# Patient Record
Sex: FEMALE | Race: WHITE | NOT HISPANIC OR LATINO | Employment: OTHER | URBAN - NONMETROPOLITAN AREA
[De-identification: names, ages, dates, MRNs, and addresses within clinical notes are randomized per-mention and may not be internally consistent; named-entity substitution may affect disease eponyms.]

---

## 2017-01-04 DIAGNOSIS — I10 ESSENTIAL HYPERTENSION: ICD-10-CM

## 2017-01-04 DIAGNOSIS — J43.8 OTHER EMPHYSEMA (HCC): ICD-10-CM

## 2017-01-04 DIAGNOSIS — E03.8 OTHER SPECIFIED HYPOTHYROIDISM: ICD-10-CM

## 2017-01-26 ENCOUNTER — OFFICE VISIT (OUTPATIENT)
Dept: CARDIOLOGY | Facility: CLINIC | Age: 62
End: 2017-01-26

## 2017-01-26 VITALS
HEIGHT: 62 IN | SYSTOLIC BLOOD PRESSURE: 140 MMHG | OXYGEN SATURATION: 96 % | WEIGHT: 166.6 LBS | BODY MASS INDEX: 30.66 KG/M2 | HEART RATE: 70 BPM | DIASTOLIC BLOOD PRESSURE: 84 MMHG

## 2017-01-26 DIAGNOSIS — G89.4 CHRONIC PAIN SYNDROME: ICD-10-CM

## 2017-01-26 DIAGNOSIS — J44.1 CHRONIC OBSTRUCTIVE PULMONARY DISEASE WITH ACUTE EXACERBATION (HCC): ICD-10-CM

## 2017-01-26 DIAGNOSIS — E53.8 B12 DEFICIENCY: ICD-10-CM

## 2017-01-26 DIAGNOSIS — I10 ESSENTIAL HYPERTENSION: ICD-10-CM

## 2017-01-26 DIAGNOSIS — E03.8 OTHER SPECIFIED HYPOTHYROIDISM: ICD-10-CM

## 2017-01-26 DIAGNOSIS — E55.9 VITAMIN D DEFICIENCY: ICD-10-CM

## 2017-01-26 DIAGNOSIS — J43.8 OTHER EMPHYSEMA (HCC): ICD-10-CM

## 2017-01-26 DIAGNOSIS — E11.9 TYPE 2 DIABETES MELLITUS WITHOUT COMPLICATION, WITHOUT LONG-TERM CURRENT USE OF INSULIN (HCC): ICD-10-CM

## 2017-01-26 DIAGNOSIS — E78.2 MIXED HYPERLIPIDEMIA: Primary | ICD-10-CM

## 2017-01-26 DIAGNOSIS — J06.9 UPPER RESPIRATORY TRACT INFECTION, UNSPECIFIED TYPE: ICD-10-CM

## 2017-01-26 DIAGNOSIS — J20.9 ACUTE BRONCHITIS, UNSPECIFIED ORGANISM: ICD-10-CM

## 2017-01-26 PROCEDURE — 96372 THER/PROPH/DIAG INJ SC/IM: CPT | Performed by: INTERNAL MEDICINE

## 2017-01-26 PROCEDURE — 99214 OFFICE O/P EST MOD 30 MIN: CPT | Performed by: INTERNAL MEDICINE

## 2017-01-26 RX ORDER — HYDROCODONE BITARTRATE AND ACETAMINOPHEN 5; 325 MG/1; MG/1
1 TABLET ORAL EVERY 8 HOURS PRN
Qty: 90 TABLET | Refills: 0 | Status: SHIPPED | OUTPATIENT
Start: 2017-01-26 | End: 2017-04-13 | Stop reason: SDUPTHER

## 2017-01-26 RX ORDER — AMOXICILLIN 500 MG/1
500 CAPSULE ORAL 3 TIMES DAILY
Qty: 30 CAPSULE | Refills: 0 | Status: SHIPPED | OUTPATIENT
Start: 2017-01-26 | End: 2017-04-13

## 2017-01-26 RX ORDER — TRIAMCINOLONE ACETONIDE 40 MG/ML
40 INJECTION, SUSPENSION INTRA-ARTICULAR; INTRAMUSCULAR ONCE
Status: COMPLETED | OUTPATIENT
Start: 2017-01-26 | End: 2017-01-26

## 2017-01-26 RX ORDER — CEFTRIAXONE 500 MG/1
500 INJECTION, POWDER, FOR SOLUTION INTRAMUSCULAR; INTRAVENOUS ONCE
Status: COMPLETED | OUTPATIENT
Start: 2017-01-26 | End: 2017-01-26

## 2017-01-26 RX ADMIN — CEFTRIAXONE 500 MG: 500 INJECTION, POWDER, FOR SOLUTION INTRAMUSCULAR; INTRAVENOUS at 10:12

## 2017-01-26 RX ADMIN — TRIAMCINOLONE ACETONIDE 40 MG: 40 INJECTION, SUSPENSION INTRA-ARTICULAR; INTRAMUSCULAR at 10:14

## 2017-01-26 NOTE — PROGRESS NOTES
subjective     Chief Complaint   Patient presents with   • Cough   • URI     History of Present Illness  Upper respiratory tract infection.  Patient states that for last 4 days she has been having significant sneezing runny nose and sore throat and coughing with clear expectoration.  He is getting worse.  Patient denies any fever or chills.  She is taking Singulair for COPD and Combivent respimat.    HYPERTENSION  Leonarda Diaz has long-standing essential hypertension for years. she is taking medications regularly. There are no medication side effects. Blood pressure is very well controlled. There has been no headache nausea chest pain. There has been no syncopal or presyncopal episode. she denies episodes of hypo-tension or accelerated hypertension.    Diabetes Mellitus   Leonarda Diaz has type 2 diabetes mellitus. Taking oral hypoglycemic agents. Tolerating medications very well. she sugar is fluctuating. she is trying to diet and lose weight and exercise. There are no diabetic complications.    Hyperlipidemia  Leonarda Diaz has long-standing history of hyperlipidemia. Has been trying to lose weight following diet and exercise. Patient is not taking Lipitor.  Patient states that she cannot tolerate it she tried even the lowest possible dose and to quit even down to 1 or 2 pills a week but she gets severe aching all over and cannot tolerate the medication she has not taken any in at least a month.    Hypothyroidism  Leonarda Diaz has long-standing history of hypothyroidism.she is taking Synthroid. Doing very well. No drug side effects. No change in Weight. No cold intolerance. denies any constipation. No hair loss. No dry skin.     Patient is hurting quite a bit. She is taking Plaquenil 200 mg daily and the patient required to take hydrocodone 5/325 up to 3 a day.       Patient Active Problem List   Diagnosis   • Bronchitis, acute   • B12 deficiency*   • Chronic pain syndrome*   • Hypertension*   • Hyperlipidemia*   •  Diabetes mellitus*   • Hypothyroidism*   • Vitamin D deficiency*       Social History   Substance Use Topics   • Smoking status: Current Every Day Smoker     Years: 10.00     Types: Electronic Cigarette   • Smokeless tobacco: Never Used   • Alcohol use No       Allergies   Allergen Reactions   • Avelox [Moxifloxacin]          Current Outpatient Prescriptions:   •  atorvastatin (LIPITOR) 10 MG tablet, Take 1 tablet by mouth daily., Disp: 90 tablet, Rfl: 3  •  furosemide (LASIX) 20 MG tablet, Take 1 tablet by mouth 2 (two) times a day., Disp: 90 tablet, Rfl: 1  •  glucose blood test strip, 1 each by Other route Daily. Use as instructed, Disp: , Rfl:   •  HYDROcodone-acetaminophen (NORCO) 5-325 MG per tablet, Take 1 tablet by mouth Every 8 (Eight) Hours As Needed for moderate pain (4-6)., Disp: 90 tablet, Rfl: 0  •  hydroxychloroquine (PLAQUENIL) 200 MG tablet, Take 200 mg by mouth daily., Disp: , Rfl:   •  ipratropium-albuterol (COMBIVENT RESPIMAT)  MCG/ACT inhaler, Inhale 1 puff 4 (four) times a day as needed for wheezing., Disp: 1 g, Rfl: 1  •  levothyroxine (SYNTHROID, LEVOTHROID) 75 MCG tablet, Take 1 tablet by mouth daily., Disp: 90 tablet, Rfl: 1  •  linagliptin (TRADJENTA) 5 MG tablet tablet, Take 1 tablet by mouth Daily., Disp: 30 tablet, Rfl: 2  •  metFORMIN (GLUCOPHAGE) 1000 MG tablet, Take 1 tablet by mouth 2 (Two) Times a Day With Meals., Disp: 180 tablet, Rfl: 0  •  metoprolol tartrate (LOPRESSOR) 25 MG tablet, Take 1 tablet by mouth 2 (two) times a day., Disp: 180 tablet, Rfl: 1  •  montelukast (SINGULAIR) 10 MG tablet, Take 1 tablet by mouth Every Night., Disp: 30 tablet, Rfl: 2  •  ONE TOUCH LANCETS misc, USES TWICE A DAY TO CHECK BLOOD SUGAR, Disp: 180 each, Rfl: 1  •  vitamin B-12 (CYANOCOBALAMIN) 500 MCG tablet, Take 1,000 mcg by mouth daily., Disp: , Rfl:   •  amoxicillin (AMOXIL) 500 MG capsule, Take 1 capsule by mouth 3 (Three) Times a Day., Disp: 30 capsule, Rfl: 0    Current  "Facility-Administered Medications:   •  cefTRIAXone (ROCEPHIN) injection 500 mg, 500 mg, Intramuscular, Once, Evita Rosario MD  •  triamcinolone acetonide (KENALOG) injection 40 mg, 40 mg, Intramuscular, Once, Evita Rosario MD      The following portions of the patient's history were reviewed and updated as appropriate: allergies, current medications, past family history, past medical history, past social history, past surgical history and problem list.    Review of Systems   Constitution: Positive for weakness and malaise/fatigue.   HENT: Positive for congestion, hoarse voice and sore throat.    Eyes: Negative.    Cardiovascular: Negative.    Respiratory: Positive for cough, sputum production and wheezing. Negative for hemoptysis and shortness of breath.    Hematologic/Lymphatic: Negative.    Musculoskeletal: Positive for arthritis, back pain and joint pain.   Gastrointestinal: Negative.    Psychiatric/Behavioral: The patient is nervous/anxious.           Objective:     Visit Vitals   • /84 (BP Location: Left arm, Patient Position: Sitting)   • Pulse 70   • Ht 62\" (157.5 cm)   • Wt 166 lb 9.6 oz (75.6 kg)   • SpO2 96%   • BMI 30.47 kg/m2     Physical Exam   Constitutional: She appears well-developed and well-nourished.   HENT:   Head: Normocephalic and atraumatic.   Mouth/Throat: Oropharyngeal exudate present.   Postnasal drip noted   Eyes: Conjunctivae and EOM are normal. Pupils are equal, round, and reactive to light. No scleral icterus.   Neck: Normal range of motion. Neck supple. No JVD present. No tracheal deviation present. No thyromegaly present.   Cardiovascular: Normal rate, regular rhythm, normal heart sounds and intact distal pulses.  Exam reveals no friction rub.    No murmur heard.  Pulmonary/Chest: Effort normal. No respiratory distress. She has wheezes. She has no rales. She exhibits no tenderness.   Abdominal: Soft. Bowel sounds are normal. She exhibits no distension and no " mass. There is no tenderness. There is no rebound and no guarding.   Musculoskeletal: Normal range of motion. She exhibits no edema, tenderness or deformity.   Lymphadenopathy:     She has no cervical adenopathy.   Neurological: She is alert. She has normal reflexes. No cranial nerve deficit. She exhibits normal muscle tone. Coordination normal.   Skin: Skin is warm and dry.   Psychiatric: She has a normal mood and affect. Her behavior is normal. Judgment and thought content normal.         Lab Review  Lab Results   Component Value Date     10/18/2016    K 4.9 10/18/2016     10/18/2016    BUN 13 10/18/2016    CREATININE 0.82 10/18/2016    GLUCOSE 107 10/18/2016    CALCIUM 10.0 10/18/2016    ALT 12 10/18/2016    ALKPHOS 67 10/18/2016    LABIL2 1.3 (L) 10/18/2016     Lab Results   Component Value Date    CKTOTAL 36 10/18/2016     Lab Results   Component Value Date    WBC 8.47 10/18/2016    HGB 14.2 10/18/2016    HCT 43.6 10/18/2016     10/18/2016     No results found for: INR  No results found for: MG  Lab Results   Component Value Date    TSH 1.991 10/18/2016     No results found for: BNP  Lab Results   Component Value Date    CHLPL 184 04/28/2016     Lab Results   Component Value Date    CHOL 221 (H) 10/18/2016    TRIG 169 (H) 10/18/2016    HDL 54 (L) 10/18/2016    LDLCALC 133 (H) 10/18/2016    VLDL 33.8 10/18/2016    LDLHDL 2.47 10/18/2016         Procedures     I personally viewed and interpreted the patient's LAB data         Assessment:     1. Mixed hyperlipidemia    2. Essential hypertension    3. Other emphysema    4. Other specified hypothyroidism    5. Vitamin D deficiency*    6. B12 deficiency*    7. Type 2 diabetes mellitus without complication, without long-term current use of insulin    8. Chronic pain syndrome*    9. Upper respiratory tract infection, unspecified type    10. Chronic obstructive pulmonary disease with acute exacerbation    11. Acute bronchitis, unspecified organism           Plan:      Acute bronchitis  Patient was given Rocephin amoxicillin she will continue to take Singulair  COPD with acute exacerbation she was given Kenalog shot she will take Singulair and respimat  Hyperlipidemia has been traditionally very poorly controlled patient is having difficulty taking Crestor but she stated that she is taking regularly now and hopefully her lab work will be better next time.  Pain medication refills were also given.  Her pressure is controlled  Will check thyroid functions also  .        No Follow-up on file.

## 2017-01-26 NOTE — MR AVS SNAPSHOT
Leonarda Diaz   1/26/2017 10:00 AM   Office Visit    Dept Phone:  [phone]   Encounter #:  [id number]    Provider:  Evita Rosario MD   Department:  [facility] CARDIOLOGY                Your Full Care Plan              Today's Medication Changes          These changes are accurate as of: 1/26/17 10:14 AM.  If you have any questions, ask your nurse or doctor.               New Medication(s)Ordered:     amoxicillin 500 MG capsule   Commonly known as:  AMOXIL   Take 1 capsule by mouth 3 (Three) Times a Day.   Started by:  Evita Rosario MD         Stop taking medication(s)listed here:     cholestyramine 4 G packet   Commonly known as:  QUESTRAN   Stopped by:  Evita Rosario MD                Where to Get Your Medications      These medications were sent to [address] [phone] [facility] [phone] [address] [address]     Phone:  [phone]     amoxicillin 500 MG capsule         You can get these medications from any pharmacy     Bring a paper prescription for each of these medications     HYDROcodone-acetaminophen 5-325 MG per tablet                  Your Updated Medication List          This list is accurate as of: 1/26/17 10:14 AM.  Always use your most recent med list.                amoxicillin 500 MG capsule   Commonly known as:  AMOXIL   Take 1 capsule by mouth 3 (Three) Times a Day.       atorvastatin 10 MG tablet   Commonly known as:  LIPITOR   Take 1 tablet by mouth daily.       furosemide 20 MG tablet   Commonly known as:  LASIX   Take 1 tablet by mouth 2 (two) times a day.       glucose blood test strip       HYDROcodone-acetaminophen 5-325 MG per tablet   Commonly known as:  NORCO   Take 1 tablet by mouth Every 8 (Eight) Hours As Needed for moderate pain (4-6).       ipratropium-albuterol  MCG/ACT inhaler   Commonly known as:  COMBIVENT RESPIMAT   Inhale 1 puff 4  (four) times a day as needed for wheezing.       levothyroxine 75 MCG tablet   Commonly known as:  SYNTHROID, LEVOTHROID   Take 1 tablet by mouth daily.       linagliptin 5 MG tablet tablet   Commonly known as:  TRADJENTA   Take 1 tablet by mouth Daily.       metFORMIN 1000 MG tablet   Commonly known as:  GLUCOPHAGE   Take 1 tablet by mouth 2 (Two) Times a Day With Meals.       metoprolol tartrate 25 MG tablet   Commonly known as:  LOPRESSOR   Take 1 tablet by mouth 2 (two) times a day.       montelukast 10 MG tablet   Commonly known as:  SINGULAIR   Take 1 tablet by mouth Every Night.       ONE TOUCH LANCETS misc   USES TWICE A DAY TO CHECK BLOOD SUGAR       PLAQUENIL 200 MG tablet   Generic drug:  hydroxychloroquine       vitamin B-12 500 MCG tablet   Commonly known as:  CYANOCOBALAMIN               You Were Diagnosed With        Codes Comments    Mixed hyperlipidemia    -  Primary ICD-10-CM: E78.2  ICD-9-CM: 272.2     Essential hypertension     ICD-10-CM: I10  ICD-9-CM: 401.9     Other emphysema     ICD-10-CM: J43.8  ICD-9-CM: 492.8     Other specified hypothyroidism     ICD-10-CM: E03.8  ICD-9-CM: 244.8     Vitamin D deficiency     ICD-10-CM: E55.9  ICD-9-CM: 268.9     B12 deficiency     ICD-10-CM: E53.8  ICD-9-CM: 266.2     Type 2 diabetes mellitus without complication, without long-term current use of insulin     ICD-10-CM: E11.9  ICD-9-CM: 250.00     Chronic pain syndrome     ICD-10-CM: G89.4  ICD-9-CM: 338.4     Upper respiratory tract infection, unspecified type     ICD-10-CM: J06.9  ICD-9-CM: 465.9     Chronic obstructive pulmonary disease with acute exacerbation     ICD-10-CM: J44.1  ICD-9-CM: 491.21     Acute bronchitis, unspecified organism     ICD-10-CM: J20.9  ICD-9-CM: 466.0       Medications to be Given to You by a Medical Professional     Due       Frequency    1/26/2017 triamcinolone acetonide (KENALOG-40) injection 40 mg  Once    (none) cefTRIAXone (ROCEPHIN) injection 500 mg  Once     "  Instructions     None    Patient Instructions History      Upcoming Appointments     Visit Type Date Time Department    FOLLOW UP 1/26/2017 10:00 AM [facility] [place] [facility]OLOG[facility] [place]    LAB 4/12/2017  8:50 AM [facility] [place] [facility]IOLOGY [place]    FOLLOW UP 4/13/2017 10:30 AM [facility] [place] [facility]IOLOGY [place]      MyChart Signup     Our records indicate that you have declined [facility] sign[facility]. If you would like to sign up for [facility], please email [email or web address] or call [phone] to obtain an activation code.             Other Info from Your Visit           Your Appointments     Apr 12, 2017  8:50 AM EDT   Lab with [facility]WORK, CARD COR   [facility] CARDIOLOGY (--)    [address] [place] [address]   [phone]            Apr 13, 2017 10:30 AM EDT   Follow Up with Evita Rosario MD   [facility] CARDIOLOGY (--)    [address] [place] [address]   [phone]           Arrive 15 minutes prior to appointment.              Allergies     Avelox [Moxifloxacin]        Reason for Visit     Cough     URI           Vital Signs     Blood Pressure Pulse Height Weight Oxygen Saturation Body Mass Index    140/84 (BP Location: Left arm, Patient Position: Sitting) 70 62\" (157.5 cm) 166 lb 9.6 oz (75.6 kg) 96% 30.47 kg/m2    Smoking Status                   Current Every Day Smoker           Problems and Diagnoses Noted     Vitamin B12 deficiency    Acute bronchitis    Chronic pain syndrome    Diabetes    High cholesterol or triglycerides    High blood pressure    Underactive thyroid    Vitamin D deficiency    Other emphysema        Upper respiratory infection        Chronic obstructive pulmonary disease with acute exacerbation          Medications Administered     cefTRIAXone (ROCEPHIN) injection 500 mg                      "

## 2017-02-01 DIAGNOSIS — J43.8 OTHER EMPHYSEMA (HCC): ICD-10-CM

## 2017-02-01 DIAGNOSIS — I10 ESSENTIAL HYPERTENSION: ICD-10-CM

## 2017-02-01 DIAGNOSIS — E03.8 OTHER SPECIFIED HYPOTHYROIDISM: ICD-10-CM

## 2017-02-01 RX ORDER — LEVOTHYROXINE SODIUM 0.07 MG/1
75 TABLET ORAL DAILY
Qty: 90 TABLET | Refills: 1 | Status: SHIPPED | OUTPATIENT
Start: 2017-02-01 | End: 2017-07-06 | Stop reason: SDUPTHER

## 2017-04-06 DIAGNOSIS — J43.8 OTHER EMPHYSEMA (HCC): ICD-10-CM

## 2017-04-06 DIAGNOSIS — I10 ESSENTIAL HYPERTENSION: ICD-10-CM

## 2017-04-06 DIAGNOSIS — E03.8 OTHER SPECIFIED HYPOTHYROIDISM: ICD-10-CM

## 2017-04-12 ENCOUNTER — LAB (OUTPATIENT)
Dept: CARDIOLOGY | Facility: CLINIC | Age: 62
End: 2017-04-12

## 2017-04-12 DIAGNOSIS — E03.8 OTHER SPECIFIED HYPOTHYROIDISM: ICD-10-CM

## 2017-04-12 DIAGNOSIS — E53.8 B12 DEFICIENCY: ICD-10-CM

## 2017-04-12 DIAGNOSIS — E78.2 MIXED HYPERLIPIDEMIA: ICD-10-CM

## 2017-04-12 DIAGNOSIS — E55.9 VITAMIN D DEFICIENCY: ICD-10-CM

## 2017-04-12 LAB
25(OH)D3 SERPL-MCNC: 34 NG/ML
ALBUMIN SERPL-MCNC: 4 G/DL (ref 3.4–4.8)
ALBUMIN/GLOB SERPL: 1.4 G/DL (ref 1.5–2.5)
ALP SERPL-CCNC: 71 U/L (ref 35–104)
ALT SERPL W P-5'-P-CCNC: 11 U/L (ref 10–36)
ANION GAP SERPL CALCULATED.3IONS-SCNC: 9.3 MMOL/L (ref 3.6–11.2)
AST SERPL-CCNC: 13 U/L (ref 10–30)
BASOPHILS # BLD AUTO: 0.05 10*3/MM3 (ref 0–0.3)
BASOPHILS NFR BLD AUTO: 0.5 % (ref 0–2)
BILIRUB SERPL-MCNC: 0.5 MG/DL (ref 0.2–1.8)
BUN BLD-MCNC: 9 MG/DL (ref 7–21)
BUN/CREAT SERPL: 13 (ref 7–25)
CALCIUM SPEC-SCNC: 9.1 MG/DL (ref 7.7–10)
CHLORIDE SERPL-SCNC: 108 MMOL/L (ref 99–112)
CHOLEST SERPL-MCNC: 146 MG/DL (ref 0–200)
CK SERPL-CCNC: 40 U/L (ref 24–173)
CO2 SERPL-SCNC: 24.7 MMOL/L (ref 24.3–31.9)
CREAT BLD-MCNC: 0.69 MG/DL (ref 0.43–1.29)
DEPRECATED RDW RBC AUTO: 45.7 FL (ref 37–54)
EOSINOPHIL # BLD AUTO: 0.33 10*3/MM3 (ref 0–0.7)
EOSINOPHIL NFR BLD AUTO: 3 % (ref 0–5)
ERYTHROCYTE [DISTWIDTH] IN BLOOD BY AUTOMATED COUNT: 13.7 % (ref 11.5–14.5)
GFR SERPL CREATININE-BSD FRML MDRD: 86 ML/MIN/1.73
GLOBULIN UR ELPH-MCNC: 2.9 GM/DL
GLUCOSE BLD-MCNC: 115 MG/DL (ref 70–110)
HCT VFR BLD AUTO: 41.5 % (ref 37–47)
HDLC SERPL-MCNC: 47 MG/DL (ref 60–100)
HGB BLD-MCNC: 13.3 G/DL (ref 12–16)
IMM GRANULOCYTES # BLD: 0.09 10*3/MM3 (ref 0–0.03)
IMM GRANULOCYTES NFR BLD: 0.8 % (ref 0–0.5)
LDLC SERPL CALC-MCNC: 76 MG/DL (ref 0–100)
LDLC/HDLC SERPL: 1.62 {RATIO}
LYMPHOCYTES # BLD AUTO: 1.76 10*3/MM3 (ref 1–3)
LYMPHOCYTES NFR BLD AUTO: 16.1 % (ref 21–51)
MCH RBC QN AUTO: 30.6 PG (ref 27–33)
MCHC RBC AUTO-ENTMCNC: 32 G/DL (ref 33–37)
MCV RBC AUTO: 95.4 FL (ref 80–94)
MONOCYTES # BLD AUTO: 0.78 10*3/MM3 (ref 0.1–0.9)
MONOCYTES NFR BLD AUTO: 7.1 % (ref 0–10)
NEUTROPHILS # BLD AUTO: 7.93 10*3/MM3 (ref 1.4–6.5)
NEUTROPHILS NFR BLD AUTO: 72.5 % (ref 30–70)
OSMOLALITY SERPL CALC.SUM OF ELEC: 282.7 MOSM/KG (ref 273–305)
PLATELET # BLD AUTO: 286 10*3/MM3 (ref 130–400)
PMV BLD AUTO: 11.3 FL (ref 6–10)
POTASSIUM BLD-SCNC: 4.3 MMOL/L (ref 3.5–5.3)
PROT SERPL-MCNC: 6.9 G/DL (ref 6–8)
RBC # BLD AUTO: 4.35 10*6/MM3 (ref 4.2–5.4)
SODIUM BLD-SCNC: 142 MMOL/L (ref 135–153)
T4 FREE SERPL-MCNC: 1.46 NG/DL (ref 0.89–1.76)
TRIGL SERPL-MCNC: 114 MG/DL (ref 0–150)
TSH SERPL DL<=0.05 MIU/L-ACNC: 2.88 MIU/ML (ref 0.55–4.78)
VIT B12 BLD-MCNC: 191 PG/ML (ref 211–911)
VLDLC SERPL-MCNC: 22.8 MG/DL
WBC NRBC COR # BLD: 10.94 10*3/MM3 (ref 4.5–12.5)

## 2017-04-12 PROCEDURE — 80061 LIPID PANEL: CPT | Performed by: INTERNAL MEDICINE

## 2017-04-12 PROCEDURE — 84443 ASSAY THYROID STIM HORMONE: CPT | Performed by: INTERNAL MEDICINE

## 2017-04-12 PROCEDURE — 84439 ASSAY OF FREE THYROXINE: CPT | Performed by: INTERNAL MEDICINE

## 2017-04-12 PROCEDURE — 85025 COMPLETE CBC W/AUTO DIFF WBC: CPT | Performed by: INTERNAL MEDICINE

## 2017-04-12 PROCEDURE — 82550 ASSAY OF CK (CPK): CPT | Performed by: INTERNAL MEDICINE

## 2017-04-12 PROCEDURE — 82306 VITAMIN D 25 HYDROXY: CPT | Performed by: INTERNAL MEDICINE

## 2017-04-12 PROCEDURE — 82607 VITAMIN B-12: CPT | Performed by: INTERNAL MEDICINE

## 2017-04-12 PROCEDURE — 80053 COMPREHEN METABOLIC PANEL: CPT | Performed by: INTERNAL MEDICINE

## 2017-04-13 ENCOUNTER — OFFICE VISIT (OUTPATIENT)
Dept: CARDIOLOGY | Facility: CLINIC | Age: 62
End: 2017-04-13

## 2017-04-13 VITALS
SYSTOLIC BLOOD PRESSURE: 122 MMHG | BODY MASS INDEX: 30.22 KG/M2 | WEIGHT: 164.2 LBS | DIASTOLIC BLOOD PRESSURE: 62 MMHG | OXYGEN SATURATION: 97 % | HEIGHT: 62 IN | HEART RATE: 68 BPM

## 2017-04-13 DIAGNOSIS — E03.8 OTHER SPECIFIED HYPOTHYROIDISM: ICD-10-CM

## 2017-04-13 DIAGNOSIS — E55.9 VITAMIN D DEFICIENCY: ICD-10-CM

## 2017-04-13 DIAGNOSIS — E11.10 UNCONTROLLED TYPE 2 DIABETES MELLITUS WITH KETOACIDOSIS WITHOUT COMA, WITH LONG-TERM CURRENT USE OF INSULIN (HCC): ICD-10-CM

## 2017-04-13 DIAGNOSIS — J43.8 OTHER EMPHYSEMA (HCC): ICD-10-CM

## 2017-04-13 DIAGNOSIS — E11.10 UNCONTROLLED TYPE 2 DIABETES MELLITUS WITH KETOACIDOSIS WITHOUT COMA, WITHOUT LONG-TERM CURRENT USE OF INSULIN (HCC): ICD-10-CM

## 2017-04-13 DIAGNOSIS — G89.4 CHRONIC PAIN SYNDROME: ICD-10-CM

## 2017-04-13 DIAGNOSIS — E11.9 TYPE 2 DIABETES MELLITUS WITHOUT COMPLICATION, WITHOUT LONG-TERM CURRENT USE OF INSULIN (HCC): ICD-10-CM

## 2017-04-13 DIAGNOSIS — E53.8 B12 DEFICIENCY: ICD-10-CM

## 2017-04-13 DIAGNOSIS — I10 ESSENTIAL HYPERTENSION: ICD-10-CM

## 2017-04-13 DIAGNOSIS — E78.2 MIXED HYPERLIPIDEMIA: Primary | ICD-10-CM

## 2017-04-13 DIAGNOSIS — Z79.4 UNCONTROLLED TYPE 2 DIABETES MELLITUS WITH KETOACIDOSIS WITHOUT COMA, WITH LONG-TERM CURRENT USE OF INSULIN (HCC): ICD-10-CM

## 2017-04-13 PROCEDURE — 99214 OFFICE O/P EST MOD 30 MIN: CPT | Performed by: INTERNAL MEDICINE

## 2017-04-13 RX ORDER — MONTELUKAST SODIUM 10 MG/1
10 TABLET ORAL NIGHTLY
Qty: 30 TABLET | Refills: 2 | Status: SHIPPED | OUTPATIENT
Start: 2017-04-13 | End: 2017-07-06 | Stop reason: SDUPTHER

## 2017-04-13 RX ORDER — HYDROCODONE BITARTRATE AND ACETAMINOPHEN 5; 325 MG/1; MG/1
1 TABLET ORAL EVERY 8 HOURS PRN
Qty: 90 TABLET | Refills: 0 | Status: SHIPPED | OUTPATIENT
Start: 2017-04-13 | End: 2017-07-06 | Stop reason: SDUPTHER

## 2017-04-13 NOTE — PROGRESS NOTES
subjective     Chief Complaint   Patient presents with   • Hypertension   • Hyperlipidemia   • Vitamin D Deficiency   • Diabetes     History of Present Illness  HYPERTENSION  Leonarda Diaz has long-standing essential hypertension for years. she is taking medications regularly. There are no medication side effects. Blood pressure is very well controlled. There has been no headache nausea chest pain. There has been no syncopal or presyncopal episode. she denies episodes of hypo-tension or accelerated hypertension.     Diabetes Mellitus   Leonarda Diaz has type 2 diabetes mellitus. Taking oral hypoglycemic agents. Tolerating medications very well. she sugar is fluctuating. she is trying to diet and lose weight and exercise. There are no diabetic complications.     Hyperlipidemia  Leonarda Diaz has long-standing history of hyperlipidemia. Has been trying to lose weight following diet and exercise. Patient is not taking Lipitor.  Patient states that she cannot tolerate it she tried even the lowest possible dose and to quit even down to 1 or 2 pills a week but she gets severe aching all over and cannot tolerate the medication she has not taken any in at least a month.     Hypothyroidism  Leonarda Diaz has long-standing history of hypothyroidism.she is taking Synthroid. Doing very well. No drug side effects. No change in Weight. No cold intolerance. denies any constipation. No hair loss. No dry skin.      Patient is hurting quite a bit. She is taking Plaquenil 200 mg daily and the patient required to take hydrocodone 5/325 up to 3 a day.           Patient Active Problem List   Diagnosis   • B12 deficiency*   • Chronic pain syndrome*   • Hypertension*   • Hyperlipidemia*   • Diabetes mellitus*   • Hypothyroidism*   • Vitamin D deficiency*       Social History   Substance Use Topics   • Smoking status: Current Every Day Smoker     Years: 10.00     Types: Electronic Cigarette   • Smokeless tobacco: Never Used   • Alcohol use No        Allergies   Allergen Reactions   • Avelox [Moxifloxacin]          Current Outpatient Prescriptions:   •  atorvastatin (LIPITOR) 10 MG tablet, Take 1 tablet by mouth daily., Disp: 90 tablet, Rfl: 3  •  furosemide (LASIX) 20 MG tablet, Take 1 tablet by mouth 2 (two) times a day., Disp: 90 tablet, Rfl: 1  •  glucose blood test strip, 1 each by Other route Daily. Use as instructed, Disp: , Rfl:   •  HYDROcodone-acetaminophen (NORCO) 5-325 MG per tablet, Take 1 tablet by mouth Every 8 (Eight) Hours As Needed for moderate pain (4-6)., Disp: 90 tablet, Rfl: 0  •  hydroxychloroquine (PLAQUENIL) 200 MG tablet, Take 200 mg by mouth daily., Disp: , Rfl:   •  ipratropium-albuterol (COMBIVENT RESPIMAT)  MCG/ACT inhaler, Inhale 1 puff 4 (four) times a day as needed for wheezing., Disp: 1 g, Rfl: 1  •  levothyroxine (SYNTHROID, LEVOTHROID) 75 MCG tablet, Take 1 tablet by mouth Daily., Disp: 90 tablet, Rfl: 1  •  linagliptin (TRADJENTA) 5 MG tablet tablet, Take 1 tablet by mouth Daily., Disp: 30 tablet, Rfl: 2  •  metFORMIN (GLUCOPHAGE) 1000 MG tablet, Take 1 tablet by mouth 2 (Two) Times a Day With Meals., Disp: 180 tablet, Rfl: 0  •  metoprolol tartrate (LOPRESSOR) 25 MG tablet, Take 1 tablet by mouth 2 (Two) Times a Day., Disp: 180 tablet, Rfl: 1  •  montelukast (SINGULAIR) 10 MG tablet, Take 1 tablet by mouth Every Night., Disp: 30 tablet, Rfl: 2  •  ONE TOUCH LANCETS misc, USES TWICE A DAY TO CHECK BLOOD SUGAR, Disp: 180 each, Rfl: 1  •  vitamin B-12 (CYANOCOBALAMIN) 500 MCG tablet, Take 1,000 mcg by mouth daily., Disp: , Rfl:       The following portions of the patient's history were reviewed and updated as appropriate: allergies, current medications, past family history, past medical history, past social history, past surgical history and problem list.    Review of Systems   Constitution: Negative.   HENT: Negative.    Eyes: Negative.    Cardiovascular: Positive for leg swelling.   Respiratory: Positive for  "shortness of breath.    Hematologic/Lymphatic: Negative.    Musculoskeletal: Positive for arthritis, back pain, myalgias and stiffness.   Gastrointestinal: Negative.    Neurological: Negative.           Objective:     /62 (BP Location: Left arm, Patient Position: Sitting)  Pulse 68  Ht 62\" (157.5 cm)  Wt 164 lb 3.2 oz (74.5 kg)  SpO2 97%  BMI 30.03 kg/m2  Physical Exam   Constitutional: She appears well-developed and well-nourished.   HENT:   Head: Normocephalic and atraumatic.   Mouth/Throat: Oropharynx is clear and moist.   Eyes: Conjunctivae and EOM are normal. Pupils are equal, round, and reactive to light. No scleral icterus.   Neck: Normal range of motion. Neck supple. No JVD present. No tracheal deviation present. No thyromegaly present.   Cardiovascular: Normal rate, regular rhythm, normal heart sounds and intact distal pulses.  Exam reveals no friction rub.    No murmur heard.  Pulmonary/Chest: Effort normal and breath sounds normal. No respiratory distress. She has no wheezes. She has no rales. She exhibits no tenderness.   Abdominal: Soft. Bowel sounds are normal. She exhibits no distension and no mass. There is no tenderness. There is no rebound and no guarding.   Musculoskeletal: Normal range of motion. She exhibits no edema, tenderness or deformity.   Lymphadenopathy:     She has no cervical adenopathy.   Neurological: She is alert. She has normal reflexes. No cranial nerve deficit. She exhibits normal muscle tone. Coordination normal.   Skin: Skin is warm and dry.   Psychiatric: She has a normal mood and affect. Her behavior is normal. Judgment and thought content normal.         Lab Review  Lab Results   Component Value Date     04/12/2017    K 4.3 04/12/2017     04/12/2017    BUN 9 04/12/2017    CREATININE 0.69 04/12/2017    GLUCOSE 115 (H) 04/12/2017    CALCIUM 9.1 04/12/2017    ALT 11 04/12/2017    ALKPHOS 71 04/12/2017    LABIL2 1.4 (L) 04/12/2017     Lab Results "   Component Value Date    CKTOTAL 40 04/12/2017     Lab Results   Component Value Date    WBC 10.94 04/12/2017    HGB 13.3 04/12/2017    HCT 41.5 04/12/2017     04/12/2017     No results found for: INR  No results found for: MG  Lab Results   Component Value Date    TSH 2.876 04/12/2017     No results found for: BNP  Lab Results   Component Value Date    CHLPL 184 04/28/2016     Lab Results   Component Value Date    CHOL 146 04/12/2017    TRIG 114 04/12/2017    HDL 47 (L) 04/12/2017    LDLCALC 76 04/12/2017    VLDL 22.8 04/12/2017    LDLHDL 1.62 04/12/2017         Procedures     I personally viewed and interpreted the patient's LAB data         Assessment:     1. Mixed hyperlipidemia    2. Essential hypertension    3. Other emphysema    4. Other specified hypothyroidism    5. Uncontrolled type 2 diabetes mellitus with ketoacidosis without coma, with long-term current use of insulin    6. Uncontrolled type 2 diabetes mellitus with ketoacidosis without coma, without long-term current use of insulin    7. B12 deficiency*    8. Vitamin D deficiency*    9. Type 2 diabetes mellitus without complication, without long-term current use of insulin    10. Chronic pain syndrome*          Plan:      Lipid control is very well with total cholesterol of 146 and LDL of 76.  Further risk modification was urged.  Patient will continue Lipitor 10 mg daily.    Diabetes control is also good she is taking Glucophage 1000 twice a day Tradjenta 5 mg daily.  She is taking her sugar daily and is fairly well controlled.    Blood pressure is borderline elevated no change in therapy was made    Thyroid functions and normal Synthroid dose was continued.    Weight loss and aggressive risk factor modification was again stressed.  She will continue vitamin D and vitamin B.    chronic pain Norco was prescribed also.  Follow-up scheduled        No Follow-up on file.

## 2017-07-06 ENCOUNTER — OFFICE VISIT (OUTPATIENT)
Dept: CARDIOLOGY | Facility: CLINIC | Age: 62
End: 2017-07-06

## 2017-07-06 VITALS
BODY MASS INDEX: 29.63 KG/M2 | OXYGEN SATURATION: 94 % | HEIGHT: 62 IN | HEART RATE: 73 BPM | WEIGHT: 161 LBS | SYSTOLIC BLOOD PRESSURE: 130 MMHG | DIASTOLIC BLOOD PRESSURE: 80 MMHG

## 2017-07-06 DIAGNOSIS — E78.2 MIXED HYPERLIPIDEMIA: Primary | ICD-10-CM

## 2017-07-06 DIAGNOSIS — E11.9 TYPE 2 DIABETES MELLITUS WITHOUT COMPLICATION, WITHOUT LONG-TERM CURRENT USE OF INSULIN (HCC): ICD-10-CM

## 2017-07-06 DIAGNOSIS — G89.4 CHRONIC PAIN SYNDROME: ICD-10-CM

## 2017-07-06 DIAGNOSIS — E55.9 VITAMIN D DEFICIENCY: ICD-10-CM

## 2017-07-06 DIAGNOSIS — E53.8 B12 DEFICIENCY: ICD-10-CM

## 2017-07-06 DIAGNOSIS — Z13.9 SCREENING: ICD-10-CM

## 2017-07-06 DIAGNOSIS — I10 ESSENTIAL HYPERTENSION: ICD-10-CM

## 2017-07-06 DIAGNOSIS — E03.8 OTHER SPECIFIED HYPOTHYROIDISM: ICD-10-CM

## 2017-07-06 DIAGNOSIS — J43.8 OTHER EMPHYSEMA (HCC): ICD-10-CM

## 2017-07-06 PROCEDURE — 99214 OFFICE O/P EST MOD 30 MIN: CPT | Performed by: INTERNAL MEDICINE

## 2017-07-06 RX ORDER — HYDROCODONE BITARTRATE AND ACETAMINOPHEN 5; 325 MG/1; MG/1
1 TABLET ORAL EVERY 8 HOURS PRN
Qty: 90 TABLET | Refills: 0 | Status: SHIPPED | OUTPATIENT
Start: 2017-07-06 | End: 2017-10-02 | Stop reason: SDUPTHER

## 2017-07-06 RX ORDER — ATORVASTATIN CALCIUM 10 MG/1
10 TABLET, FILM COATED ORAL DAILY
Qty: 90 TABLET | Refills: 3 | Status: SHIPPED | OUTPATIENT
Start: 2017-07-06 | End: 2018-03-20 | Stop reason: SDUPTHER

## 2017-07-06 RX ORDER — LEVOTHYROXINE SODIUM 0.07 MG/1
75 TABLET ORAL DAILY
Qty: 90 TABLET | Refills: 1 | Status: SHIPPED | OUTPATIENT
Start: 2017-07-06 | End: 2017-10-02

## 2017-07-06 RX ORDER — MONTELUKAST SODIUM 10 MG/1
10 TABLET ORAL NIGHTLY
Qty: 30 TABLET | Refills: 2 | Status: SHIPPED | OUTPATIENT
Start: 2017-07-06 | End: 2017-10-02 | Stop reason: SDUPTHER

## 2017-07-06 NOTE — PROGRESS NOTES
subjective     Chief Complaint   Patient presents with   • Hypertension   • Hyperlipidemia   • Diabetes     History of Present Illness    HYPERTENSION  Leonarda Diaz has long-standing essential hypertension for years. she is taking medications regularly. There are no medication side effects. Blood pressure is very well controlled. There has been no headache nausea chest pain. There has been no syncopal or presyncopal episode. she denies episodes of hypo-tension or accelerated hypertension.      Diabetes Mellitus   Leonarda Diaz has type 2 diabetes mellitus. Taking oral hypoglycemic agents. Tolerating medications very well. she sugar is fluctuating. she is trying to diet and lose weight and exercise. There are no diabetic complications.      Hyperlipidemia  Leonarda Diaz has long-standing history of hyperlipidemia. Has been trying to lose weight following diet and exercise. Patient is not taking Lipitor.  Patient states that she cannot tolerate it she tried even the lowest possible dose and to quit even down to 1 or 2 pills a week but she gets severe aching all over and cannot tolerate the medication she has not taken any in at least a month.      Hypothyroidism  Leonarda Diaz has long-standing history of hypothyroidism.she is taking Synthroid. Doing very well. No drug side effects. No change in Weight. No cold intolerance. denies any constipation. No hair loss. No dry skin.      Patient is hurting quite a bit. She is taking Plaquenil 200 mg daily and the patient required to take hydrocodone 5/325 up to 3 a day.          Past Surgical History:   Procedure Laterality Date   • CHOLECYSTECTOMY     • COLONOSCOPY W/ BIOPSIES  2012   • HYSTERECTOMY     • VARICOSE VEIN SURGERY Right      Family History   Problem Relation Age of Onset   • Thyroid cancer Mother    • Aneurysm Mother    • Hypertension Mother    • Diabetes Mother    • Arthritis Mother    • Heart attack Father    • Heart disease Father    • Diabetes Brother    • Kidney  failure Brother    • Hypertension Brother    • Diabetes Brother    • Hypertension Brother      Past Medical History:   Diagnosis Date   • B12 deficiency    • Bronchitis, acute    • Chronic pain syndrome    • COPD (chronic obstructive pulmonary disease)    • Diabetes mellitus    • Disease of thyroid gland    • Hyperlipidemia    • Hypertension      Patient Active Problem List   Diagnosis   • B12 deficiency*   • Chronic pain syndrome*   • Hypertension*   • Hyperlipidemia*   • Diabetes mellitus*   • Hypothyroidism*   • Vitamin D deficiency*       Social History   Substance Use Topics   • Smoking status: Current Every Day Smoker     Years: 10.00     Types: Electronic Cigarette   • Smokeless tobacco: Never Used   • Alcohol use No       Allergies   Allergen Reactions   • Avelox [Moxifloxacin]        Current Outpatient Prescriptions on File Prior to Visit   Medication Sig   • furosemide (LASIX) 20 MG tablet Take 1 tablet by mouth 2 (two) times a day.   • hydroxychloroquine (PLAQUENIL) 200 MG tablet Take 200 mg by mouth daily.   • vitamin B-12 (CYANOCOBALAMIN) 500 MCG tablet Take 1,000 mcg by mouth daily.   • [DISCONTINUED] atorvastatin (LIPITOR) 10 MG tablet Take 1 tablet by mouth daily.   • [DISCONTINUED] glucose blood test strip 1 each by Other route Daily. Use as instructed   • [DISCONTINUED] HYDROcodone-acetaminophen (NORCO) 5-325 MG per tablet Take 1 tablet by mouth Every 8 (Eight) Hours As Needed for Moderate Pain (4-6).   • [DISCONTINUED] ipratropium-albuterol (COMBIVENT RESPIMAT)  MCG/ACT inhaler Inhale 1 puff 4 (Four) Times a Day As Needed for Wheezing.   • [DISCONTINUED] levothyroxine (SYNTHROID, LEVOTHROID) 75 MCG tablet Take 1 tablet by mouth Daily.   • [DISCONTINUED] linagliptin (TRADJENTA) 5 MG tablet tablet Take 1 tablet by mouth Daily.   • [DISCONTINUED] metFORMIN (GLUCOPHAGE) 1000 MG tablet Take 1 tablet by mouth 2 (Two) Times a Day With Meals.   • [DISCONTINUED] metoprolol tartrate (LOPRESSOR) 25 MG  "tablet Take 1 tablet by mouth 2 (Two) Times a Day.   • [DISCONTINUED] montelukast (SINGULAIR) 10 MG tablet Take 1 tablet by mouth Every Night.   • [DISCONTINUED] ONE TOUCH LANCETS [facility] [place] USES TWICE A DAY TO CHECK BLOOD SUGAR     No current facility-administered medications on file prior to visit.          The following portions of the patient's history were reviewed and updated as appropriate: allergies, current medications, past family history, past medical history, past social history, past surgical history and problem list.    Review of Systems   Constitution: Negative.   HENT: Negative.  Negative for congestion and headaches.    Eyes: Negative.    Cardiovascular: Negative.  Negative for chest pain, cyanosis, dyspnea on exertion, irregular heartbeat, leg swelling, near-syncope, orthopnea, palpitations, paroxysmal nocturnal dyspnea and syncope.   Respiratory: Negative.  Negative for shortness of breath.    Hematologic/Lymphatic: Negative.    Skin: Negative.    Musculoskeletal: Negative.    Gastrointestinal: Negative.    Genitourinary: Negative.    Neurological: Negative.    Psychiatric/Behavioral: Negative.           Objective:     /80 (BP Location: Left arm, Patient Position: Sitting, Cuff Size: Adult)  Pulse 73  Ht 62\" (157.5 cm)  Wt 161 lb (73 kg)  SpO2 94%  BMI 29.45 kg/m2  Physical Exam   Constitutional: She appears well-developed and well-nourished.   HENT:   Head: Normocephalic and atraumatic.   Mouth/Throat: Oropharynx is clear and moist.   Eyes: Conjunctivae and EOM are normal. Pupils are equal, round, and reactive to light. No scleral icterus.   Neck: Normal range of motion. Neck supple. No JVD present. No tracheal deviation present. No thyromegaly present.   Cardiovascular: Normal rate, regular rhythm, normal heart sounds and intact distal pulses.  Exam reveals no friction rub.    No murmur heard.  Pulmonary/Chest: Effort normal and breath sounds normal. No respiratory distress. She has no " wheezes. She has no rales. She exhibits no tenderness.   Abdominal: Soft. Bowel sounds are normal. She exhibits no distension and no mass. There is no tenderness. There is no rebound and no guarding.   Musculoskeletal: Normal range of motion. She exhibits no edema, tenderness or deformity.   Lymphadenopathy:     She has no cervical adenopathy.   Neurological: She is alert. She has normal reflexes. No cranial nerve deficit. She exhibits normal muscle tone. Coordination normal.   Skin: Skin is warm and dry.   Psychiatric: She has a normal mood and affect. Her behavior is normal. Judgment and thought content normal.         Lab Review  Lab Results   Component Value Date     04/12/2017    K 4.3 04/12/2017     04/12/2017    BUN 9 04/12/2017    CREATININE 0.69 04/12/2017    GLUCOSE 115 (H) 04/12/2017    CALCIUM 9.1 04/12/2017    ALT 11 04/12/2017    ALKPHOS 71 04/12/2017    LABIL2 1.4 (L) 04/12/2017     Lab Results   Component Value Date    CKTOTAL 40 04/12/2017     Lab Results   Component Value Date    WBC 10.94 04/12/2017    HGB 13.3 04/12/2017    HCT 41.5 04/12/2017     04/12/2017     No results found for: INR  No results found for: MG  Lab Results   Component Value Date    TSH 2.876 04/12/2017     No results found for: BNP  Lab Results   Component Value Date    CHOL 146 04/12/2017    CHLPL 184 04/28/2016    TRIG 114 04/12/2017    HDL 47 (L) 04/12/2017    LDLCALC 76 04/12/2017    VLDL 22.8 04/12/2017    LDLHDL 1.62 04/12/2017         Procedures       I personally viewed and interpreted the patient's LAB data         Assessment:     1. Mixed hyperlipidemia    2. Essential hypertension    3. Other emphysema    4. Other specified hypothyroidism    5. Type 2 diabetes mellitus without complication, without long-term current use of insulin    6. Chronic pain syndrome*    7. Screening    8. Vitamin D deficiency*    9. B12 deficiency*          Plan:       Lipids are fairly well controlled with current  medications.  She was advised to continue taking Lipitor 10 mg daily.  Healthy lifestyle was discussed.  Lab work will be checked next visit again.    Sugar is running very well.  Last lab work showed sugar of 115.  Patient was advised to continue current medications.  She will take Glucophage 1000 twice a day and Tradjenta 5 daily.  Lab work scheduled for next visit.    Thyroid functions are also normal.  Patient will take Synthroid 75 µg daily.  Refills of medications were given.    Lab work scheduled    Preventive health maintenance discussed.  Patient agrees to mammogram which was scheduled.     3 months  Follow-up          No Follow-up on file.

## 2017-08-01 ENCOUNTER — HOSPITAL ENCOUNTER (OUTPATIENT)
Dept: MAMMOGRAPHY | Facility: HOSPITAL | Age: 62
Discharge: HOME | End: 2017-08-01
Attending: INTERNAL MEDICINE | Admitting: INTERNAL MEDICINE

## 2017-08-01 DIAGNOSIS — Z13.9 SCREENING: ICD-10-CM

## 2017-08-01 PROCEDURE — 77063 BREAST TOMOSYNTHESIS BI: CPT | Performed by: RADIOLOGY

## 2017-08-01 PROCEDURE — G0202 SCR MAMMO BI INCL CAD: HCPCS

## 2017-08-01 PROCEDURE — 77063 BREAST TOMOSYNTHESIS BI: CPT

## 2017-08-01 PROCEDURE — 77067 SCR MAMMO BI INCL CAD: CPT | Performed by: RADIOLOGY

## 2017-09-08 ENCOUNTER — LAB (OUTPATIENT)
Dept: LAB | Facility: HOSPITAL | Age: 62
End: 2017-09-08

## 2017-09-08 DIAGNOSIS — E78.2 MIXED HYPERLIPIDEMIA: ICD-10-CM

## 2017-09-08 DIAGNOSIS — E11.9 TYPE 2 DIABETES MELLITUS WITHOUT COMPLICATION, WITHOUT LONG-TERM CURRENT USE OF INSULIN (HCC): ICD-10-CM

## 2017-09-08 DIAGNOSIS — E03.8 OTHER SPECIFIED HYPOTHYROIDISM: ICD-10-CM

## 2017-09-08 LAB
ALBUMIN SERPL-MCNC: 4.1 G/DL (ref 3.4–4.8)
ALBUMIN/GLOB SERPL: 1.5 G/DL (ref 1.5–2.5)
ALP SERPL-CCNC: 65 U/L (ref 35–104)
ALT SERPL W P-5'-P-CCNC: 13 U/L (ref 10–36)
ANION GAP SERPL CALCULATED.3IONS-SCNC: 3.5 MMOL/L (ref 3.6–11.2)
AST SERPL-CCNC: 18 U/L (ref 10–30)
BASOPHILS # BLD AUTO: 0.04 10*3/MM3 (ref 0–0.3)
BASOPHILS NFR BLD AUTO: 0.4 % (ref 0–2)
BILIRUB SERPL-MCNC: 0.5 MG/DL (ref 0.2–1.8)
BUN BLD-MCNC: 11 MG/DL (ref 7–21)
BUN/CREAT SERPL: 15.1 (ref 7–25)
CALCIUM SPEC-SCNC: 9.5 MG/DL (ref 7.7–10)
CHLORIDE SERPL-SCNC: 111 MMOL/L (ref 99–112)
CHOLEST SERPL-MCNC: 170 MG/DL (ref 0–200)
CK SERPL-CCNC: 176 U/L (ref 24–173)
CO2 SERPL-SCNC: 27.5 MMOL/L (ref 24.3–31.9)
CREAT BLD-MCNC: 0.73 MG/DL (ref 0.43–1.29)
DEPRECATED RDW RBC AUTO: 46.8 FL (ref 37–54)
EOSINOPHIL # BLD AUTO: 0.22 10*3/MM3 (ref 0–0.7)
EOSINOPHIL NFR BLD AUTO: 2.4 % (ref 0–5)
ERYTHROCYTE [DISTWIDTH] IN BLOOD BY AUTOMATED COUNT: 13.9 % (ref 11.5–14.5)
GFR SERPL CREATININE-BSD FRML MDRD: 81 ML/MIN/1.73
GLOBULIN UR ELPH-MCNC: 2.8 GM/DL
GLUCOSE BLD-MCNC: 123 MG/DL (ref 70–110)
HBA1C MFR BLD: 5.8 % (ref 4.5–5.7)
HCT VFR BLD AUTO: 39.8 % (ref 37–47)
HDLC SERPL-MCNC: 42 MG/DL (ref 60–100)
HGB BLD-MCNC: 13 G/DL (ref 12–16)
IMM GRANULOCYTES # BLD: 0.03 10*3/MM3 (ref 0–0.03)
IMM GRANULOCYTES NFR BLD: 0.3 % (ref 0–0.5)
LDLC SERPL CALC-MCNC: 100 MG/DL (ref 0–100)
LDLC/HDLC SERPL: 2.38 {RATIO}
LYMPHOCYTES # BLD AUTO: 2.15 10*3/MM3 (ref 1–3)
LYMPHOCYTES NFR BLD AUTO: 23.9 % (ref 21–51)
MCH RBC QN AUTO: 30.4 PG (ref 27–33)
MCHC RBC AUTO-ENTMCNC: 32.7 G/DL (ref 33–37)
MCV RBC AUTO: 93.2 FL (ref 80–94)
MONOCYTES # BLD AUTO: 0.77 10*3/MM3 (ref 0.1–0.9)
MONOCYTES NFR BLD AUTO: 8.6 % (ref 0–10)
NEUTROPHILS # BLD AUTO: 5.77 10*3/MM3 (ref 1.4–6.5)
NEUTROPHILS NFR BLD AUTO: 64.4 % (ref 30–70)
OSMOLALITY SERPL CALC.SUM OF ELEC: 283.9 MOSM/KG (ref 273–305)
PLATELET # BLD AUTO: 276 10*3/MM3 (ref 130–400)
PMV BLD AUTO: 11.1 FL (ref 6–10)
POTASSIUM BLD-SCNC: 4.2 MMOL/L (ref 3.5–5.3)
PROT SERPL-MCNC: 6.9 G/DL (ref 6–8)
RBC # BLD AUTO: 4.27 10*6/MM3 (ref 4.2–5.4)
SODIUM BLD-SCNC: 142 MMOL/L (ref 135–153)
T4 FREE SERPL-MCNC: 1.32 NG/DL (ref 0.89–1.76)
TRIGL SERPL-MCNC: 140 MG/DL (ref 0–150)
TSH SERPL DL<=0.05 MIU/L-ACNC: 4.96 MIU/ML (ref 0.55–4.78)
VLDLC SERPL-MCNC: 28 MG/DL
WBC NRBC COR # BLD: 8.98 10*3/MM3 (ref 4.5–12.5)

## 2017-09-08 PROCEDURE — 84439 ASSAY OF FREE THYROXINE: CPT | Performed by: INTERNAL MEDICINE

## 2017-09-08 PROCEDURE — 85025 COMPLETE CBC W/AUTO DIFF WBC: CPT | Performed by: INTERNAL MEDICINE

## 2017-09-08 PROCEDURE — 82550 ASSAY OF CK (CPK): CPT | Performed by: INTERNAL MEDICINE

## 2017-09-08 PROCEDURE — 84443 ASSAY THYROID STIM HORMONE: CPT | Performed by: INTERNAL MEDICINE

## 2017-09-08 PROCEDURE — 80061 LIPID PANEL: CPT | Performed by: INTERNAL MEDICINE

## 2017-09-08 PROCEDURE — 80053 COMPREHEN METABOLIC PANEL: CPT | Performed by: INTERNAL MEDICINE

## 2017-09-08 PROCEDURE — 83036 HEMOGLOBIN GLYCOSYLATED A1C: CPT | Performed by: INTERNAL MEDICINE

## 2017-10-02 ENCOUNTER — OFFICE VISIT (OUTPATIENT)
Dept: CARDIOLOGY | Facility: CLINIC | Age: 62
End: 2017-10-02

## 2017-10-02 VITALS
HEIGHT: 62 IN | BODY MASS INDEX: 29.88 KG/M2 | WEIGHT: 162.4 LBS | OXYGEN SATURATION: 98 % | DIASTOLIC BLOOD PRESSURE: 76 MMHG | HEART RATE: 71 BPM | SYSTOLIC BLOOD PRESSURE: 128 MMHG

## 2017-10-02 DIAGNOSIS — E03.8 OTHER SPECIFIED HYPOTHYROIDISM: ICD-10-CM

## 2017-10-02 DIAGNOSIS — J20.9 ACUTE BRONCHITIS, UNSPECIFIED ORGANISM: Primary | ICD-10-CM

## 2017-10-02 DIAGNOSIS — J06.9 UPPER RESPIRATORY TRACT INFECTION, UNSPECIFIED TYPE: ICD-10-CM

## 2017-10-02 DIAGNOSIS — I10 ESSENTIAL HYPERTENSION: ICD-10-CM

## 2017-10-02 DIAGNOSIS — E78.2 MIXED HYPERLIPIDEMIA: ICD-10-CM

## 2017-10-02 DIAGNOSIS — E11.9 TYPE 2 DIABETES MELLITUS WITHOUT COMPLICATION, WITHOUT LONG-TERM CURRENT USE OF INSULIN (HCC): ICD-10-CM

## 2017-10-02 DIAGNOSIS — J43.8 OTHER EMPHYSEMA (HCC): ICD-10-CM

## 2017-10-02 PROCEDURE — 99214 OFFICE O/P EST MOD 30 MIN: CPT | Performed by: INTERNAL MEDICINE

## 2017-10-02 PROCEDURE — 96372 THER/PROPH/DIAG INJ SC/IM: CPT | Performed by: INTERNAL MEDICINE

## 2017-10-02 RX ORDER — HYDROCODONE BITARTRATE AND ACETAMINOPHEN 5; 325 MG/1; MG/1
1 TABLET ORAL EVERY 8 HOURS PRN
Qty: 90 TABLET | Refills: 0 | Status: SHIPPED | OUTPATIENT
Start: 2017-10-02 | End: 2017-12-20 | Stop reason: SDUPTHER

## 2017-10-02 RX ORDER — LEVOTHYROXINE SODIUM 0.07 MG/1
75 TABLET ORAL DAILY
Qty: 90 TABLET | Refills: 1 | Status: CANCELLED | OUTPATIENT
Start: 2017-10-02

## 2017-10-02 RX ORDER — PREDNISONE 10 MG/1
10 TABLET ORAL DAILY
Qty: 20 TABLET | Refills: 0 | Status: SHIPPED | OUTPATIENT
Start: 2017-10-02 | End: 2017-12-20

## 2017-10-02 RX ORDER — MONTELUKAST SODIUM 10 MG/1
10 TABLET ORAL NIGHTLY
Qty: 30 TABLET | Refills: 2 | Status: SHIPPED | OUTPATIENT
Start: 2017-10-02 | End: 2018-03-20 | Stop reason: SDUPTHER

## 2017-10-02 RX ORDER — AMOXICILLIN AND CLAVULANATE POTASSIUM 875; 125 MG/1; MG/1
1 TABLET, FILM COATED ORAL 2 TIMES DAILY
Qty: 20 TABLET | Refills: 0 | Status: SHIPPED | OUTPATIENT
Start: 2017-10-02 | End: 2017-10-12

## 2017-10-02 RX ORDER — CEFTRIAXONE 500 MG/1
500 INJECTION, POWDER, FOR SOLUTION INTRAMUSCULAR; INTRAVENOUS EVERY 24 HOURS
Status: DISCONTINUED | OUTPATIENT
Start: 2017-10-02 | End: 2017-12-20

## 2017-10-02 RX ORDER — LEVOTHYROXINE SODIUM 0.1 MG/1
100 TABLET ORAL DAILY
Qty: 90 TABLET | Refills: 2 | Status: SHIPPED | OUTPATIENT
Start: 2017-10-02 | End: 2018-03-20 | Stop reason: SDUPTHER

## 2017-10-02 RX ORDER — HYDROCODONE BITARTRATE AND ACETAMINOPHEN 5; 325 MG/1; MG/1
1 TABLET ORAL EVERY 8 HOURS PRN
Qty: 90 TABLET | Refills: 0 | Status: SHIPPED | OUTPATIENT
Start: 2017-10-02 | End: 2017-10-02 | Stop reason: SDUPTHER

## 2017-10-02 RX ADMIN — CEFTRIAXONE 500 MG: 500 INJECTION, POWDER, FOR SOLUTION INTRAMUSCULAR; INTRAVENOUS at 13:39

## 2017-10-02 NOTE — PROGRESS NOTES
subjective     Chief Complaint   Patient presents with   • Follow-up   • Hypertension   • Hyperlipidemia   • Diabetes     History of Present Illness  Patient is 62 years old white female who states that she is coughing for last 1 week.  It is getting worse.  It is productive of yellowish sputum.  No fever or chills.  Patient is hoarse ears and sinuses are congested according to her.    HYPERTENSION  Leonarda Diaz has long-standing essential hypertension for years. she is taking medications regularly. There are no medication side effects. Blood pressure is very well controlled. There has been no headache nausea chest pain. There has been no syncopal or presyncopal episode. she denies episodes of hypo-tension or accelerated hypertension.      Diabetes Mellitus   Leonarda Diaz has type 2 diabetes mellitus. Taking oral hypoglycemic agents. Tolerating medications very well. she sugar is fluctuating. she is trying to diet and lose weight and exercise. There are no diabetic complications.      Hyperlipidemia  Leonarda Diaz has long-standing history of hyperlipidemia. Has been trying to lose weight following diet and exercise. Patient is not taking Lipitor.  Patient states that she cannot tolerate it she tried even the lowest possible dose and to quit even down to 1 or 2 pills a week but she gets severe aching all over and cannot tolerate the medication she has not taken any in at least a month.      Hypothyroidism  Leonarda Diaz has long-standing history of hypothyroidism.she is taking Synthroid. Doing very well. No drug side effects. No change in Weight. No cold intolerance. denies any constipation. No hair loss. No dry skin.      Patient is hurting quite a bit. She is taking Plaquenil 200 mg daily and the patient required to take hydrocodone 5/325 up to 3 a day.        Past Surgical History:   Procedure Laterality Date   • CHOLECYSTECTOMY     • COLONOSCOPY W/ BIOPSIES  2012   • HYSTERECTOMY     • VARICOSE VEIN SURGERY Right       Family History   Problem Relation Age of Onset   • Thyroid cancer Mother    • Aneurysm Mother    • Hypertension Mother    • Diabetes Mother    • Arthritis Mother    • Heart attack Father    • Heart disease Father    • Diabetes Brother    • Kidney failure Brother    • Hypertension Brother    • Diabetes Brother    • Hypertension Brother    • Breast cancer Maternal Aunt      Past Medical History:   Diagnosis Date   • B12 deficiency    • Bronchitis, acute    • Chronic pain syndrome    • COPD (chronic obstructive pulmonary disease)    • Diabetes mellitus    • Disease of thyroid gland    • Hyperlipidemia    • Hypertension      Patient Active Problem List   Diagnosis   • Acute bronchitis   • B12 deficiency*   • Chronic pain syndrome*   • Hypertension*   • Hyperlipidemia*   • Diabetes mellitus*   • Hypothyroidism*   • Vitamin D deficiency*       Social History   Substance Use Topics   • Smoking status: Current Every Day Smoker     Years: 10.00     Types: Electronic Cigarette   • Smokeless tobacco: Never Used   • Alcohol use No       Allergies   Allergen Reactions   • Avelox [Moxifloxacin]        Current Outpatient Prescriptions on File Prior to Visit   Medication Sig   • atorvastatin (LIPITOR) 10 MG tablet Take 1 tablet by mouth Daily.   • furosemide (LASIX) 20 MG tablet Take 1 tablet by mouth 2 (two) times a day.   • hydroxychloroquine (PLAQUENIL) 200 MG tablet Take 200 mg by mouth daily.   • ipratropium-albuterol (COMBIVENT RESPIMAT)  MCG/ACT inhaler Inhale 1 puff 4 (Four) Times a Day As Needed for Wheezing.   • metoprolol tartrate (LOPRESSOR) 25 MG tablet Take 1 tablet by mouth 2 (Two) Times a Day.   • montelukast (SINGULAIR) 10 MG tablet Take 1 tablet by mouth Every Night.   • ONE TOUCH LANCETS [facility] [place] USES TWICE A DAY TO CHECK BLOOD SUGAR   • vitamin B-12 (CYANOCOBALAMIN) 500 MCG tablet Take 1,000 mcg by mouth daily.   • [DISCONTINUED] glucose blood test strip 1 each by Other route Daily. Use as instructed   •  "[DISCONTINUED] HYDROcodone-acetaminophen (NORCO) 5-325 MG per tablet Take 1 tablet by mouth Every 8 (Eight) Hours As Needed for Moderate Pain (4-6).   • [DISCONTINUED] levothyroxine (SYNTHROID, LEVOTHROID) 75 MCG tablet Take 1 tablet by mouth Daily.   • [DISCONTINUED] linagliptin (TRADJENTA) 5 MG tablet tablet Take 1 tablet by mouth Daily.   • [DISCONTINUED] metFORMIN (GLUCOPHAGE) 1000 MG tablet Take 1 tablet by mouth 2 (Two) Times a Day With Meals.     No current facility-administered medications on file prior to visit.          The following portions of the patient's history were reviewed and updated as appropriate: allergies, current medications, past family history, past medical history, past social history, past surgical history and problem list.    Review of Systems   Constitution: Negative.   HENT: Positive for congestion, hoarse voice and sore throat.    Eyes: Negative.    Cardiovascular: Negative.  Negative for chest pain, cyanosis, dyspnea on exertion, irregular heartbeat, leg swelling, near-syncope, orthopnea, palpitations, paroxysmal nocturnal dyspnea and syncope.   Respiratory: Positive for cough, sputum production and wheezing. Negative for hemoptysis, shortness of breath, sleep disturbances due to breathing and snoring.    Hematologic/Lymphatic: Negative.    Skin: Negative.    Musculoskeletal: Negative.    Gastrointestinal: Negative.    Neurological: Negative.  Negative for headaches.          Objective:     /76 (BP Location: Left arm, Patient Position: Sitting)  Pulse 71  Ht 62\" (157.5 cm)  Wt 162 lb 6.4 oz (73.7 kg)  SpO2 98%  BMI 29.7 kg/m2  Physical Exam   Constitutional: She appears well-developed and well-nourished. No distress.   HENT:   Head: Normocephalic and atraumatic.   Mouth/Throat: Oropharyngeal exudate present.   Eyes: Conjunctivae and EOM are normal. Pupils are equal, round, and reactive to light. No scleral icterus.   Neck: Normal range of motion. Neck supple. No JVD " present. No tracheal deviation present. No thyromegaly present.   Cardiovascular: Normal rate, regular rhythm, normal heart sounds and intact distal pulses.  PMI is not displaced.  Exam reveals no gallop, no friction rub and no decreased pulses.    No murmur heard.  Pulses:       Carotid pulses are 3+ on the right side, and 3+ on the left side.       Radial pulses are 3+ on the right side, and 3+ on the left side.   Pulmonary/Chest: Effort normal. No respiratory distress. She has wheezes. She has no rales. She exhibits no tenderness.   Abdominal: Soft. Bowel sounds are normal. She exhibits no distension, no abdominal bruit and no mass. There is no splenomegaly or hepatomegaly. There is no tenderness. There is no rebound and no guarding.   Musculoskeletal: Normal range of motion. She exhibits no edema, tenderness or deformity.   Lymphadenopathy:     She has no cervical adenopathy.   Neurological: She is alert. She has normal reflexes. No cranial nerve deficit. She exhibits normal muscle tone. Coordination normal.   Skin: Skin is warm and dry. No rash noted. She is not diaphoretic. No erythema.   Psychiatric: She has a normal mood and affect. Her behavior is normal. Judgment and thought content normal.         Lab Review  Lab Results   Component Value Date     09/08/2017    K 4.2 09/08/2017     09/08/2017    BUN 11 09/08/2017    CREATININE 0.73 09/08/2017    GLUCOSE 123 (H) 09/08/2017    CALCIUM 9.5 09/08/2017    ALT 13 09/08/2017    ALKPHOS 65 09/08/2017    LABIL2 1.5 09/08/2017     Lab Results   Component Value Date    CKTOTAL 176 (H) 09/08/2017     Lab Results   Component Value Date    WBC 8.98 09/08/2017    HGB 13.0 09/08/2017    HCT 39.8 09/08/2017     09/08/2017     No results found for: INR  No results found for: MG  Lab Results   Component Value Date    TSH 4.961 (H) 09/08/2017     No results found for: BNP  Lab Results   Component Value Date    CHOL 170 09/08/2017    TRIG 140 09/08/2017     HDL 42 (L) 09/08/2017    LDLCALC 100 09/08/2017    VLDL 28 09/08/2017    LDLHDL 2.38 09/08/2017         Procedures       I personally viewed and interpreted the patient's LAB data         Assessment:     1. Acute bronchitis, unspecified organism    2. Essential hypertension    3. Other emphysema    4. Other specified hypothyroidism    5. Upper respiratory tract infection, unspecified type    6. Mixed hyperlipidemia    7. Type 2 diabetes mellitus without complication, without long-term current use of insulin          Plan:      Patient has developed acute bronchitis.  She is afebrile but she is coughing a lot and has wheezing.  No respiratory difficulty.  Patient was given Kenalog shot and Rocephin shot.  Patient was also started on tapering dose of prednisone and Augmentin.  Lab work reviewed and discussed with the patient.  TSH is elevated.  Synthroid dose was increased to 100 µg daily.  Blood pressure is very well controlled.  She will continue current medications.  Lipids are also normal.  Lipitor was continued.  Blood sugar is mildly elevated.  Healthy lifestyle was discussed.    Because of severe cough patient was given Phenergan with codeine cough syrup also.  Healthy lifestyle discussed.  Follow-up scheduled      No Follow-up on file.

## 2017-12-20 ENCOUNTER — OFFICE VISIT (OUTPATIENT)
Dept: CARDIOLOGY | Facility: CLINIC | Age: 62
End: 2017-12-20

## 2017-12-20 VITALS
WEIGHT: 160 LBS | DIASTOLIC BLOOD PRESSURE: 80 MMHG | HEART RATE: 61 BPM | OXYGEN SATURATION: 97 % | SYSTOLIC BLOOD PRESSURE: 136 MMHG | BODY MASS INDEX: 29.44 KG/M2 | HEIGHT: 62 IN

## 2017-12-20 DIAGNOSIS — J44.1 COPD EXACERBATION (HCC): Primary | ICD-10-CM

## 2017-12-20 DIAGNOSIS — E53.8 B12 DEFICIENCY: ICD-10-CM

## 2017-12-20 DIAGNOSIS — G89.4 CHRONIC PAIN SYNDROME: ICD-10-CM

## 2017-12-20 DIAGNOSIS — E03.9 HYPOTHYROIDISM, UNSPECIFIED TYPE: ICD-10-CM

## 2017-12-20 DIAGNOSIS — J20.9 ACUTE BRONCHITIS, UNSPECIFIED ORGANISM: ICD-10-CM

## 2017-12-20 DIAGNOSIS — E11.9 TYPE 2 DIABETES MELLITUS WITHOUT COMPLICATION, WITHOUT LONG-TERM CURRENT USE OF INSULIN (HCC): ICD-10-CM

## 2017-12-20 DIAGNOSIS — E78.2 MIXED HYPERLIPIDEMIA: ICD-10-CM

## 2017-12-20 DIAGNOSIS — I10 ESSENTIAL HYPERTENSION: ICD-10-CM

## 2017-12-20 DIAGNOSIS — J43.8 OTHER EMPHYSEMA (HCC): ICD-10-CM

## 2017-12-20 DIAGNOSIS — E55.9 VITAMIN D DEFICIENCY: ICD-10-CM

## 2017-12-20 DIAGNOSIS — E03.8 OTHER SPECIFIED HYPOTHYROIDISM: ICD-10-CM

## 2017-12-20 PROBLEM — H92.01 RIGHT EAR PAIN: Status: ACTIVE | Noted: 2017-12-20

## 2017-12-20 PROCEDURE — 99214 OFFICE O/P EST MOD 30 MIN: CPT | Performed by: INTERNAL MEDICINE

## 2017-12-20 RX ORDER — HYDROCODONE BITARTRATE AND ACETAMINOPHEN 5; 325 MG/1; MG/1
1 TABLET ORAL EVERY 8 HOURS PRN
Qty: 90 TABLET | Refills: 0 | Status: SHIPPED | OUTPATIENT
Start: 2017-12-20 | End: 2018-03-20 | Stop reason: SDUPTHER

## 2017-12-20 RX ORDER — PREDNISONE 1 MG/1
5 TABLET ORAL DAILY
Qty: 90 TABLET | Refills: 0 | Status: SHIPPED | OUTPATIENT
Start: 2017-12-20 | End: 2018-01-05

## 2017-12-20 RX ORDER — ALBUTEROL SULFATE 90 UG/1
2 AEROSOL, METERED RESPIRATORY (INHALATION) EVERY 4 HOURS PRN
Qty: 6.7 G | Refills: 3 | Status: SHIPPED | OUTPATIENT
Start: 2017-12-20 | End: 2018-03-20 | Stop reason: SDUPTHER

## 2017-12-20 NOTE — PROGRESS NOTES
subjective     Chief Complaint   Patient presents with   • Hypertension   • Hyperlipidemia   • Follow-up     History of Present Illness  Patient is 62 years old white female with multiple chronic medical problems.  Patient is here for follow-up.  She has a hyperlipidemia.  Patient is taking Lipitor 10 mg daily.  She is tolerating medications very well.  She is also trying to diet    Blood pressure is well controlled.  No drug side effects.  She is taking Lopressor 25 twice a day and Lasix 20 mg daily.    Patient also has diabetes mellitus.  Which has been difficult to control.  Patient is currently taking Tradjenta and Glucophage and is doing very well.    Patient has COPD and is taking Combivent respimat.  She complains of marked wheezing and shortness of breath.  She is coughing a lot without any expectoration.  No fever or chills.  She also has hypothyroidism and on Synthroid replacement therapy.  Patient also is seeing arthritis specialist is taking Plaquenil.    Past Surgical History:   Procedure Laterality Date   • CHOLECYSTECTOMY     • COLONOSCOPY W/ BIOPSIES  2012   • HYSTERECTOMY     • VARICOSE VEIN SURGERY Right      Family History   Problem Relation Age of Onset   • Thyroid cancer Mother    • Aneurysm Mother    • Hypertension Mother    • Diabetes Mother    • Arthritis Mother    • Heart attack Father    • Heart disease Father    • Diabetes Brother    • Kidney failure Brother    • Hypertension Brother    • Diabetes Brother    • Hypertension Brother    • Breast cancer Maternal Aunt      Past Medical History:   Diagnosis Date   • B12 deficiency    • Bronchitis, acute    • Chronic pain syndrome    • COPD (chronic obstructive pulmonary disease)    • Diabetes mellitus    • Disease of thyroid gland    • Hyperlipidemia    • Hypertension      Patient Active Problem List   Diagnosis   • Acute bronchitis   • B12 deficiency*   • Chronic pain syndrome*   • Hypertension*   • Hyperlipidemia*   • Diabetes mellitus*   •  Hypothyroidism*   • Vitamin D deficiency*   • Right ear pain       Social History   Substance Use Topics   • Smoking status: Current Every Day Smoker     Years: 10.00     Types: Electronic Cigarette   • Smokeless tobacco: Never Used   • Alcohol use No       Allergies   Allergen Reactions   • Avelox [Moxifloxacin]        Current Outpatient Prescriptions on File Prior to Visit   Medication Sig   • atorvastatin (LIPITOR) 10 MG tablet Take 1 tablet by mouth Daily.   • furosemide (LASIX) 20 MG tablet Take 1 tablet by mouth 2 (two) times a day.   • glucose blood test strip 1 each by Other route Daily. Use as instructed   • hydroxychloroquine (PLAQUENIL) 200 MG tablet Take 200 mg by mouth daily.   • levothyroxine (SYNTHROID, LEVOTHROID) 100 MCG tablet Take 1 tablet by mouth Daily.   • metoprolol tartrate (LOPRESSOR) 25 MG tablet Take 1 tablet by mouth 2 (Two) Times a Day.   • montelukast (SINGULAIR) 10 MG tablet Take 1 tablet by mouth Every Night.   • ONE TOUCH LANCETS [facility] [place] TO CHECK BLOOD SUGAR   • vitamin B-12 (CYANOCOBALAMIN) 500 MCG tablet Take 1,000 mcg by mouth daily.   • [DISCONTINUED] HYDROcodone-acetaminophen (NORCO) 5-325 MG per tablet Take 1 tablet by mouth Every 8 (Eight) Hours As Needed for Moderate Pain .   • [DISCONTINUED] ipratropium-albuterol (COMBIVENT RESPIMAT)  MCG/ACT inhaler Inhale 1 puff 4 (Four) Times a Day As Needed for Wheezing.   • [DISCONTINUED] linagliptin (TRADJENTA) 5 MG tablet tablet Take 1 tablet by mouth Daily.   • [DISCONTINUED] metFORMIN (GLUCOPHAGE) 1000 MG tablet Take 1 tablet by mouth 2 (Two) Times a Day With Meals.   • [DISCONTINUED] predniSONE (DELTASONE) 10 MG tablet Take 1 tablet by mouth Daily. 2 daily x 5 days,1 daily x 7 days,0.5 daily x 6 days   • [DISCONTINUED] Promethazine-Phenyleph-Codeine (PROMETHAZINE VC/CODEINE) 6.25-5-10 MG/5ML syrup Take 5 mL by mouth Every 8 (Eight) Hours As Needed (cough).     Current Facility-Administered Medications on File  "Prior to Visit   Medication   • [DISCONTINUED] cefTRIAXone (ROCEPHIN) injection 500 mg         The following portions of the patient's history were reviewed and updated as appropriate: allergies, current medications, past family history, past medical history, past social history, past surgical history and problem list.    Review of Systems   Constitution: Negative.   HENT: Negative.  Negative for congestion.    Eyes: Negative.    Cardiovascular: Positive for dyspnea on exertion. Negative for chest pain, cyanosis, irregular heartbeat, leg swelling, near-syncope, orthopnea, palpitations, paroxysmal nocturnal dyspnea and syncope.   Respiratory: Positive for cough, shortness of breath and wheezing. Negative for sputum production.    Hematologic/Lymphatic: Negative.    Musculoskeletal: Positive for arthritis, back pain, myalgias and stiffness.   Gastrointestinal: Negative.    Neurological: Negative.  Negative for headaches.          Objective:     /80  Pulse 61  Ht 157.5 cm (62\")  Wt 72.6 kg (160 lb)  SpO2 97%  BMI 29.26 kg/m2  Physical Exam   Constitutional: She appears well-developed and well-nourished. No distress.   HENT:   Head: Normocephalic and atraumatic.   Mouth/Throat: Oropharynx is clear and moist. No oropharyngeal exudate.   Eyes: Conjunctivae and EOM are normal. Pupils are equal, round, and reactive to light. No scleral icterus.   Neck: Normal range of motion. Neck supple. No JVD present. No tracheal deviation present. No thyromegaly present.   Cardiovascular: Normal rate, regular rhythm, normal heart sounds and intact distal pulses.  PMI is not displaced.  Exam reveals no gallop, no friction rub and no decreased pulses.    No murmur heard.  Pulses:       Carotid pulses are 3+ on the right side, and 3+ on the left side.       Radial pulses are 3+ on the right side, and 3+ on the left side.   Pulmonary/Chest: Effort normal. No respiratory distress. She has wheezes. She has no rales. She exhibits no " tenderness.   Abdominal: Soft. Bowel sounds are normal. She exhibits no distension, no abdominal bruit and no mass. There is no splenomegaly or hepatomegaly. There is no tenderness. There is no rebound and no guarding.   Musculoskeletal: Normal range of motion. She exhibits no edema, tenderness or deformity.   Lymphadenopathy:     She has no cervical adenopathy.   Neurological: She is alert. She has normal reflexes. No cranial nerve deficit. She exhibits normal muscle tone. Coordination normal.   Skin: Skin is warm and dry. No rash noted. She is not diaphoretic. No erythema.   Psychiatric: She has a normal mood and affect. Her behavior is normal. Judgment and thought content normal.         Lab Review  Lab Results   Component Value Date     09/08/2017    K 4.2 09/08/2017     09/08/2017    BUN 11 09/08/2017    CREATININE 0.73 09/08/2017    GLUCOSE 123 (H) 09/08/2017    CALCIUM 9.5 09/08/2017    ALT 13 09/08/2017    ALKPHOS 65 09/08/2017    LABIL2 1.5 09/08/2017     Lab Results   Component Value Date    CKTOTAL 176 (H) 09/08/2017     Lab Results   Component Value Date    WBC 8.98 09/08/2017    HGB 13.0 09/08/2017    HCT 39.8 09/08/2017     09/08/2017     No results found for: INR  No results found for: MG  Lab Results   Component Value Date    TSH 4.961 (H) 09/08/2017     No results found for: BNP  Lab Results   Component Value Date    CHLPL 184 04/28/2016    CHOL 170 09/08/2017    TRIG 140 09/08/2017    HDL 42 (L) 09/08/2017    LDLCALC 100 09/08/2017    VLDL 28 09/08/2017    LDLHDL 2.38 09/08/2017         Procedures       I personally viewed and interpreted the patient's LAB data         Assessment:     1. COPD exacerbation    2. Mixed hyperlipidemia    3. Essential hypertension    4. B12 deficiency*    5. Vitamin D deficiency*    6. Type 2 diabetes mellitus without complication, without long-term current use of insulin    7. Hypothyroidism, unspecified type    8. Chronic pain syndrome*    9. Acute  bronchitis, unspecified organism    10. Other emphysema    11. Other specified hypothyroidism          Plan:      Patient has COPD with exacerbation.  She was advised to take Brio ellipta once a day and Proventil HFA every 6 hours when necessary.  She was also started on prednisone 5 mg daily.    Patient has not had lab work recently.  Labs were scheduled to check for thyroid functions lipids, CBC and CMP along with hemoglobin A1c B12, vitamin D and urine for microalbumin.    Unfortunately patient still smokes cessation of smoking was very strongly urged.  Healthy lifestyle aggressive risk factor modification was emphasized.  Follow-up scheduled      Return in about 3 months (around 3/20/2018).

## 2018-01-04 DIAGNOSIS — I10 ESSENTIAL HYPERTENSION: ICD-10-CM

## 2018-01-04 DIAGNOSIS — J43.8 OTHER EMPHYSEMA (HCC): ICD-10-CM

## 2018-01-04 DIAGNOSIS — E03.8 OTHER SPECIFIED HYPOTHYROIDISM: ICD-10-CM

## 2018-01-05 ENCOUNTER — OFFICE VISIT (OUTPATIENT)
Dept: CARDIOLOGY | Facility: CLINIC | Age: 63
End: 2018-01-05

## 2018-01-05 ENCOUNTER — HOSPITAL ENCOUNTER (OUTPATIENT)
Dept: GENERAL RADIOLOGY | Facility: HOSPITAL | Age: 63
Discharge: HOME | End: 2018-01-05
Attending: INTERNAL MEDICINE | Admitting: INTERNAL MEDICINE

## 2018-01-05 VITALS
BODY MASS INDEX: 28.7 KG/M2 | HEART RATE: 73 BPM | SYSTOLIC BLOOD PRESSURE: 162 MMHG | OXYGEN SATURATION: 96 % | DIASTOLIC BLOOD PRESSURE: 98 MMHG | WEIGHT: 162 LBS | HEIGHT: 63 IN

## 2018-01-05 DIAGNOSIS — J20.9 ACUTE BRONCHITIS, UNSPECIFIED ORGANISM: ICD-10-CM

## 2018-01-05 DIAGNOSIS — J41.0 SIMPLE CHRONIC BRONCHITIS (HCC): ICD-10-CM

## 2018-01-05 DIAGNOSIS — J20.9 ACUTE BRONCHITIS, UNSPECIFIED ORGANISM: Primary | ICD-10-CM

## 2018-01-05 DIAGNOSIS — I10 ESSENTIAL HYPERTENSION: ICD-10-CM

## 2018-01-05 DIAGNOSIS — E78.2 MIXED HYPERLIPIDEMIA: ICD-10-CM

## 2018-01-05 LAB
FLUAV AG NPH QL: NEGATIVE
FLUBV AG NPH QL IA: NEGATIVE
S PYO AG THROAT QL: NEGATIVE

## 2018-01-05 PROCEDURE — 87880 STREP A ASSAY W/OPTIC: CPT | Performed by: INTERNAL MEDICINE

## 2018-01-05 PROCEDURE — 87804 INFLUENZA ASSAY W/OPTIC: CPT | Performed by: INTERNAL MEDICINE

## 2018-01-05 PROCEDURE — 87081 CULTURE SCREEN ONLY: CPT | Performed by: INTERNAL MEDICINE

## 2018-01-05 PROCEDURE — 71046 X-RAY EXAM CHEST 2 VIEWS: CPT

## 2018-01-05 PROCEDURE — 71046 X-RAY EXAM CHEST 2 VIEWS: CPT | Performed by: RADIOLOGY

## 2018-01-05 PROCEDURE — 99214 OFFICE O/P EST MOD 30 MIN: CPT | Performed by: INTERNAL MEDICINE

## 2018-01-05 RX ORDER — PREDNISONE 1 MG/1
10 TABLET ORAL DAILY
Qty: 90 TABLET | Refills: 0 | OUTPATIENT
Start: 2018-01-05 | End: 2018-03-20

## 2018-01-05 RX ORDER — AZITHROMYCIN 250 MG/1
TABLET, FILM COATED ORAL
Qty: 6 TABLET | Refills: 0 | Status: SHIPPED | OUTPATIENT
Start: 2018-01-05 | End: 2018-03-20

## 2018-01-05 RX ORDER — PROMETHAZINE HCL/CODEINE 6.25-10/5
5 SYRUP ORAL EVERY 4 HOURS PRN
Qty: 90 ML | Refills: 0 | Status: SHIPPED | OUTPATIENT
Start: 2018-01-05 | End: 2018-03-20

## 2018-01-05 RX ORDER — PREDNISONE 1 MG/1
10 TABLET ORAL DAILY
Qty: 90 TABLET | Refills: 0 | OUTPATIENT
Start: 2018-01-05 | End: 2018-01-05 | Stop reason: SDUPTHER

## 2018-01-05 NOTE — PROGRESS NOTES
subjective     Chief Complaint   Patient presents with   • Cough   • URI     History of Present Illness  Patient is 62 years old white female who presents to the office complaining of runny nose and sinus congestion a lot of cough and wheezing.  She also complains of fever and generalized aches and pains all over.    Patient has severe COPD with multiple episodes of recurrent acute exacerbation.  She is taking albuterol inhaler along with fluticasone furoate -vilanterol 100/25  She is still taking prednisone 5 mg daily and Singulair 10 mg daily.    She denies any expectoration but is having lot of cough    Patient also has hyperlipidemia, hypothyroidism and diabetes mellitus.  Blood pressure is elevated today but patient says the blood pressure is good at home currently is high because of coughing a lot.  She was advised to check blood pressure closely and increase medications as needed she will call for further instructions.    Past Surgical History:   Procedure Laterality Date   • CHOLECYSTECTOMY     • COLONOSCOPY W/ BIOPSIES  2012   • HYSTERECTOMY     • VARICOSE VEIN SURGERY Right      Family History   Problem Relation Age of Onset   • Thyroid cancer Mother    • Aneurysm Mother    • Hypertension Mother    • Diabetes Mother    • Arthritis Mother    • Heart attack Father    • Heart disease Father    • Diabetes Brother    • Kidney failure Brother    • Hypertension Brother    • Diabetes Brother    • Hypertension Brother    • Breast cancer Maternal Aunt      Past Medical History:   Diagnosis Date   • B12 deficiency    • Bronchitis, acute    • Chronic pain syndrome    • COPD (chronic obstructive pulmonary disease)    • Diabetes mellitus    • Disease of thyroid gland    • Hyperlipidemia    • Hypertension      Patient Active Problem List   Diagnosis   • Acute bronchitis   • B12 deficiency*   • Chronic pain syndrome*   • Hypertension*   • Hyperlipidemia*   • Diabetes mellitus*   • Hypothyroidism*   • Vitamin D deficiency*    • Right ear pain       Social History   Substance Use Topics   • Smoking status: Current Every Day Smoker     Years: 10.00     Types: Electronic Cigarette   • Smokeless tobacco: Never Used   • Alcohol use No       Allergies   Allergen Reactions   • Avelox [Moxifloxacin]        Current Outpatient Prescriptions on File Prior to Visit   Medication Sig   • albuterol (PROVENTIL HFA;VENTOLIN HFA) 108 (90 Base) MCG/ACT inhaler Inhale 2 puffs Every 4 (Four) Hours As Needed for Wheezing.   • atorvastatin (LIPITOR) 10 MG tablet Take 1 tablet by mouth Daily.   • Fluticasone Furoate-Vilanterol 100-25 MCG/INH aerosol powder  Inhale 1 puff Daily.   • furosemide (LASIX) 20 MG tablet Take 1 tablet by mouth 2 (two) times a day.   • glucose blood test strip 1 each by Other route Daily. Use as instructed   • HYDROcodone-acetaminophen (NORCO) 5-325 MG per tablet Take 1 tablet by mouth Every 8 (Eight) Hours As Needed for Moderate Pain .   • hydroxychloroquine (PLAQUENIL) 200 MG tablet Take 200 mg by mouth daily.   • levothyroxine (SYNTHROID, LEVOTHROID) 100 MCG tablet Take 1 tablet by mouth Daily.   • linagliptin (TRADJENTA) 5 MG tablet tablet Take 1 tablet by mouth Daily.   • metFORMIN (GLUCOPHAGE) 1000 MG tablet Take 1 tablet by mouth 2 (Two) Times a Day With Meals.   • metoprolol tartrate (LOPRESSOR) 25 MG tablet Take 1 tablet by mouth Daily.   • montelukast (SINGULAIR) 10 MG tablet Take 1 tablet by mouth Every Night.   • ONE TOUCH LANCETS [facility] [place] USES TWICE A DAY TO CHECK BLOOD SUGAR   • vitamin B-12 (CYANOCOBALAMIN) 500 MCG tablet Take 1,000 mcg by mouth daily.     No current facility-administered medications on file prior to visit.          The following portions of the patient's history were reviewed and updated as appropriate: allergies, current medications, past family history, past medical history, past social history, past surgical history and problem list.    Review of Systems   Constitution: Positive for chills and fever.  "  HENT: Negative.  Negative for congestion.    Eyes: Negative.    Cardiovascular: Negative.  Negative for chest pain, cyanosis, dyspnea on exertion, irregular heartbeat, leg swelling, near-syncope, orthopnea, palpitations, paroxysmal nocturnal dyspnea and syncope.   Respiratory: Positive for cough and wheezing. Negative for hemoptysis, shortness of breath and sputum production.    Hematologic/Lymphatic: Negative.    Musculoskeletal: Positive for myalgias.   Gastrointestinal: Negative.    Neurological: Negative.  Negative for headaches.          Objective:     /98 (BP Location: Left arm, Patient Position: Sitting, Cuff Size: Adult)  Pulse 73  Ht 160 cm (63\")  Wt 73.5 kg (162 lb)  SpO2 96%  BMI 28.7 kg/m2  Physical Exam   Constitutional: She appears well-developed and well-nourished. No distress.   HENT:   Head: Normocephalic and atraumatic.   Mouth/Throat: Oropharynx is clear and moist. No oropharyngeal exudate.   Eyes: Conjunctivae and EOM are normal. Pupils are equal, round, and reactive to light. No scleral icterus.   Neck: Normal range of motion. Neck supple. No JVD present. No tracheal deviation present. No thyromegaly present.   Cardiovascular: Normal rate, regular rhythm, normal heart sounds and intact distal pulses.  PMI is not displaced.  Exam reveals no gallop, no friction rub and no decreased pulses.    No murmur heard.  Pulses:       Carotid pulses are 3+ on the right side, and 3+ on the left side.       Radial pulses are 3+ on the right side, and 3+ on the left side.   Pulmonary/Chest: Effort normal. No respiratory distress. She has wheezes. She has no rales. She exhibits no tenderness.   Abdominal: Soft. Bowel sounds are normal. She exhibits no distension, no abdominal bruit and no mass. There is no splenomegaly or hepatomegaly. There is no tenderness. There is no rebound and no guarding.   Musculoskeletal: Normal range of motion. She exhibits no edema, tenderness or deformity. "   Lymphadenopathy:     She has no cervical adenopathy.   Neurological: She is alert. She has normal reflexes. No cranial nerve deficit. She exhibits normal muscle tone. Coordination normal.   Skin: Skin is warm and dry. No rash noted. She is not diaphoretic. No erythema.   Psychiatric: She has a normal mood and affect. Her behavior is normal. Judgment and thought content normal.         Lab Review  Lab Results   Component Value Date     09/08/2017    K 4.2 09/08/2017     09/08/2017    BUN 11 09/08/2017    CREATININE 0.73 09/08/2017    GLUCOSE 123 (H) 09/08/2017    CALCIUM 9.5 09/08/2017    ALT 13 09/08/2017    ALKPHOS 65 09/08/2017    LABIL2 1.5 09/08/2017     Lab Results   Component Value Date    CKTOTAL 176 (H) 09/08/2017     Lab Results   Component Value Date    WBC 8.98 09/08/2017    HGB 13.0 09/08/2017    HCT 39.8 09/08/2017     09/08/2017     No results found for: INR  No results found for: MG  Lab Results   Component Value Date    TSH 4.961 (H) 09/08/2017     No results found for: BNP  Lab Results   Component Value Date    CHLPL 184 04/28/2016    CHOL 170 09/08/2017    TRIG 140 09/08/2017    HDL 42 (L) 09/08/2017    LDLCALC 100 09/08/2017    VLDL 28 09/08/2017    LDLHDL 2.38 09/08/2017         Procedures       I personally viewed and interpreted the patient's LAB data         Assessment:     1. Acute bronchitis, unspecified organism    2. Mixed hyperlipidemia    3. Essential hypertension    4. Simple chronic bronchitis          Plan:      Patient has acute bronchitis and acute exacerbation of severe COPD.  She is having lot of cough which is nonproductive.  And she is wheezing quite a bit.  Patient was advised to take Phenergan with codeine cough syrup  Deltasone dose was temporarily increased.  She will continue her inhalers and Singulair.  Flu antigen screen was obtained.  Patient was given zithromax.  Steroid injection was also given.    Patient was advised to check blood pressure  closely and will call for further instructions.  Patient insists her blood pressure is good at home.    Chest x-ray and lab work was also scheduled.          No Follow-up on file.

## 2018-01-07 LAB — BACTERIA SPEC AEROBE CULT: NORMAL

## 2018-01-08 ENCOUNTER — TELEPHONE (OUTPATIENT)
Dept: CARDIOLOGY | Facility: CLINIC | Age: 63
End: 2018-01-08

## 2018-02-21 ENCOUNTER — LAB (OUTPATIENT)
Dept: LAB | Facility: HOSPITAL | Age: 63
End: 2018-02-21

## 2018-02-21 DIAGNOSIS — E11.9 TYPE 2 DIABETES MELLITUS WITHOUT COMPLICATION, WITHOUT LONG-TERM CURRENT USE OF INSULIN (HCC): ICD-10-CM

## 2018-02-21 DIAGNOSIS — E78.2 MIXED HYPERLIPIDEMIA: ICD-10-CM

## 2018-02-21 DIAGNOSIS — J20.9 ACUTE BRONCHITIS, UNSPECIFIED ORGANISM: ICD-10-CM

## 2018-02-21 DIAGNOSIS — E03.9 HYPOTHYROIDISM, UNSPECIFIED TYPE: ICD-10-CM

## 2018-02-21 DIAGNOSIS — E55.9 VITAMIN D DEFICIENCY: ICD-10-CM

## 2018-02-21 DIAGNOSIS — E53.8 B12 DEFICIENCY: ICD-10-CM

## 2018-02-21 LAB
25(OH)D3 SERPL-MCNC: 26 NG/ML
ALBUMIN SERPL-MCNC: 4.2 G/DL (ref 3.4–4.8)
ALBUMIN UR-MCNC: 80.2 MG/L
ALBUMIN/GLOB SERPL: 1.4 G/DL (ref 1.5–2.5)
ALP SERPL-CCNC: 67 U/L (ref 35–104)
ALT SERPL W P-5'-P-CCNC: 8 U/L (ref 10–36)
ANION GAP SERPL CALCULATED.3IONS-SCNC: 5.7 MMOL/L (ref 3.6–11.2)
AST SERPL-CCNC: 11 U/L (ref 10–30)
BASOPHILS # BLD AUTO: 0.04 10*3/MM3 (ref 0–0.3)
BASOPHILS NFR BLD AUTO: 0.4 % (ref 0–2)
BILIRUB SERPL-MCNC: 0.6 MG/DL (ref 0.2–1.8)
BUN BLD-MCNC: 12 MG/DL (ref 7–21)
BUN/CREAT SERPL: 17.9 (ref 7–25)
CALCIUM SPEC-SCNC: 9 MG/DL (ref 7.7–10)
CHLORIDE SERPL-SCNC: 107 MMOL/L (ref 99–112)
CHOLEST SERPL-MCNC: 183 MG/DL (ref 0–200)
CK SERPL-CCNC: 29 U/L (ref 24–173)
CO2 SERPL-SCNC: 28.3 MMOL/L (ref 24.3–31.9)
CREAT BLD-MCNC: 0.67 MG/DL (ref 0.43–1.29)
DEPRECATED RDW RBC AUTO: 45.6 FL (ref 37–54)
EOSINOPHIL # BLD AUTO: 0.19 10*3/MM3 (ref 0–0.7)
EOSINOPHIL NFR BLD AUTO: 1.8 % (ref 0–5)
ERYTHROCYTE [DISTWIDTH] IN BLOOD BY AUTOMATED COUNT: 13.8 % (ref 11.5–14.5)
GFR SERPL CREATININE-BSD FRML MDRD: 89 ML/MIN/1.73
GLOBULIN UR ELPH-MCNC: 2.9 GM/DL
GLUCOSE BLD-MCNC: 115 MG/DL (ref 70–110)
HBA1C MFR BLD: 5.8 % (ref 4.5–5.7)
HCT VFR BLD AUTO: 42.2 % (ref 37–47)
HDLC SERPL-MCNC: 44 MG/DL (ref 60–100)
HGB BLD-MCNC: 13.7 G/DL (ref 12–16)
IMM GRANULOCYTES # BLD: 0.02 10*3/MM3 (ref 0–0.03)
IMM GRANULOCYTES NFR BLD: 0.2 % (ref 0–0.5)
LDLC SERPL CALC-MCNC: 110 MG/DL (ref 0–100)
LDLC/HDLC SERPL: 2.49 {RATIO}
LYMPHOCYTES # BLD AUTO: 1.95 10*3/MM3 (ref 1–3)
LYMPHOCYTES NFR BLD AUTO: 18 % (ref 21–51)
MCH RBC QN AUTO: 30.4 PG (ref 27–33)
MCHC RBC AUTO-ENTMCNC: 32.5 G/DL (ref 33–37)
MCV RBC AUTO: 93.6 FL (ref 80–94)
MONOCYTES # BLD AUTO: 0.85 10*3/MM3 (ref 0.1–0.9)
MONOCYTES NFR BLD AUTO: 7.9 % (ref 0–10)
NEUTROPHILS # BLD AUTO: 7.76 10*3/MM3 (ref 1.4–6.5)
NEUTROPHILS NFR BLD AUTO: 71.7 % (ref 30–70)
OSMOLALITY SERPL CALC.SUM OF ELEC: 281.9 MOSM/KG (ref 273–305)
PLATELET # BLD AUTO: 343 10*3/MM3 (ref 130–400)
PMV BLD AUTO: 10.8 FL (ref 6–10)
POTASSIUM BLD-SCNC: 4.1 MMOL/L (ref 3.5–5.3)
PROT SERPL-MCNC: 7.1 G/DL (ref 6–8)
RBC # BLD AUTO: 4.51 10*6/MM3 (ref 4.2–5.4)
SODIUM BLD-SCNC: 141 MMOL/L (ref 135–153)
T4 FREE SERPL-MCNC: 1.77 NG/DL (ref 0.89–1.76)
TRIGL SERPL-MCNC: 147 MG/DL (ref 0–150)
TSH SERPL DL<=0.05 MIU/L-ACNC: 1.39 MIU/ML (ref 0.55–4.78)
VIT B12 BLD-MCNC: 165 PG/ML (ref 211–911)
VLDLC SERPL-MCNC: 29.4 MG/DL
WBC NRBC COR # BLD: 10.81 10*3/MM3 (ref 4.5–12.5)

## 2018-02-21 PROCEDURE — 84443 ASSAY THYROID STIM HORMONE: CPT

## 2018-02-21 PROCEDURE — 82550 ASSAY OF CK (CPK): CPT

## 2018-02-21 PROCEDURE — 82607 VITAMIN B-12: CPT

## 2018-02-21 PROCEDURE — 85025 COMPLETE CBC W/AUTO DIFF WBC: CPT

## 2018-02-21 PROCEDURE — 82043 UR ALBUMIN QUANTITATIVE: CPT

## 2018-02-21 PROCEDURE — 83036 HEMOGLOBIN GLYCOSYLATED A1C: CPT

## 2018-02-21 PROCEDURE — 80053 COMPREHEN METABOLIC PANEL: CPT

## 2018-02-21 PROCEDURE — 82306 VITAMIN D 25 HYDROXY: CPT

## 2018-02-21 PROCEDURE — 84439 ASSAY OF FREE THYROXINE: CPT

## 2018-02-21 PROCEDURE — 80061 LIPID PANEL: CPT

## 2018-03-20 ENCOUNTER — OFFICE VISIT (OUTPATIENT)
Dept: CARDIOLOGY | Facility: CLINIC | Age: 63
End: 2018-03-20

## 2018-03-20 VITALS
BODY MASS INDEX: 28 KG/M2 | SYSTOLIC BLOOD PRESSURE: 132 MMHG | HEART RATE: 67 BPM | WEIGHT: 158 LBS | HEIGHT: 63 IN | DIASTOLIC BLOOD PRESSURE: 82 MMHG

## 2018-03-20 DIAGNOSIS — E03.8 OTHER SPECIFIED HYPOTHYROIDISM: ICD-10-CM

## 2018-03-20 DIAGNOSIS — E78.2 MIXED HYPERLIPIDEMIA: Primary | ICD-10-CM

## 2018-03-20 DIAGNOSIS — J43.8 OTHER EMPHYSEMA (HCC): ICD-10-CM

## 2018-03-20 DIAGNOSIS — I10 ESSENTIAL HYPERTENSION: ICD-10-CM

## 2018-03-20 DIAGNOSIS — E13.10 UNCONTROLLED TYPE 2 DIABETES MELLITUS WITH KETOACIDOSIS WITHOUT COMA, UNSPECIFIED LONG TERM INSULIN USE STATUS: ICD-10-CM

## 2018-03-20 PROCEDURE — 99214 OFFICE O/P EST MOD 30 MIN: CPT | Performed by: INTERNAL MEDICINE

## 2018-03-20 RX ORDER — ATORVASTATIN CALCIUM 10 MG/1
10 TABLET, FILM COATED ORAL DAILY
Qty: 90 TABLET | Refills: 3 | Status: SHIPPED | OUTPATIENT
Start: 2018-03-20 | End: 2019-03-11 | Stop reason: SDUPTHER

## 2018-03-20 RX ORDER — MONTELUKAST SODIUM 10 MG/1
10 TABLET ORAL NIGHTLY
Qty: 90 TABLET | Refills: 2 | Status: SHIPPED | OUTPATIENT
Start: 2018-03-20 | End: 2018-12-11 | Stop reason: SDUPTHER

## 2018-03-20 RX ORDER — ALBUTEROL SULFATE 90 UG/1
2 AEROSOL, METERED RESPIRATORY (INHALATION) EVERY 4 HOURS PRN
Qty: 6.7 G | Refills: 3 | Status: SHIPPED | OUTPATIENT
Start: 2018-03-20 | End: 2019-03-11 | Stop reason: SDUPTHER

## 2018-03-20 RX ORDER — HYDROCODONE BITARTRATE AND ACETAMINOPHEN 5; 325 MG/1; MG/1
1 TABLET ORAL EVERY 8 HOURS PRN
Qty: 90 TABLET | Refills: 0 | Status: SHIPPED | OUTPATIENT
Start: 2018-03-20 | End: 2018-12-11 | Stop reason: ALTCHOICE

## 2018-03-20 RX ORDER — LEVOTHYROXINE SODIUM 0.1 MG/1
100 TABLET ORAL DAILY
Qty: 90 TABLET | Refills: 2 | Status: SHIPPED | OUTPATIENT
Start: 2018-03-20 | End: 2018-12-11 | Stop reason: SDUPTHER

## 2018-03-20 RX ORDER — AZITHROMYCIN 250 MG/1
TABLET, FILM COATED ORAL
Qty: 6 TABLET | Refills: 0 | Status: SHIPPED | OUTPATIENT
Start: 2018-03-20 | End: 2018-09-12

## 2018-03-20 RX ORDER — FUROSEMIDE 20 MG/1
20 TABLET ORAL DAILY
Qty: 90 TABLET | Refills: 1 | Status: SHIPPED | OUTPATIENT
Start: 2018-03-20 | End: 2019-03-11 | Stop reason: SDUPTHER

## 2018-03-20 NOTE — PROGRESS NOTES
subjective     Chief Complaint   Patient presents with   • Hypertension   • Hyperlipidemia   • Hypothyroidism   • Diabetes   • Follow-up     Patient reports Metoprolol dosage was decreased at her last refill and wants to double check the dosage today.   • Results     Labs completed 2/21/18 review today     History of Present Illness    Patient is 62 years old white female who is here for follow-up.  Patient has multiple chronic medical problems that she is here to discuss.  Patient has a hypertension which seems to be very well controlled.  She has no drug side effects.  She also has hyperlipidemia.  She is taking Lipitor 10 mg daily she is tolerating medications very well.  She also has hypothyroidism and diabetes mellitus.  Patient had lab work done which will be reviewed and discussed with the patient today.  Patient has multiple environmental allergies also she is taking Singulair  She complains of upper respiratory tract infection with sinus drainage which is yellowish greenish.  No fever or chills    Past Surgical History:   Procedure Laterality Date   • CHOLECYSTECTOMY     • COLONOSCOPY W/ BIOPSIES  2012   • HYSTERECTOMY     • VARICOSE VEIN SURGERY Right      Family History   Problem Relation Age of Onset   • Thyroid cancer Mother    • Aneurysm Mother    • Hypertension Mother    • Diabetes Mother    • Arthritis Mother    • Heart attack Father    • Heart disease Father    • Diabetes Brother    • Kidney failure Brother    • Hypertension Brother    • Diabetes Brother    • Hypertension Brother    • Breast cancer Maternal Aunt      Past Medical History:   Diagnosis Date   • B12 deficiency    • Bronchitis, acute    • Chronic pain syndrome    • COPD (chronic obstructive pulmonary disease)    • Diabetes mellitus    • Disease of thyroid gland    • Hyperlipidemia    • Hypertension      Patient Active Problem List   Diagnosis   • Acute bronchitis   • B12 deficiency*   • Chronic pain syndrome*   • Hypertension*   •  Hyperlipidemia*   • Diabetes mellitus*   • Hypothyroidism*   • Vitamin D deficiency*   • Right ear pain       Social History   Substance Use Topics   • Smoking status: Current Every Day Smoker     Years: 10.00     Types: Electronic Cigarette   • Smokeless tobacco: Never Used   • Alcohol use No       Allergies   Allergen Reactions   • Avelox [Moxifloxacin]        Current Outpatient Prescriptions on File Prior to Visit   Medication Sig   • albuterol (PROVENTIL HFA;VENTOLIN HFA) 108 (90 Base) MCG/ACT inhaler Inhale 2 puffs Every 4 (Four) Hours As Needed for Wheezing.   • atorvastatin (LIPITOR) 10 MG tablet Take 1 tablet by mouth Daily.   • Fluticasone Furoate-Vilanterol 100-25 MCG/INH aerosol powder  Inhale 1 puff Daily.   • furosemide (LASIX) 20 MG tablet Take 1 tablet by mouth 2 (two) times a day.   • glucose blood test strip 1 each by Other route Daily. Use as instructed   • HYDROcodone-acetaminophen (NORCO) 5-325 MG per tablet Take 1 tablet by mouth Every 8 (Eight) Hours As Needed for Moderate Pain .   • hydroxychloroquine (PLAQUENIL) 200 MG tablet Take 200 mg by mouth daily.   • levothyroxine (SYNTHROID, LEVOTHROID) 100 MCG tablet Take 1 tablet by mouth Daily.   • linagliptin (TRADJENTA) 5 MG tablet tablet Take 1 tablet by mouth Daily.   • metFORMIN (GLUCOPHAGE) 1000 MG tablet Take 1 tablet by mouth 2 (Two) Times a Day With Meals.   • metoprolol tartrate (LOPRESSOR) 25 MG tablet Take 1 tablet by mouth Daily.   • montelukast (SINGULAIR) 10 MG tablet Take 1 tablet by mouth Every Night.   • ONE TOUCH LANCETS [facility] [place] USES TWICE A DAY TO CHECK BLOOD SUGAR   • vitamin B-12 (CYANOCOBALAMIN) 500 MCG tablet Take 1,000 mcg by mouth daily.   • [DISCONTINUED] predniSONE (DELTASONE) 5 MG tablet Take 2 tablets by mouth Daily. 2 tab daily for 1 week and then 1 daily   • [DISCONTINUED] azithromycin (ZITHROMAX) 250 MG tablet Take 2 tablets the first day, then 1 tablet daily for 4 days.   • [DISCONTINUED] promethazine-codeine  "(PHENERGAN with CODEINE) 6.25-10 MG/5ML syrup Take 5 mL by mouth Every 4 (Four) Hours As Needed for Cough.     No current facility-administered medications on file prior to visit.          The following portions of the patient's history were reviewed and updated as appropriate: allergies, current medications, past family history, past medical history, past social history, past surgical history and problem list.    Review of Systems   Constitution: Negative.   HENT: Positive for congestion.    Eyes: Negative.    Cardiovascular: Positive for palpitations. Negative for chest pain, cyanosis, dyspnea on exertion, irregular heartbeat, leg swelling, near-syncope, orthopnea, paroxysmal nocturnal dyspnea and syncope.   Respiratory: Negative.  Negative for shortness of breath.    Hematologic/Lymphatic: Negative.    Musculoskeletal: Negative.    Gastrointestinal: Negative.    Neurological: Negative.  Negative for headaches.          Objective:     /82 (BP Location: Left arm, Patient Position: Sitting)   Pulse 67   Ht 160 cm (63\")   Wt 71.7 kg (158 lb)   BMI 27.99 kg/m²   Physical Exam   Constitutional: She appears well-developed and well-nourished. No distress.   HENT:   Head: Normocephalic and atraumatic.   Mouth/Throat: Oropharynx is clear and moist. No oropharyngeal exudate.   Eyes: Conjunctivae and EOM are normal. Pupils are equal, round, and reactive to light. No scleral icterus.   Neck: Normal range of motion. Neck supple. No JVD present. No tracheal deviation present. No thyromegaly present.   Cardiovascular: Normal rate, regular rhythm, normal heart sounds and intact distal pulses.  PMI is not displaced.  Exam reveals no gallop, no friction rub and no decreased pulses.    No murmur heard.  Pulses:       Carotid pulses are 3+ on the right side, and 3+ on the left side.       Radial pulses are 3+ on the right side, and 3+ on the left side.   Pulmonary/Chest: Effort normal and breath sounds normal. No " respiratory distress. She has no wheezes. She has no rales. She exhibits no tenderness.   Abdominal: Soft. Bowel sounds are normal. She exhibits no distension, no abdominal bruit and no mass. There is no splenomegaly or hepatomegaly. There is no tenderness. There is no rebound and no guarding.   Musculoskeletal: Normal range of motion. She exhibits no edema, tenderness or deformity.   Lymphadenopathy:     She has no cervical adenopathy.   Neurological: She is alert. She has normal reflexes. No cranial nerve deficit. She exhibits normal muscle tone. Coordination normal.   Skin: Skin is warm and dry. No rash noted. She is not diaphoretic. No erythema.   Psychiatric: She has a normal mood and affect. Her behavior is normal. Judgment and thought content normal.         Lab Review  Lab Results   Component Value Date     02/21/2018    K 4.1 02/21/2018     02/21/2018    BUN 12 02/21/2018    CREATININE 0.67 02/21/2018    GLUCOSE 115 (H) 02/21/2018    CALCIUM 9.0 02/21/2018    ALT 8 (L) 02/21/2018    ALKPHOS 67 02/21/2018    LABIL2 1.4 (L) 02/21/2018     Lab Results   Component Value Date    CKTOTAL 29 02/21/2018     Lab Results   Component Value Date    WBC 10.81 02/21/2018    HGB 13.7 02/21/2018    HCT 42.2 02/21/2018     02/21/2018     No results found for: INR  No results found for: MG  Lab Results   Component Value Date    TSH 1.391 02/21/2018     No results found for: BNP  Lab Results   Component Value Date    CHLPL 184 04/28/2016    CHOL 183 02/21/2018    TRIG 147 02/21/2018    HDL 44 (L) 02/21/2018    VLDL 29.4 02/21/2018    LDLHDL 2.49 02/21/2018         Procedures       I personally viewed and interpreted the patient's LAB data         Assessment:     1. Mixed hyperlipidemia    2. Uncontrolled type 2 diabetes mellitus with ketoacidosis without coma, unspecified long term insulin use status    3. Essential hypertension    4. Other emphysema    5. Other specified hypothyroidism          Plan:    Labs reviewed and discussed with the patient  CBC CMP lipid panel thyroid functions are normal.  She will continue current medications refills were given.  She still complains of some palpitations.  She was supposed be taking Lopressor 25 twice a day but somehow the prescription was for only one a day.  Lopressor dose will be increased to metoprolol tartrate 25 twice a day.  Refills were given.  Diabetic control is also good.  Thyroid functions are normal.  Patient also has arthritis in back pain Norco prescription was given.  Side effects were discussed.  Patient complains of sinus infection.  She was advised to continue Singulair.  Zithromax were given per patient's request.  Follow-up scheduled           No Follow-up on file.

## 2018-03-28 DIAGNOSIS — I10 ESSENTIAL HYPERTENSION: ICD-10-CM

## 2018-03-28 DIAGNOSIS — E03.8 OTHER SPECIFIED HYPOTHYROIDISM: ICD-10-CM

## 2018-03-28 DIAGNOSIS — J43.8 OTHER EMPHYSEMA (HCC): ICD-10-CM

## 2018-06-05 RX ORDER — LEVOTHYROXINE SODIUM 0.1 MG/1
TABLET ORAL
Qty: 90 TABLET | Refills: 2 | Status: SHIPPED | OUTPATIENT
Start: 2018-06-05 | End: 2019-04-07 | Stop reason: SDUPTHER

## 2018-09-12 ENCOUNTER — OFFICE VISIT (OUTPATIENT)
Dept: CARDIOLOGY | Facility: CLINIC | Age: 63
End: 2018-09-12

## 2018-09-12 VITALS
BODY MASS INDEX: 28.17 KG/M2 | WEIGHT: 159 LBS | OXYGEN SATURATION: 98 % | DIASTOLIC BLOOD PRESSURE: 94 MMHG | HEART RATE: 69 BPM | SYSTOLIC BLOOD PRESSURE: 148 MMHG | TEMPERATURE: 98.2 F | HEIGHT: 63 IN

## 2018-09-12 DIAGNOSIS — G89.4 CHRONIC PAIN SYNDROME: ICD-10-CM

## 2018-09-12 DIAGNOSIS — E11.9 TYPE 2 DIABETES MELLITUS WITHOUT COMPLICATION, WITHOUT LONG-TERM CURRENT USE OF INSULIN (HCC): ICD-10-CM

## 2018-09-12 DIAGNOSIS — E03.9 HYPOTHYROIDISM, UNSPECIFIED TYPE: ICD-10-CM

## 2018-09-12 DIAGNOSIS — E78.2 MIXED HYPERLIPIDEMIA: ICD-10-CM

## 2018-09-12 DIAGNOSIS — E53.8 B12 DEFICIENCY: ICD-10-CM

## 2018-09-12 DIAGNOSIS — E55.9 VITAMIN D DEFICIENCY: ICD-10-CM

## 2018-09-12 DIAGNOSIS — I10 ESSENTIAL HYPERTENSION: Primary | ICD-10-CM

## 2018-09-12 PROCEDURE — 99214 OFFICE O/P EST MOD 30 MIN: CPT | Performed by: INTERNAL MEDICINE

## 2018-09-12 RX ORDER — LOSARTAN POTASSIUM 25 MG/1
25 TABLET ORAL DAILY
Qty: 90 TABLET | Refills: 0 | Status: SHIPPED | OUTPATIENT
Start: 2018-09-12 | End: 2018-12-11 | Stop reason: SDUPTHER

## 2018-09-12 NOTE — PROGRESS NOTES
subjective     Chief Complaint   Patient presents with   • Hypertension   • Hyperlipidemia   • Earache     rt side shooting pain, no fever, states get staggery at times, thinks its from her ers     History of Present Illness  Patient is 63 years old white female who was multiple chronic medical problems.  Patient has essential hypertension.  She is taking Lopressor 25 twice a day.  Blood pressure is elevated.    Patient also has a hyperlipidemia patient has not had lab work in a while.  She is taking Lipitor 10 mg regularly without any drug side effects.    Patient has type 2 diabetes mellitus.  She is trying to diet and is taking metformin and Tradjenta.  Lab work will be checked next visit.  She is trying to follow diet.    Other problem is of hypothyroidism.  She is taking Synthroid 100 µg daily.  Will check thyroid functions next visit.    Patient also states that she has been having some discomfort in the right ear and gets dizzy and has mild vertigo.    Past Surgical History:   Procedure Laterality Date   • CHOLECYSTECTOMY     • COLONOSCOPY W/ BIOPSIES  2012   • HYSTERECTOMY     • VARICOSE VEIN SURGERY Right      Family History   Problem Relation Age of Onset   • Thyroid cancer Mother    • Aneurysm Mother    • Hypertension Mother    • Diabetes Mother    • Arthritis Mother    • Heart attack Father    • Heart disease Father    • Diabetes Brother    • Kidney failure Brother    • Hypertension Brother    • Diabetes Brother    • Hypertension Brother    • Breast cancer Maternal Aunt      Past Medical History:   Diagnosis Date   • B12 deficiency    • Bronchitis, acute    • Chronic pain syndrome    • COPD (chronic obstructive pulmonary disease) (CMS/HCC)    • Diabetes mellitus (CMS/HCC)    • Disease of thyroid gland    • Hyperlipidemia    • Hypertension      Patient Active Problem List   Diagnosis   • Acute bronchitis   • B12 deficiency*   • Chronic pain syndrome*   • Hypertension*   • Hyperlipidemia*   • Diabetes  mellitus*   • Hypothyroidism*   • Vitamin D deficiency*   • Right ear pain       Social History   Substance Use Topics   • Smoking status: Current Every Day Smoker     Packs/day: 5.00     Years: 10.00     Types: Cigarettes   • Smokeless tobacco: Never Used   • Alcohol use No       Allergies   Allergen Reactions   • Avelox [Moxifloxacin]        Current Outpatient Prescriptions on File Prior to Visit   Medication Sig   • albuterol (PROVENTIL HFA;VENTOLIN HFA) 108 (90 Base) MCG/ACT inhaler Inhale 2 puffs Every 4 (Four) Hours As Needed for Wheezing.   • atorvastatin (LIPITOR) 10 MG tablet Take 1 tablet by mouth Daily.   • furosemide (LASIX) 20 MG tablet Take 1 tablet by mouth Daily.   • glucose blood test strip 1 each by Other route Daily. Use as instructed   • hydroxychloroquine (PLAQUENIL) 200 MG tablet Take 200 mg by mouth daily.   • levothyroxine (SYNTHROID, LEVOTHROID) 100 MCG tablet Take 1 tablet by mouth Daily.   • levothyroxine (SYNTHROID, LEVOTHROID) 100 MCG tablet TAKE ONE TABLET BY MOUTH EVERY DAY FOR THYROID   • linagliptin (TRADJENTA) 5 MG tablet tablet Take 1 tablet by mouth Daily.   • metFORMIN (GLUCOPHAGE) 1000 MG tablet Take 1 tablet by mouth 2 (Two) Times a Day With Meals.   • metoprolol tartrate (LOPRESSOR) 25 MG tablet Take 1 tablet by mouth Every 12 (Twelve) Hours.   • montelukast (SINGULAIR) 10 MG tablet Take 1 tablet by mouth Every Night.   • ONE TOUCH LANCETS [facility] [place] USES TWICE A DAY TO CHECK BLOOD SUGAR   • vitamin B-12 (CYANOCOBALAMIN) 500 MCG tablet Take 1,000 mcg by mouth daily.   • HYDROcodone-acetaminophen (NORCO) 5-325 MG per tablet Take 1 tablet by mouth Every 8 (Eight) Hours As Needed for Moderate Pain .   • [DISCONTINUED] azithromycin (ZITHROMAX) 250 MG tablet Take 2 tablets the first day, then 1 tablet daily for 4 days.   • [DISCONTINUED] Fluticasone Furoate-Vilanterol 100-25 MCG/INH aerosol powder  Inhale 1 puff Daily.     No current facility-administered medications on file prior to  "visit.          The following portions of the patient's history were reviewed and updated as appropriate: allergies, current medications, past family history, past medical history, past social history, past surgical history and problem list.    Review of Systems   Constitution: Negative.   HENT: Positive for congestion.    Eyes: Negative.    Cardiovascular: Negative.  Negative for chest pain, cyanosis, dyspnea on exertion, irregular heartbeat, leg swelling, near-syncope, orthopnea, palpitations, paroxysmal nocturnal dyspnea and syncope.   Respiratory: Negative.  Negative for shortness of breath.    Endocrine: Negative.    Hematologic/Lymphatic: Negative.    Musculoskeletal: Positive for back pain and myalgias.   Gastrointestinal: Negative.    Genitourinary: Negative.    Neurological: Positive for dizziness and vertigo. Negative for headaches.   Psychiatric/Behavioral: Negative.    Allergic/Immunologic: Negative.           Objective:     /94 (BP Location: Left arm, Patient Position: Sitting, Cuff Size: Adult)   Pulse 69   Temp 98.2 °F (36.8 °C)   Ht 160 cm (63\")   Wt 72.1 kg (159 lb)   SpO2 98%   BMI 28.17 kg/m²   Physical Exam   Constitutional: She appears well-developed and well-nourished. No distress.   HENT:   Head: Normocephalic and atraumatic.   Mouth/Throat: Oropharynx is clear and moist. No oropharyngeal exudate.   Eyes: Pupils are equal, round, and reactive to light. Conjunctivae and EOM are normal. No scleral icterus.   Neck: Normal range of motion. Neck supple. No JVD present. No tracheal deviation present. No thyromegaly present.   Cardiovascular: Normal rate, regular rhythm, normal heart sounds and intact distal pulses.  PMI is not displaced.  Exam reveals no gallop, no friction rub and no decreased pulses.    No murmur heard.  Pulses:       Carotid pulses are 3+ on the right side, and 3+ on the left side.       Radial pulses are 3+ on the right side, and 3+ on the left side. "   Pulmonary/Chest: Effort normal and breath sounds normal. No respiratory distress. She has no wheezes. She has no rales. She exhibits no tenderness.   Abdominal: Soft. Bowel sounds are normal. She exhibits no distension, no abdominal bruit and no mass. There is no splenomegaly or hepatomegaly. There is no tenderness. There is no rebound and no guarding.   Musculoskeletal: Normal range of motion. She exhibits no edema, tenderness or deformity.   Lymphadenopathy:     She has no cervical adenopathy.   Neurological: She is alert. She has normal reflexes. No cranial nerve deficit. She exhibits normal muscle tone. Coordination normal.   Skin: Skin is warm and dry. No rash noted. She is not diaphoretic. No erythema.   Psychiatric: She has a normal mood and affect. Her behavior is normal. Judgment and thought content normal.         Lab Review  Lab Results   Component Value Date     02/21/2018    K 4.1 02/21/2018     02/21/2018    BUN 12 02/21/2018    CREATININE 0.67 02/21/2018    GLUCOSE 115 (H) 02/21/2018    CALCIUM 9.0 02/21/2018    ALT 8 (L) 02/21/2018    ALKPHOS 67 02/21/2018    LABIL2 1.5 04/28/2016     Lab Results   Component Value Date    CKTOTAL 29 02/21/2018     Lab Results   Component Value Date    WBC 10.81 02/21/2018    HGB 13.7 02/21/2018    HCT 42.2 02/21/2018     02/21/2018     No results found for: INR  No results found for: MG  Lab Results   Component Value Date    TSH 1.391 02/21/2018     No results found for: BNP  Lab Results   Component Value Date    CHLPL 184 04/28/2016    CHOL 183 02/21/2018    TRIG 147 02/21/2018    HDL 44 (L) 02/21/2018    VLDL 29.4 02/21/2018    LDLHDL 2.49 02/21/2018     Lab Results   Component Value Date     (H) 02/21/2018     09/08/2017       Procedures       I personally viewed and interpreted the patient's LAB data         Assessment:     1. Essential hypertension    2. Mixed hyperlipidemia    3. Chronic pain syndrome*    4. Type 2 diabetes  mellitus without complication, without long-term current use of insulin (CMS/[facility])    5. Hypothyroidism, unspecified type    6. Vitamin D deficiency*    7. B12 deficiency*          Plan:      Blood pressure is elevated.  Patient was advised to take losartan 25 mg daily.  She will continue Lopressor 25 twice a day.    Patient has hyperlipidemia.  Last LDL was elevated at 110.  She is taking Lipitor.  Lab work was scheduled for next visit and healthy lifestyle was emphasized.  Patient was advised to continue Synthroid 100 µg daily.  We will check free T4 and TSH next visit.    Patient also will continue Tradjenta) Glucophage.  Hemoglobin A1c and urine for microalbumin was scheduled.    She also will continue vitamin B-12 and vitamin D.  Patient has multiple environmental allergies.  She is taking Proventil HFA fluticasone and Singulair that was continued.  Patient's vertigo is probably due to ear congestion.  Ear examination was normal.  Follow-up scheduled with lab work        No Follow-up on file.

## 2018-10-19 ENCOUNTER — LAB (OUTPATIENT)
Dept: LAB | Facility: HOSPITAL | Age: 63
End: 2018-10-19
Attending: INTERNAL MEDICINE

## 2018-10-19 ENCOUNTER — HOSPITAL ENCOUNTER (OUTPATIENT)
Dept: GENERAL RADIOLOGY | Facility: HOSPITAL | Age: 63
Discharge: HOME | End: 2018-10-19
Attending: INTERNAL MEDICINE | Admitting: INTERNAL MEDICINE

## 2018-10-19 ENCOUNTER — OFFICE VISIT (OUTPATIENT)
Dept: CARDIOLOGY | Facility: CLINIC | Age: 63
End: 2018-10-19

## 2018-10-19 VITALS
WEIGHT: 157 LBS | SYSTOLIC BLOOD PRESSURE: 128 MMHG | BODY MASS INDEX: 27.82 KG/M2 | HEIGHT: 63 IN | HEART RATE: 81 BPM | OXYGEN SATURATION: 96 % | DIASTOLIC BLOOD PRESSURE: 80 MMHG

## 2018-10-19 DIAGNOSIS — J20.9 ACUTE BRONCHITIS, UNSPECIFIED ORGANISM: ICD-10-CM

## 2018-10-19 DIAGNOSIS — J20.9 ACUTE BRONCHITIS, UNSPECIFIED ORGANISM: Primary | ICD-10-CM

## 2018-10-19 DIAGNOSIS — E03.8 OTHER SPECIFIED HYPOTHYROIDISM: ICD-10-CM

## 2018-10-19 DIAGNOSIS — I10 ESSENTIAL HYPERTENSION: ICD-10-CM

## 2018-10-19 DIAGNOSIS — E78.2 MIXED HYPERLIPIDEMIA: ICD-10-CM

## 2018-10-19 DIAGNOSIS — E03.9 HYPOTHYROIDISM, UNSPECIFIED TYPE: ICD-10-CM

## 2018-10-19 DIAGNOSIS — J43.8 OTHER EMPHYSEMA (HCC): ICD-10-CM

## 2018-10-19 DIAGNOSIS — E11.9 TYPE 2 DIABETES MELLITUS WITHOUT COMPLICATION, WITHOUT LONG-TERM CURRENT USE OF INSULIN (HCC): ICD-10-CM

## 2018-10-19 LAB
ALBUMIN SERPL-MCNC: 4.7 G/DL (ref 3.4–4.8)
ALBUMIN UR-MCNC: 97.3 MG/L
ALBUMIN/GLOB SERPL: 1.5 G/DL (ref 1.5–2.5)
ALP SERPL-CCNC: 78 U/L (ref 35–104)
ALT SERPL W P-5'-P-CCNC: 17 U/L (ref 10–36)
ANION GAP SERPL CALCULATED.3IONS-SCNC: 7.6 MMOL/L (ref 3.6–11.2)
AST SERPL-CCNC: 15 U/L (ref 10–30)
BASOPHILS # BLD AUTO: 0.04 10*3/MM3 (ref 0–0.3)
BASOPHILS NFR BLD AUTO: 0.3 % (ref 0–2)
BILIRUB SERPL-MCNC: 0.5 MG/DL (ref 0.2–1.8)
BUN BLD-MCNC: 11 MG/DL (ref 7–21)
BUN/CREAT SERPL: 14.9 (ref 7–25)
CALCIUM SPEC-SCNC: 9.3 MG/DL (ref 7.7–10)
CHLORIDE SERPL-SCNC: 104 MMOL/L (ref 99–112)
CHOLEST SERPL-MCNC: 195 MG/DL (ref 0–200)
CK SERPL-CCNC: 34 U/L (ref 24–173)
CO2 SERPL-SCNC: 28.4 MMOL/L (ref 24.3–31.9)
CREAT BLD-MCNC: 0.74 MG/DL (ref 0.43–1.29)
DEPRECATED RDW RBC AUTO: 44.2 FL (ref 37–54)
EOSINOPHIL # BLD AUTO: 0.18 10*3/MM3 (ref 0–0.7)
EOSINOPHIL NFR BLD AUTO: 1.5 % (ref 0–5)
ERYTHROCYTE [DISTWIDTH] IN BLOOD BY AUTOMATED COUNT: 13.3 % (ref 11.5–14.5)
FLUAV AG NPH QL: NEGATIVE
FLUBV AG NPH QL IA: NEGATIVE
GFR SERPL CREATININE-BSD FRML MDRD: 79 ML/MIN/1.73
GLOBULIN UR ELPH-MCNC: 3.1 GM/DL
GLUCOSE BLD-MCNC: 111 MG/DL (ref 70–110)
HBA1C MFR BLD: 6 % (ref 4.5–5.7)
HCT VFR BLD AUTO: 44.5 % (ref 37–47)
HDLC SERPL-MCNC: 49 MG/DL (ref 60–100)
HGB BLD-MCNC: 14.4 G/DL (ref 12–16)
IMM GRANULOCYTES # BLD: 0.03 10*3/MM3 (ref 0–0.03)
IMM GRANULOCYTES NFR BLD: 0.3 % (ref 0–0.5)
LDLC SERPL CALC-MCNC: 112 MG/DL (ref 0–100)
LDLC/HDLC SERPL: 2.29 {RATIO}
LYMPHOCYTES # BLD AUTO: 1.94 10*3/MM3 (ref 1–3)
LYMPHOCYTES NFR BLD AUTO: 16.4 % (ref 21–51)
MCH RBC QN AUTO: 30.7 PG (ref 27–33)
MCHC RBC AUTO-ENTMCNC: 32.4 G/DL (ref 33–37)
MCV RBC AUTO: 94.9 FL (ref 80–94)
MONOCYTES # BLD AUTO: 0.65 10*3/MM3 (ref 0.1–0.9)
MONOCYTES NFR BLD AUTO: 5.5 % (ref 0–10)
NEUTROPHILS # BLD AUTO: 9.01 10*3/MM3 (ref 1.4–6.5)
NEUTROPHILS NFR BLD AUTO: 76 % (ref 30–70)
OSMOLALITY SERPL CALC.SUM OF ELEC: 279.5 MOSM/KG (ref 273–305)
PLATELET # BLD AUTO: 370 10*3/MM3 (ref 130–400)
PMV BLD AUTO: 11 FL (ref 6–10)
POTASSIUM BLD-SCNC: 4 MMOL/L (ref 3.5–5.3)
PROT SERPL-MCNC: 7.8 G/DL (ref 6–8)
RBC # BLD AUTO: 4.69 10*6/MM3 (ref 4.2–5.4)
SODIUM BLD-SCNC: 140 MMOL/L (ref 135–153)
T4 FREE SERPL-MCNC: 1.8 NG/DL (ref 0.89–1.76)
TRIGL SERPL-MCNC: 170 MG/DL (ref 0–150)
TSH SERPL DL<=0.05 MIU/L-ACNC: 0.85 MIU/ML (ref 0.55–4.78)
VLDLC SERPL-MCNC: 34 MG/DL
WBC NRBC COR # BLD: 11.85 10*3/MM3 (ref 4.5–12.5)

## 2018-10-19 PROCEDURE — 36415 COLL VENOUS BLD VENIPUNCTURE: CPT

## 2018-10-19 PROCEDURE — 71046 X-RAY EXAM CHEST 2 VIEWS: CPT | Performed by: RADIOLOGY

## 2018-10-19 PROCEDURE — 87804 INFLUENZA ASSAY W/OPTIC: CPT

## 2018-10-19 PROCEDURE — 82043 UR ALBUMIN QUANTITATIVE: CPT

## 2018-10-19 PROCEDURE — 80053 COMPREHEN METABOLIC PANEL: CPT

## 2018-10-19 PROCEDURE — 99214 OFFICE O/P EST MOD 30 MIN: CPT | Performed by: INTERNAL MEDICINE

## 2018-10-19 PROCEDURE — 83036 HEMOGLOBIN GLYCOSYLATED A1C: CPT

## 2018-10-19 PROCEDURE — 80061 LIPID PANEL: CPT

## 2018-10-19 PROCEDURE — 82550 ASSAY OF CK (CPK): CPT

## 2018-10-19 PROCEDURE — 84443 ASSAY THYROID STIM HORMONE: CPT

## 2018-10-19 PROCEDURE — 84439 ASSAY OF FREE THYROXINE: CPT

## 2018-10-19 PROCEDURE — 85025 COMPLETE CBC W/AUTO DIFF WBC: CPT

## 2018-10-19 PROCEDURE — 71046 X-RAY EXAM CHEST 2 VIEWS: CPT

## 2018-10-19 PROCEDURE — 96372 THER/PROPH/DIAG INJ SC/IM: CPT | Performed by: INTERNAL MEDICINE

## 2018-10-19 RX ORDER — CEFTRIAXONE 500 MG/1
500 INJECTION, POWDER, FOR SOLUTION INTRAMUSCULAR; INTRAVENOUS ONCE
Status: COMPLETED | OUTPATIENT
Start: 2018-10-19 | End: 2018-10-19

## 2018-10-19 RX ORDER — CEFDINIR 300 MG/1
300 CAPSULE ORAL 2 TIMES DAILY
Qty: 14 CAPSULE | Refills: 0 | Status: SHIPPED | OUTPATIENT
Start: 2018-10-19 | End: 2018-11-13

## 2018-10-19 RX ADMIN — CEFTRIAXONE 500 MG: 500 INJECTION, POWDER, FOR SOLUTION INTRAMUSCULAR; INTRAVENOUS at 11:57

## 2018-10-21 NOTE — PROGRESS NOTES
subjective     Chief Complaint   Patient presents with   • Cough   • URI   • Diarrhea     History of Present Illness  Patient is 63 years old white female who complains of head cold chest congestion and a lot of cough.  She denies any fever today but has been having some fever and taking Advil.  Been going on for last 4 days.  She also has developed some diarrhea.    Or other problems consist of hypertension which is very well controlled.  She also has hyperlipidemia.  She is tolerating medications very well.  She is taking Lipitor.  Patient has hypothyroidism on Synthroid replacement therapy and is diabetic on Glucophage and Tradjenta.    Past Surgical History:   Procedure Laterality Date   • CHOLECYSTECTOMY     • COLONOSCOPY W/ BIOPSIES  2012   • HYSTERECTOMY     • VARICOSE VEIN SURGERY Right      Family History   Problem Relation Age of Onset   • Thyroid cancer Mother    • Aneurysm Mother    • Hypertension Mother    • Diabetes Mother    • Arthritis Mother    • Heart attack Father    • Heart disease Father    • Diabetes Brother    • Kidney failure Brother    • Hypertension Brother    • Diabetes Brother    • Hypertension Brother    • Breast cancer Maternal Aunt      Past Medical History:   Diagnosis Date   • B12 deficiency    • Bronchitis, acute    • Chronic pain syndrome    • COPD (chronic obstructive pulmonary disease) (CMS/HCC)    • Diabetes mellitus (CMS/HCC)    • Disease of thyroid gland    • Hyperlipidemia    • Hypertension      Patient Active Problem List   Diagnosis   • Acute bronchitis   • B12 deficiency*   • Chronic pain syndrome*   • Hypertension*   • Hyperlipidemia*   • Diabetes mellitus*   • Hypothyroidism*   • Vitamin D deficiency*   • Right ear pain       Social History   Substance Use Topics   • Smoking status: Current Every Day Smoker     Packs/day: 5.00     Years: 10.00     Types: Cigarettes   • Smokeless tobacco: Never Used   • Alcohol use No       Allergies   Allergen Reactions   • Avelox  [Moxifloxacin]        Current Outpatient Prescriptions on File Prior to Visit   Medication Sig   • albuterol (PROVENTIL HFA;VENTOLIN HFA) 108 (90 Base) MCG/ACT inhaler Inhale 2 puffs Every 4 (Four) Hours As Needed for Wheezing.   • atorvastatin (LIPITOR) 10 MG tablet Take 1 tablet by mouth Daily.   • Fluticasone Furoate-Vilanterol (BREO ELLIPTA) 100-25 MCG/INH aerosol powder  Inhale.   • furosemide (LASIX) 20 MG tablet Take 1 tablet by mouth Daily.   • glucose blood test strip 1 each by Other route Daily. Use as instructed   • HYDROcodone-acetaminophen (NORCO) 5-325 MG per tablet Take 1 tablet by mouth Every 8 (Eight) Hours As Needed for Moderate Pain .   • hydroxychloroquine (PLAQUENIL) 200 MG tablet Take 200 mg by mouth daily.   • levothyroxine (SYNTHROID, LEVOTHROID) 100 MCG tablet Take 1 tablet by mouth Daily.   • levothyroxine (SYNTHROID, LEVOTHROID) 100 MCG tablet TAKE ONE TABLET BY MOUTH EVERY DAY FOR THYROID   • linagliptin (TRADJENTA) 5 MG tablet tablet Take 1 tablet by mouth Daily.   • losartan (COZAAR) 25 MG tablet Take 1 tablet by mouth Daily.   • metFORMIN (GLUCOPHAGE) 1000 MG tablet Take 1 tablet by mouth 2 (Two) Times a Day With Meals.   • montelukast (SINGULAIR) 10 MG tablet Take 1 tablet by mouth Every Night.   • ONE TOUCH LANCETS [facility] [place] [facility] USES TWICE A DAY TO CHECK BLOOD SUGAR   • vitamin B-12 (CYANOCOBALAMIN) 500 MCG tablet Take 1,000 mcg by mouth daily.     No current facility-administered medications on file prior to visit.          The following portions of the patient's history were reviewed and updated as appropriate: allergies, current medications, past family history, past medical history, past social history, past surgical history and problem list.    Review of Systems   Constitution: Negative.   HENT: Positive for congestion and hoarse voice.    Eyes: Negative.    Cardiovascular: Negative.  Negative for chest pain, cyanosis, dyspnea on exertion, irregular heartbeat, leg swelling, near-syncope,  "orthopnea, palpitations, paroxysmal nocturnal dyspnea and syncope.   Respiratory: Positive for cough, shortness of breath and sputum production.    Hematologic/Lymphatic: Negative.    Musculoskeletal: Negative.    Gastrointestinal: Negative.    Neurological: Negative.  Negative for headaches.          Objective:     /80 (BP Location: Left arm, Patient Position: Sitting)   Pulse 81   Ht 160 cm (63\")   Wt 71.2 kg (157 lb)   SpO2 96%   BMI 27.81 kg/m²   Physical Exam   Constitutional: She appears well-developed and well-nourished. No distress.   HENT:   Head: Normocephalic and atraumatic.   Mouth/Throat: Oropharynx is clear and moist. No oropharyngeal exudate.   Eyes: Pupils are equal, round, and reactive to light. Conjunctivae and EOM are normal. No scleral icterus.   Neck: Normal range of motion. Neck supple. No JVD present. No tracheal deviation present. No thyromegaly present.   Cardiovascular: Normal rate, regular rhythm, normal heart sounds and intact distal pulses.  PMI is not displaced.  Exam reveals no gallop, no friction rub and no decreased pulses.    No murmur heard.  Pulses:       Carotid pulses are 3+ on the right side, and 3+ on the left side.       Radial pulses are 3+ on the right side, and 3+ on the left side.   Pulmonary/Chest: Effort normal and breath sounds normal. No respiratory distress. She has no wheezes. She has no rales. She exhibits no tenderness.   Abdominal: Soft. Bowel sounds are normal. She exhibits no distension, no abdominal bruit and no mass. There is no splenomegaly or hepatomegaly. There is no tenderness. There is no rebound and no guarding.   Musculoskeletal: Normal range of motion. She exhibits no edema, tenderness or deformity.   Lymphadenopathy:     She has no cervical adenopathy.   Neurological: She is alert. She has normal reflexes. No cranial nerve deficit. She exhibits normal muscle tone. Coordination normal.   Skin: Skin is warm and dry. No rash noted. She is not " diaphoretic. No erythema.   Psychiatric: She has a normal mood and affect. Her behavior is normal. Judgment and thought content normal.         Lab Review  Lab Results   Component Value Date     10/19/2018    K 4.0 10/19/2018     10/19/2018    BUN 11 10/19/2018    CREATININE 0.74 10/19/2018    GLUCOSE 111 (H) 10/19/2018    CALCIUM 9.3 10/19/2018    ALT 17 10/19/2018    ALKPHOS 78 10/19/2018    LABIL2 1.5 04/28/2016     Lab Results   Component Value Date    CKTOTAL 34 10/19/2018     Lab Results   Component Value Date    WBC 11.85 10/19/2018    HGB 14.4 10/19/2018    HCT 44.5 10/19/2018     10/19/2018     No results found for: INR  No results found for: MG  Lab Results   Component Value Date    TSH 0.847 10/19/2018     No results found for: BNP  Lab Results   Component Value Date    CHLPL 184 04/28/2016    CHOL 195 10/19/2018    TRIG 170 (H) 10/19/2018    HDL 49 (L) 10/19/2018    VLDL 34 10/19/2018    LDLHDL 2.29 10/19/2018     Lab Results   Component Value Date     (H) 10/19/2018     (H) 02/21/2018       Procedures       I personally viewed and interpreted the patient's LAB data         Assessment:     1. Acute bronchitis, unspecified organism    2. Essential hypertension    3. Other emphysema (CMS/HCC)    4. Other specified hypothyroidism          Plan:      Patient presents with sinusitis and bronchitis.  Lab work was done.  White count is normal.  Blood sugar is normal  Lipids are abnormal with LDL of 112.  Chest x-ray was done which is normal.    Patient was given injection Rocephin, cough medications and Omnicef.  She was checked for flu, flu antigen a and B are negative  Labs also reviewed .  Refills of medications were given      Follow-up scheduled        No Follow-up on file.

## 2018-11-06 ENCOUNTER — TELEPHONE (OUTPATIENT)
Dept: CARDIOLOGY | Facility: CLINIC | Age: 63
End: 2018-11-06

## 2018-11-06 NOTE — TELEPHONE ENCOUNTER
"Khadra from [facility] called stating on 10-18-18 they have a RX that was called into the pharmacy the female caller stated her name was \"Keely\" and she was 's nurse. [facility] the pharmacy did not fill this medication due to the fact it is a narcotic. I verified with , Karlie Woo MA, and the  Loren Figueroa that they didn't not contact [facility] on 10-18-18 about this RX. They denied any acknowledgment of the patient or the RX. Advised the pharmacy to void the RX they voiced understanding. Made management aware of the situation.   "

## 2018-11-13 ENCOUNTER — TELEPHONE (OUTPATIENT)
Dept: CARDIOLOGY | Facility: CLINIC | Age: 63
End: 2018-11-13

## 2018-11-13 DIAGNOSIS — R06.89 BREATHING DIFFICULTY: Primary | ICD-10-CM

## 2018-11-13 RX ORDER — AMOXICILLIN AND CLAVULANATE POTASSIUM 875; 125 MG/1; MG/1
1 TABLET, FILM COATED ORAL EVERY 12 HOURS SCHEDULED
Qty: 14 TABLET | Refills: 0 | Status: SHIPPED | OUTPATIENT
Start: 2018-11-13 | End: 2018-12-11 | Stop reason: ALTCHOICE

## 2018-11-13 RX ORDER — BENZONATATE 100 MG/1
100 CAPSULE ORAL 3 TIMES DAILY PRN
Qty: 30 CAPSULE | Refills: 0 | Status: SHIPPED | OUTPATIENT
Start: 2018-11-13 | End: 2018-12-11 | Stop reason: ALTCHOICE

## 2018-12-06 ENCOUNTER — LAB (OUTPATIENT)
Dept: LAB | Facility: HOSPITAL | Age: 63
End: 2018-12-06
Attending: INTERNAL MEDICINE

## 2018-12-06 DIAGNOSIS — E03.9 HYPOTHYROIDISM, UNSPECIFIED TYPE: ICD-10-CM

## 2018-12-06 DIAGNOSIS — E10.9 TYPE 1 DIABETES MELLITUS WITHOUT COMPLICATION (HCC): ICD-10-CM

## 2018-12-06 DIAGNOSIS — I10 ESSENTIAL HYPERTENSION: ICD-10-CM

## 2018-12-06 DIAGNOSIS — E78.5 HYPERLIPIDEMIA, UNSPECIFIED HYPERLIPIDEMIA TYPE: ICD-10-CM

## 2018-12-06 DIAGNOSIS — E03.9 HYPOTHYROIDISM, UNSPECIFIED TYPE: Primary | ICD-10-CM

## 2018-12-06 LAB
ALBUMIN SERPL-MCNC: 4.2 G/DL (ref 3.4–4.8)
ALBUMIN UR-MCNC: 86.7 MG/L
ALBUMIN/GLOB SERPL: 1.6 G/DL (ref 1.5–2.5)
ALP SERPL-CCNC: 65 U/L (ref 35–104)
ALT SERPL W P-5'-P-CCNC: 12 U/L (ref 10–36)
ANION GAP SERPL CALCULATED.3IONS-SCNC: 6.3 MMOL/L (ref 3.6–11.2)
AST SERPL-CCNC: 10 U/L (ref 10–30)
BASOPHILS # BLD AUTO: 0.07 10*3/MM3 (ref 0–0.3)
BASOPHILS NFR BLD AUTO: 0.8 % (ref 0–2)
BILIRUB SERPL-MCNC: 0.5 MG/DL (ref 0.2–1.8)
BUN BLD-MCNC: 14 MG/DL (ref 7–21)
BUN/CREAT SERPL: 16.7 (ref 7–25)
CALCIUM SPEC-SCNC: 8.9 MG/DL (ref 7.7–10)
CHLORIDE SERPL-SCNC: 106 MMOL/L (ref 99–112)
CHOLEST SERPL-MCNC: 176 MG/DL (ref 0–200)
CK SERPL-CCNC: 45 U/L (ref 24–173)
CO2 SERPL-SCNC: 26.7 MMOL/L (ref 24.3–31.9)
CREAT BLD-MCNC: 0.84 MG/DL (ref 0.43–1.29)
DEPRECATED RDW RBC AUTO: 47.9 FL (ref 37–54)
EOSINOPHIL # BLD AUTO: 0.23 10*3/MM3 (ref 0–0.7)
EOSINOPHIL NFR BLD AUTO: 2.5 % (ref 0–5)
ERYTHROCYTE [DISTWIDTH] IN BLOOD BY AUTOMATED COUNT: 14.2 % (ref 11.5–14.5)
GFR SERPL CREATININE-BSD FRML MDRD: 68 ML/MIN/1.73
GLOBULIN UR ELPH-MCNC: 2.7 GM/DL
GLUCOSE BLD-MCNC: 116 MG/DL (ref 70–110)
HBA1C MFR BLD: 5.8 % (ref 4.5–5.7)
HCT VFR BLD AUTO: 42.8 % (ref 37–47)
HDLC SERPL-MCNC: 51 MG/DL (ref 60–100)
HGB BLD-MCNC: 13.9 G/DL (ref 12–16)
IMM GRANULOCYTES # BLD: 0.02 10*3/MM3 (ref 0–0.03)
IMM GRANULOCYTES NFR BLD: 0.2 % (ref 0–0.5)
LDLC SERPL CALC-MCNC: 97 MG/DL (ref 0–100)
LDLC/HDLC SERPL: 1.9 {RATIO}
LYMPHOCYTES # BLD AUTO: 1.74 10*3/MM3 (ref 1–3)
LYMPHOCYTES NFR BLD AUTO: 18.8 % (ref 21–51)
MCH RBC QN AUTO: 31.2 PG (ref 27–33)
MCHC RBC AUTO-ENTMCNC: 32.5 G/DL (ref 33–37)
MCV RBC AUTO: 96.2 FL (ref 80–94)
MONOCYTES # BLD AUTO: 0.6 10*3/MM3 (ref 0.1–0.9)
MONOCYTES NFR BLD AUTO: 6.5 % (ref 0–10)
NEUTROPHILS # BLD AUTO: 6.61 10*3/MM3 (ref 1.4–6.5)
NEUTROPHILS NFR BLD AUTO: 71.2 % (ref 30–70)
OSMOLALITY SERPL CALC.SUM OF ELEC: 279 MOSM/KG (ref 273–305)
PLATELET # BLD AUTO: 315 10*3/MM3 (ref 130–400)
PMV BLD AUTO: 10.5 FL (ref 6–10)
POTASSIUM BLD-SCNC: 4.6 MMOL/L (ref 3.5–5.3)
PROT SERPL-MCNC: 6.9 G/DL (ref 6–8)
RBC # BLD AUTO: 4.45 10*6/MM3 (ref 4.2–5.4)
SODIUM BLD-SCNC: 139 MMOL/L (ref 135–153)
T4 FREE SERPL-MCNC: 1.63 NG/DL (ref 0.89–1.76)
TRIGL SERPL-MCNC: 141 MG/DL (ref 0–150)
TSH SERPL DL<=0.05 MIU/L-ACNC: 1.32 MIU/ML (ref 0.55–4.78)
VLDLC SERPL-MCNC: 28.2 MG/DL
WBC NRBC COR # BLD: 9.27 10*3/MM3 (ref 4.5–12.5)

## 2018-12-06 PROCEDURE — 84439 ASSAY OF FREE THYROXINE: CPT

## 2018-12-06 PROCEDURE — 84443 ASSAY THYROID STIM HORMONE: CPT

## 2018-12-06 PROCEDURE — 83036 HEMOGLOBIN GLYCOSYLATED A1C: CPT

## 2018-12-06 PROCEDURE — 82043 UR ALBUMIN QUANTITATIVE: CPT

## 2018-12-06 PROCEDURE — 82550 ASSAY OF CK (CPK): CPT

## 2018-12-06 PROCEDURE — 36415 COLL VENOUS BLD VENIPUNCTURE: CPT

## 2018-12-06 PROCEDURE — 85025 COMPLETE CBC W/AUTO DIFF WBC: CPT

## 2018-12-06 PROCEDURE — 80053 COMPREHEN METABOLIC PANEL: CPT

## 2018-12-06 PROCEDURE — 80061 LIPID PANEL: CPT

## 2018-12-11 ENCOUNTER — OFFICE VISIT (OUTPATIENT)
Dept: CARDIOLOGY | Facility: CLINIC | Age: 63
End: 2018-12-11

## 2018-12-11 VITALS
HEIGHT: 63 IN | OXYGEN SATURATION: 96 % | HEART RATE: 77 BPM | BODY MASS INDEX: 28.17 KG/M2 | WEIGHT: 159 LBS | SYSTOLIC BLOOD PRESSURE: 134 MMHG | DIASTOLIC BLOOD PRESSURE: 82 MMHG

## 2018-12-11 DIAGNOSIS — R22.41 MASS OF RIGHT KNEE: ICD-10-CM

## 2018-12-11 DIAGNOSIS — Z12.31 ENCOUNTER FOR SCREENING MAMMOGRAM FOR MALIGNANT NEOPLASM OF BREAST: Primary | ICD-10-CM

## 2018-12-11 DIAGNOSIS — E03.8 OTHER SPECIFIED HYPOTHYROIDISM: ICD-10-CM

## 2018-12-11 DIAGNOSIS — J43.8 OTHER EMPHYSEMA (HCC): ICD-10-CM

## 2018-12-11 DIAGNOSIS — I10 ESSENTIAL HYPERTENSION: ICD-10-CM

## 2018-12-11 DIAGNOSIS — Z12.11 COLON CANCER SCREENING: ICD-10-CM

## 2018-12-11 PROCEDURE — 99214 OFFICE O/P EST MOD 30 MIN: CPT | Performed by: INTERNAL MEDICINE

## 2018-12-11 RX ORDER — LOSARTAN POTASSIUM 25 MG/1
25 TABLET ORAL DAILY
Qty: 90 TABLET | Refills: 3 | Status: SHIPPED | OUTPATIENT
Start: 2018-12-11 | End: 2019-11-25 | Stop reason: SDUPTHER

## 2018-12-11 RX ORDER — MONTELUKAST SODIUM 10 MG/1
10 TABLET ORAL NIGHTLY
Qty: 90 TABLET | Refills: 3 | Status: SHIPPED | OUTPATIENT
Start: 2018-12-11 | End: 2020-01-06 | Stop reason: SDUPTHER

## 2018-12-11 NOTE — PROGRESS NOTES
subjective     Chief Complaint   Patient presents with   • Cyst     Under Right knee cap   • Hyperlipidemia   • Hypertension   • Follow-up   • Results     LABS     History of Present Illness  Patient is 63 years old white female who is here for follow-up.  Patient complains of a mass in the right popliteal fossa.  It is tender and causes her problem when she is flexing her knee.    Patient has essential hypertension.  She is taking her medications regularly.  Blood pressure is very well controlled with losartan and Lopressor.    Patient also has hyperlipidemia she is taking Lipitor 10 mg daily.  She is also trying to  follow proper diet.    Hypothyroidism is treated with Synthroid which she is taking 100 µg daily.  Thyroid functions are being monitored.    Patient also is diabetic and she is using Glucophage and Tradjenta    She has COPD with multiple episodes of recurrent bronchitis.  She was treated with antibiotics cough medications and believe ellipta she seems to be doing better.  She has appointment with Dr. ledesma in next couple of days.  She was also started on Singulair 10 mg daily because of environmental allergies.    She is taking Plaquenil from a different provider    Past Surgical History:   Procedure Laterality Date   • CHOLECYSTECTOMY     • COLONOSCOPY W/ BIOPSIES  2012   • HYSTERECTOMY     • VARICOSE VEIN SURGERY Right      Family History   Problem Relation Age of Onset   • Thyroid cancer Mother    • Aneurysm Mother    • Hypertension Mother    • Diabetes Mother    • Arthritis Mother    • Heart attack Father    • Heart disease Father    • Diabetes Brother    • Kidney failure Brother    • Hypertension Brother    • Diabetes Brother    • Hypertension Brother    • Breast cancer Maternal Aunt      Past Medical History:   Diagnosis Date   • B12 deficiency    • Bronchitis, acute    • Chronic pain syndrome    • COPD (chronic obstructive pulmonary disease) (CMS/HCC)    • Diabetes mellitus (CMS/HCC)    •  Disease of thyroid gland    • Hyperlipidemia    • Hypertension      Patient Active Problem List   Diagnosis   • Acute bronchitis   • B12 deficiency*   • Chronic pain syndrome*   • Hypertension*   • Hyperlipidemia*   • Diabetes mellitus*   • Hypothyroidism*   • Vitamin D deficiency*   • Right ear pain       Social History     Tobacco Use   • Smoking status: Current Every Day Smoker     Packs/day: 5.00     Years: 10.00     Pack years: 50.00     Types: Cigarettes   • Smokeless tobacco: Never Used   Substance Use Topics   • Alcohol use: No   • Drug use: No       Allergies   Allergen Reactions   • Avelox [Moxifloxacin]        Current Outpatient Medications on File Prior to Visit   Medication Sig   • albuterol (PROVENTIL HFA;VENTOLIN HFA) 108 (90 Base) MCG/ACT inhaler Inhale 2 puffs Every 4 (Four) Hours As Needed for Wheezing.   • atorvastatin (LIPITOR) 10 MG tablet Take 1 tablet by mouth Daily.   • Fluticasone Furoate-Vilanterol (BREO ELLIPTA) 100-25 MCG/INH aerosol powder  Inhale.   • furosemide (LASIX) 20 MG tablet Take 1 tablet by mouth Daily.   • glucose blood test strip 1 each by Other route Daily. Use as instructed   • hydroxychloroquine (PLAQUENIL) 200 MG tablet Take 200 mg by mouth daily.   • levothyroxine (SYNTHROID, LEVOTHROID) 100 MCG tablet TAKE ONE TABLET BY MOUTH EVERY DAY FOR THYROID   • metoprolol tartrate (LOPRESSOR) 25 MG tablet Take 1 tablet by mouth Every 12 (Twelve) Hours.   • ONE TOUCH LANCETS [facility] USES TWICE A DAY TO CHECK BLOOD SUGAR   • vitamin B-12 (CYANOCOBALAMIN) 500 MCG tablet Take 1,000 mcg by mouth daily.   • [DISCONTINUED] amoxicillin-clavulanate (AUGMENTIN) 875-125 MG per tablet Take 1 tablet by mouth Every 12 (Twelve) Hours.   • [DISCONTINUED] benzonatate (TESSALON PERLES) 100 MG capsule Take 1 capsule by mouth 3 (Three) Times a Day As Needed for Cough.   • [DISCONTINUED] HYDROcod Polst-CPM Polst ER (TUSSIONEX PENNKINETIC ER) 10-8 MG/5ML ER suspension Take 5 mL by mouth Every 12 (Twelve)  "Hours As Needed (cough).   • [DISCONTINUED] HYDROcodone-acetaminophen (NORCO) 5-325 MG per tablet Take 1 tablet by mouth Every 8 (Eight) Hours As Needed for Moderate Pain .   • [DISCONTINUED] levothyroxine (SYNTHROID, LEVOTHROID) 100 MCG tablet Take 1 tablet by mouth Daily.   • [DISCONTINUED] linagliptin (TRADJENTA) 5 MG tablet tablet Take 1 tablet by mouth Daily.   • [DISCONTINUED] losartan (COZAAR) 25 MG tablet Take 1 tablet by mouth Daily.   • [DISCONTINUED] metFORMIN (GLUCOPHAGE) 1000 MG tablet Take 1 tablet by mouth 2 (Two) Times a Day With Meals.   • [DISCONTINUED] montelukast (SINGULAIR) 10 MG tablet Take 1 tablet by mouth Every Night.     No current facility-administered medications on file prior to visit.          The following portions of the patient's history were reviewed and updated as appropriate: allergies, current medications, past family history, past medical history, past social history, past surgical history and problem list.    Review of Systems   Constitution: Negative.   HENT: Negative.  Negative for congestion.    Eyes: Negative.    Cardiovascular: Negative.  Negative for chest pain, cyanosis, dyspnea on exertion, irregular heartbeat, leg swelling, near-syncope, orthopnea, palpitations, paroxysmal nocturnal dyspnea and syncope.   Respiratory: Positive for cough. Negative for shortness of breath.    Hematologic/Lymphatic: Negative.    Musculoskeletal: Positive for arthritis.   Gastrointestinal: Negative.    Neurological: Negative.  Negative for headaches.   Allergic/Immunologic: Positive for environmental allergies.          Objective:     /82 (BP Location: Left arm, Patient Position: Sitting)   Pulse 77   Ht 160 cm (63\")   Wt 72.1 kg (159 lb)   SpO2 96%   BMI 28.17 kg/m²   Physical Exam   Constitutional: She appears well-developed and well-nourished. No distress.   HENT:   Head: Normocephalic and atraumatic.   Mouth/Throat: Oropharynx is clear and moist. No oropharyngeal exudate. "   Eyes: Conjunctivae and EOM are normal. Pupils are equal, round, and reactive to light. No scleral icterus.   Neck: Normal range of motion. Neck supple. No JVD present. No tracheal deviation present. No thyromegaly present.   Cardiovascular: Normal rate, regular rhythm, normal heart sounds and intact distal pulses. PMI is not displaced. Exam reveals no gallop, no friction rub and no decreased pulses.   No murmur heard.  Pulses:       Carotid pulses are 3+ on the right side, and 3+ on the left side.       Radial pulses are 3+ on the right side, and 3+ on the left side.   Pulmonary/Chest: Effort normal and breath sounds normal. No respiratory distress. She has no wheezes. She has no rales. She exhibits no tenderness.   Abdominal: Soft. Bowel sounds are normal. She exhibits no distension, no abdominal bruit and no mass. There is no splenomegaly or hepatomegaly. There is no tenderness. There is no rebound and no guarding.   Musculoskeletal: Normal range of motion. She exhibits no edema, tenderness or deformity.   Patient has a cyst in the right popliteal fossa   Lymphadenopathy:     She has no cervical adenopathy.   Neurological: She is alert. She has normal reflexes. No cranial nerve deficit. She exhibits normal muscle tone. Coordination normal.   Skin: Skin is warm and dry. No rash noted. She is not diaphoretic. No erythema.   Psychiatric: She has a normal mood and affect. Her behavior is normal. Judgment and thought content normal.         Lab Review  Lab Results   Component Value Date     12/06/2018    K 4.6 12/06/2018     12/06/2018    BUN 14 12/06/2018    CREATININE 0.84 12/06/2018    GLUCOSE 116 (H) 12/06/2018    CALCIUM 8.9 12/06/2018    ALT 12 12/06/2018    ALKPHOS 65 12/06/2018    LABIL2 1.5 04/28/2016     Lab Results   Component Value Date    CKTOTAL 45 12/06/2018     Lab Results   Component Value Date    WBC 9.27 12/06/2018    HGB 13.9 12/06/2018    HCT 42.8 12/06/2018     12/06/2018      No results found for: INR  No results found for: MG  Lab Results   Component Value Date    TSH 1.322 12/06/2018     No results found for: BNP  Lab Results   Component Value Date    CHLPL 184 04/28/2016    CHOL 176 12/06/2018    TRIG 141 12/06/2018    HDL 51 (L) 12/06/2018    VLDL 28.2 12/06/2018    LDLHDL 1.90 12/06/2018     Lab Results   Component Value Date    LDL 97 12/06/2018     (H) 10/19/2018       Procedures       I personally viewed and interpreted the patient's LAB data         Assessment:     1. Encounter for screening mammogram for malignant neoplasm of breast    2. Essential hypertension    3. Other emphysema (CMS/HCC)    4. Other specified hypothyroidism    5. Colon cancer screening          Plan:      Lab work reviewed and discussed with the patient lipids are significantly better.  Patient was advised to continue Lipitor and try to follow diet.    Blood sugar is also better she will continue metformin and Tradjenta.  Blood pressure is also very well controlled she will continue losartan and Lopressor  Thyroid 100 µg daily was also continued.  Surgical consultation was requested for the cyst in the popliteal fossa.  Patient has a pulmonary consult with Dr. ledesma in next few days.  Mammogram and cologuard were arranged.  Other health maintenance issues were discussed but patient declined.  Follow-up scheduled        No Follow-up on file.

## 2018-12-13 ENCOUNTER — HOSPITAL ENCOUNTER (OUTPATIENT)
Dept: GENERAL RADIOLOGY | Facility: HOSPITAL | Age: 63
Discharge: HOME | End: 2018-12-13
Attending: INTERNAL MEDICINE | Admitting: INTERNAL MEDICINE

## 2018-12-13 ENCOUNTER — OFFICE VISIT (OUTPATIENT)
Dept: PULMONOLOGY | Facility: CLINIC | Age: 63
End: 2018-12-13

## 2018-12-13 VITALS
WEIGHT: 162.2 LBS | OXYGEN SATURATION: 96 % | BODY MASS INDEX: 28.74 KG/M2 | DIASTOLIC BLOOD PRESSURE: 96 MMHG | HEIGHT: 63 IN | SYSTOLIC BLOOD PRESSURE: 157 MMHG | HEART RATE: 64 BPM

## 2018-12-13 DIAGNOSIS — R06.02 SOB (SHORTNESS OF BREATH): Primary | ICD-10-CM

## 2018-12-13 DIAGNOSIS — R06.02 SOB (SHORTNESS OF BREATH): ICD-10-CM

## 2018-12-13 DIAGNOSIS — J44.9 CHRONIC OBSTRUCTIVE PULMONARY DISEASE, UNSPECIFIED COPD TYPE (HCC): Primary | ICD-10-CM

## 2018-12-13 LAB
FEV1: 1.51 LITERS
FVC VOL RESPIRATORY: 2.09 LITERS

## 2018-12-13 PROCEDURE — 94010 BREATHING CAPACITY TEST: CPT | Performed by: INTERNAL MEDICINE

## 2018-12-13 PROCEDURE — 99202 OFFICE O/P NEW SF 15 MIN: CPT | Performed by: INTERNAL MEDICINE

## 2018-12-13 PROCEDURE — 71046 X-RAY EXAM CHEST 2 VIEWS: CPT | Performed by: RADIOLOGY

## 2018-12-13 PROCEDURE — 71046 X-RAY EXAM CHEST 2 VIEWS: CPT

## 2018-12-13 ASSESSMENT — PULMONARY FUNCTION TESTS
FEV1: 1.51
FVC: 2.09

## 2018-12-13 NOTE — PROGRESS NOTES
Subjective   Chief Complaint   Patient presents with   • Cough   • Wheezing       Leonarda Diaz is a 63 y.o. female     History of Present Illness  63-year-old female referred for evaluation of COPD she came to the Office with her  giving history of smoking One pack a day for 40 years however currently down to half a pack a day And she is followed by GI Dr. Jean Baptiste for diabetes and hypertension 7 Brio and 4 shortness of breath singular 4 allergies and albuterol inhaler 2 puffs every 4 hours.  She does not want to try NicoDerm patches and refuses to take flu vaccine recommended by her family physician    Review of Systems minimal cigarette cough and some shortness of breath on exertion no chest pain palpitation or edema    Family History   Problem Relation Age of Onset   • Thyroid cancer Mother    • Aneurysm Mother    • Hypertension Mother    • Diabetes Mother    • Arthritis Mother    • Heart attack Father    • Heart disease Father    • Diabetes Brother    • Kidney failure Brother    • Hypertension Brother    • Diabetes Brother    • Hypertension Brother    • Breast cancer Maternal Aunt        Past Medical History:   Diagnosis Date   • B12 deficiency    • Bronchitis, acute    • Chronic pain syndrome    • COPD (chronic obstructive pulmonary disease) (CMS/HCC)    • Diabetes mellitus (CMS/HCC)    • Disease of thyroid gland    • Hyperlipidemia    • Hypertension        Past Surgical History:   Procedure Laterality Date   • CHOLECYSTECTOMY     • COLONOSCOPY W/ BIOPSIES  2012   • HYSTERECTOMY     • VARICOSE VEIN SURGERY Right        Social History     Socioeconomic History   • Marital status:      Spouse name: Not on file   • Number of children: Not on file   • Years of education: Not on file   • Highest education level: Not on file   Social Needs   • Financial resource strain: Not on file   • Food insecurity - worry: Not on file   • Food insecurity - inability: Not on file   • Transportation needs - medical:  Not on file   • Transportation needs - non-medical: Not on file   Occupational History   • Not on file   Tobacco Use   • Smoking status: Current Every Day Smoker     Packs/day: 1.00     Years: 10.00     Pack years: 10.00     Types: Cigarettes   • Smokeless tobacco: Never Used   Substance and Sexual Activity   • Alcohol use: No   • Drug use: No   • Sexual activity: Defer   Other Topics Concern   • Not on file   Social History Narrative   • Not on file        Physical Exam: No acute distress vital signs stable lungs Clear with minimal expiratory rhonchi heart regular no clubbing cyanosis or edema    PFT: FVC 68 FEV1 64% indicating mild obstructive impairment    Imaging: Chest x-ray clear no cardiomegaly    Other Labs:       ASSESSMENT COPD Seemingly mild  Recommendation the patient is trying to cut down her cigarette not interested in trying NicoDerm patches and does not seem to be Complying With instructions such as getting Flu vaccination      Recommendations: Continue current medications try to quit smoking or cut down at least and urged her to get annual flu vaccine and pneumonia vaccine per protocol    Follow up: Dr. Jean Baptiste and return to us in case of succeeding to quit smoking To prove that her lung function will improve.  Otherwise return to us in case of worsening COPD

## 2018-12-16 ENCOUNTER — RESULTS ENCOUNTER (OUTPATIENT)
Dept: CARDIOLOGY | Facility: CLINIC | Age: 63
End: 2018-12-16

## 2018-12-16 DIAGNOSIS — Z12.11 COLON CANCER SCREENING: ICD-10-CM

## 2018-12-17 ENCOUNTER — OFFICE VISIT (OUTPATIENT)
Dept: SURGERY | Facility: CLINIC | Age: 63
End: 2018-12-17

## 2018-12-17 VITALS
WEIGHT: 162 LBS | SYSTOLIC BLOOD PRESSURE: 122 MMHG | HEART RATE: 82 BPM | BODY MASS INDEX: 28.7 KG/M2 | HEIGHT: 63 IN | DIASTOLIC BLOOD PRESSURE: 70 MMHG

## 2018-12-17 DIAGNOSIS — L72.3 SEBACEOUS CYST: Primary | ICD-10-CM

## 2018-12-17 PROCEDURE — 11401 EXC TR-EXT B9+MARG 0.6-1 CM: CPT | Performed by: SURGERY

## 2018-12-17 NOTE — PROGRESS NOTES
Subjective   Leonarda Diaz is a 63 y.o. female.     History of Present Illness She has had a small lump on the back of her right knee that is growing. It gets sore as well. No drainage. She does have a lot of varicose veins.     The following portions of the patient's history were reviewed and updated as appropriate: current medications, past family history, past medical history, past social history, past surgical history and problem list.    Review of Systems skin lesion    Objective   Physical Exam 1 cm cyst excised from skin behind right knee    Assessment/Plan   Leonarda was seen today for mass.    Diagnoses and all orders for this visit:    Sebaceous cyst    remove sutures 2 wks.

## 2018-12-18 ENCOUNTER — HOSPITAL ENCOUNTER (OUTPATIENT)
Dept: MAMMOGRAPHY | Facility: HOSPITAL | Age: 63
Discharge: HOME | End: 2018-12-18
Admitting: INTERNAL MEDICINE

## 2018-12-18 PROCEDURE — 77067 SCR MAMMO BI INCL CAD: CPT

## 2018-12-18 PROCEDURE — 77063 BREAST TOMOSYNTHESIS BI: CPT | Performed by: RADIOLOGY

## 2018-12-18 PROCEDURE — 77067 SCR MAMMO BI INCL CAD: CPT | Performed by: RADIOLOGY

## 2018-12-18 PROCEDURE — 77063 BREAST TOMOSYNTHESIS BI: CPT

## 2018-12-31 ENCOUNTER — OFFICE VISIT (OUTPATIENT)
Dept: SURGERY | Facility: CLINIC | Age: 63
End: 2018-12-31

## 2018-12-31 VITALS — WEIGHT: 162 LBS | BODY MASS INDEX: 28.7 KG/M2 | HEIGHT: 63 IN

## 2018-12-31 DIAGNOSIS — L72.3 SEBACEOUS CYST: Primary | ICD-10-CM

## 2018-12-31 PROCEDURE — 99212 OFFICE O/P EST SF 10 MIN: CPT | Performed by: SURGERY

## 2018-12-31 NOTE — PROGRESS NOTES
Subjective   Leonarda Diaz is a 63 y.o. female.     History of Present Illness She is doing well from the cyst excision on her leg.    The following portions of the patient's history were reviewed and updated as appropriate: current medications, past family history, past medical history, past social history, past surgical history and problem list.    Review of Systems    Objective   Physical Exam wound ok sutures removed    Assessment/Plan   Leonarda was seen today for follow-up.    Diagnoses and all orders for this visit:    Sebaceous cyst    return prn

## 2019-03-11 ENCOUNTER — OFFICE VISIT (OUTPATIENT)
Dept: CARDIOLOGY | Facility: CLINIC | Age: 64
End: 2019-03-11

## 2019-03-11 VITALS
BODY MASS INDEX: 29.23 KG/M2 | HEART RATE: 98 BPM | WEIGHT: 165 LBS | HEIGHT: 63 IN | SYSTOLIC BLOOD PRESSURE: 132 MMHG | OXYGEN SATURATION: 98 % | DIASTOLIC BLOOD PRESSURE: 80 MMHG

## 2019-03-11 DIAGNOSIS — E11.10 UNCONTROLLED TYPE 2 DIABETES MELLITUS WITH KETOACIDOSIS WITHOUT COMA (HCC): ICD-10-CM

## 2019-03-11 DIAGNOSIS — J43.8 OTHER EMPHYSEMA (HCC): ICD-10-CM

## 2019-03-11 DIAGNOSIS — E78.2 MIXED HYPERLIPIDEMIA: Primary | ICD-10-CM

## 2019-03-11 DIAGNOSIS — I10 ESSENTIAL HYPERTENSION: ICD-10-CM

## 2019-03-11 DIAGNOSIS — E03.8 OTHER SPECIFIED HYPOTHYROIDISM: ICD-10-CM

## 2019-03-11 PROCEDURE — 99214 OFFICE O/P EST MOD 30 MIN: CPT | Performed by: INTERNAL MEDICINE

## 2019-03-11 RX ORDER — ALBUTEROL SULFATE 90 UG/1
2 AEROSOL, METERED RESPIRATORY (INHALATION) EVERY 4 HOURS PRN
Qty: 6.7 G | Refills: 3 | Status: SHIPPED | OUTPATIENT
Start: 2019-03-11 | End: 2019-06-17 | Stop reason: SDUPTHER

## 2019-03-11 RX ORDER — ATORVASTATIN CALCIUM 10 MG/1
10 TABLET, FILM COATED ORAL DAILY
Qty: 90 TABLET | Refills: 3 | Status: SHIPPED | OUTPATIENT
Start: 2019-03-11 | End: 2020-04-08 | Stop reason: SDUPTHER

## 2019-03-11 RX ORDER — FUROSEMIDE 20 MG/1
20 TABLET ORAL DAILY
Qty: 90 TABLET | Refills: 3 | Status: SHIPPED | OUTPATIENT
Start: 2019-03-11 | End: 2020-07-08 | Stop reason: SDUPTHER

## 2019-03-11 NOTE — PROGRESS NOTES
subjective     Chief Complaint   Patient presents with   • Hypertension   • Follow-up     History of Present Illness  Patient is 63 years old white female who is here for follow-up.  Patient has multiple chronic medical problems.  Patient has hyperlipidemia.  She is taking Lipitor regularly without any side effects.  She is also trying to diet.    Patient also has hypertension blood pressure is very well controlled.  She is taking Lopressor and losartan.    Diabetes mellitus.  Patient is taking metformin 1000 twice daily and Tradjenta 5 mg daily.    Patient also has hypothyroidism.  She is taking Synthroid 100 mcg daily.  She also has COPD and multiple environmental allergies.  She is taking Singulair along with Brio Ellipta and albuterol.    Past Surgical History:   Procedure Laterality Date   • CHOLECYSTECTOMY     • COLONOSCOPY W/ BIOPSIES  2012   • HYSTERECTOMY     • VARICOSE VEIN SURGERY Right      Family History   Problem Relation Age of Onset   • Thyroid cancer Mother    • Aneurysm Mother    • Hypertension Mother    • Diabetes Mother    • Arthritis Mother    • Heart attack Father    • Heart disease Father    • Diabetes Brother    • Kidney failure Brother    • Hypertension Brother    • Diabetes Brother    • Hypertension Brother    • Breast cancer Maternal Aunt      Past Medical History:   Diagnosis Date   • B12 deficiency    • Bronchitis, acute    • Chronic pain syndrome    • COPD (chronic obstructive pulmonary disease) (CMS/HCC)    • Diabetes mellitus (CMS/HCC)    • Disease of thyroid gland    • Hyperlipidemia    • Hypertension      Patient Active Problem List   Diagnosis   • Acute bronchitis   • B12 deficiency*   • Chronic pain syndrome*   • Hypertension*   • Hyperlipidemia*   • Diabetes mellitus*   • Hypothyroidism*   • Vitamin D deficiency*   • Right ear pain   • Sebaceous cyst       Social History     Tobacco Use   • Smoking status: Current Every Day Smoker     Packs/day: 1.00     Years: 10.00     Pack  years: 10.00     Types: Cigarettes   • Smokeless tobacco: Never Used   Substance Use Topics   • Alcohol use: No   • Drug use: No       Allergies   Allergen Reactions   • Avelox [Moxifloxacin]        Current Outpatient Medications on File Prior to Visit   Medication Sig   • glucose blood test strip 1 each by Other route Daily. Use as instructed   • hydroxychloroquine (PLAQUENIL) 200 MG tablet Take 200 mg by mouth daily.   • levothyroxine (SYNTHROID, LEVOTHROID) 100 MCG tablet TAKE ONE TABLET BY MOUTH EVERY DAY FOR THYROID   • linagliptin (TRADJENTA) 5 MG tablet tablet Take 1 tablet by mouth Daily.   • losartan (COZAAR) 25 MG tablet Take 1 tablet by mouth Daily.   • metFORMIN (GLUCOPHAGE) 1000 MG tablet Take 1 tablet by mouth 2 (Two) Times a Day With Meals.   • metoprolol tartrate (LOPRESSOR) 25 MG tablet Take 1 tablet by mouth Every 12 (Twelve) Hours.   • montelukast (SINGULAIR) 10 MG tablet Take 1 tablet by mouth Every Night.   • ONE TOUCH LANCETS [facility] [place] TWICE A DAY TO CHECK BLOOD SUGAR   • vitamin B-12 (CYANOCOBALAMIN) 500 MCG tablet Take 1,000 mcg by mouth daily.   • [DISCONTINUED] albuterol (PROVENTIL HFA;VENTOLIN HFA) 108 (90 Base) MCG/ACT inhaler Inhale 2 puffs Every 4 (Four) Hours As Needed for Wheezing.   • [DISCONTINUED] atorvastatin (LIPITOR) 10 MG tablet Take 1 tablet by mouth Daily.   • [DISCONTINUED] Fluticasone Furoate-Vilanterol (BREO ELLIPTA) 100-25 MCG/INH aerosol powder  Inhale.   • [DISCONTINUED] furosemide (LASIX) 20 MG tablet Take 1 tablet by mouth Daily.     No current facility-administered medications on file prior to visit.          The following portions of the patient's history were reviewed and updated as appropriate: allergies, current medications, past family history, past medical history, past social history, past surgical history and problem list.    Review of Systems   Constitution: Negative.   HENT: Negative.  Negative for congestion.    Eyes: Negative.    Cardiovascular: Negative.  " Negative for chest pain, cyanosis, dyspnea on exertion, irregular heartbeat, leg swelling, near-syncope, orthopnea, palpitations, paroxysmal nocturnal dyspnea and syncope.   Respiratory: Negative.  Negative for shortness of breath.    Hematologic/Lymphatic: Negative.    Musculoskeletal: Negative.    Gastrointestinal: Negative.    Neurological: Negative.  Negative for headaches.          Objective:     /80 (BP Location: Left arm, Patient Position: Sitting)   Pulse 98   Ht 160 cm (63\")   Wt 74.8 kg (165 lb)   SpO2 98%   BMI 29.23 kg/m²   Physical Exam   Constitutional: She appears well-developed and well-nourished. No distress.   HENT:   Head: Normocephalic and atraumatic.   Mouth/Throat: Oropharynx is clear and moist. No oropharyngeal exudate.   Eyes: Conjunctivae and EOM are normal. Pupils are equal, round, and reactive to light. No scleral icterus.   Neck: Normal range of motion. Neck supple. No JVD present. No tracheal deviation present. No thyromegaly present.   Cardiovascular: Normal rate, regular rhythm, normal heart sounds and intact distal pulses. PMI is not displaced. Exam reveals no gallop, no friction rub and no decreased pulses.   No murmur heard.  Pulses:       Carotid pulses are 3+ on the right side, and 3+ on the left side.       Radial pulses are 3+ on the right side, and 3+ on the left side.   Pulmonary/Chest: Effort normal and breath sounds normal. No respiratory distress. She has no wheezes. She has no rales. She exhibits no tenderness.   Abdominal: Soft. Bowel sounds are normal. She exhibits no distension, no abdominal bruit and no mass. There is no splenomegaly or hepatomegaly. There is no tenderness. There is no rebound and no guarding.   Musculoskeletal: Normal range of motion. She exhibits no edema, tenderness or deformity.   Lymphadenopathy:     She has no cervical adenopathy.   Neurological: She is alert. She has normal reflexes. No cranial nerve deficit. She exhibits normal " muscle tone. Coordination normal.   Skin: Skin is warm and dry. No rash noted. She is not diaphoretic. No erythema.   Psychiatric: She has a normal mood and affect. Her behavior is normal. Judgment and thought content normal.         Lab Review  Lab Results   Component Value Date     12/06/2018    K 4.6 12/06/2018     12/06/2018    BUN 14 12/06/2018    CREATININE 0.84 12/06/2018    GLUCOSE 116 (H) 12/06/2018    CALCIUM 8.9 12/06/2018    ALT 12 12/06/2018    ALKPHOS 65 12/06/2018    LABIL2 1.5 04/28/2016     Lab Results   Component Value Date    CKTOTAL 45 12/06/2018     Lab Results   Component Value Date    WBC 9.27 12/06/2018    HGB 13.9 12/06/2018    HCT 42.8 12/06/2018     12/06/2018     No results found for: INR  No results found for: MG  Lab Results   Component Value Date    TSH 1.322 12/06/2018     No results found for: BNP  Lab Results   Component Value Date    CHLPL 184 04/28/2016    CHOL 176 12/06/2018    TRIG 141 12/06/2018    HDL 51 (L) 12/06/2018    VLDL 28.2 12/06/2018    LDLHDL 1.90 12/06/2018     Lab Results   Component Value Date    LDL 97 12/06/2018     (H) 10/19/2018       Procedures       I personally viewed and interpreted the patient's LAB data         Assessment:     1. Mixed hyperlipidemia    2. Uncontrolled type 2 diabetes mellitus with ketoacidosis without coma (CMS/[facility])    3. Essential hypertension    4. Other emphysema (CMS/[facility])    5. Other specified hypothyroidism          Plan:     Patient is 63 years old white female who is here for follow-up.  Patient has multiple chronic medical problems.  She has hyperlipidemia.  She was advised to continue Lipitor.  Lab work scheduled for next visit.  Last LDL was 97.    Patient has diabetes mellitus she will continue Tradjenta and Metformin.  Hemoglobin A1c along with urine for microalbumin scheduled for next visit.    Blood pressure is also very well controlled patient was advised to continue Lopressor and  losartan.  Thyroid functions have been normal on Synthroid 100 mcg daily which was continued.  She also has multiple environmental allergies and COPD Brio Ellipta, Proventil HFA was continued.  She will also continue Singulair.  Refills of all medications were given.  Lab work scheduled follow-up in 3 months  Healthy lifestyle discussed.  Health maintenance issues were also discussed        No Follow-up on file.

## 2019-04-08 ENCOUNTER — TELEPHONE (OUTPATIENT)
Dept: CARDIOLOGY | Facility: CLINIC | Age: 64
End: 2019-04-08

## 2019-04-08 RX ORDER — LEVOTHYROXINE SODIUM 0.1 MG/1
TABLET ORAL
Qty: 90 TABLET | Refills: 2 | Status: SHIPPED | OUTPATIENT
Start: 2019-04-08 | End: 2019-11-22 | Stop reason: DRUGHIGH

## 2019-04-08 NOTE — TELEPHONE ENCOUNTER
LEVOTHYROXINE  100 MG 1 QD  [facility] [place]      PATIENT SAID THEY HAD ASKED THE PHARMACY TO REQUEST REFILLS AND THEY REFUSED. TOLD HER SHE HAD NO REFILLS LEFT SHE NEEDED TO REQUEST THEM FROM US.

## 2019-06-10 ENCOUNTER — LAB (OUTPATIENT)
Dept: LAB | Facility: HOSPITAL | Age: 64
End: 2019-06-10

## 2019-06-10 DIAGNOSIS — E11.10 UNCONTROLLED TYPE 2 DIABETES MELLITUS WITH KETOACIDOSIS WITHOUT COMA (HCC): ICD-10-CM

## 2019-06-10 DIAGNOSIS — E03.8 OTHER SPECIFIED HYPOTHYROIDISM: ICD-10-CM

## 2019-06-10 DIAGNOSIS — E78.2 MIXED HYPERLIPIDEMIA: ICD-10-CM

## 2019-06-10 LAB
ALBUMIN SERPL-MCNC: 4.1 G/DL (ref 3.5–5.2)
ALBUMIN UR-MCNC: 6.7 MG/L
ALBUMIN/GLOB SERPL: 1.5 G/DL
ALP SERPL-CCNC: 58 U/L (ref 39–117)
ALT SERPL W P-5'-P-CCNC: 10 U/L (ref 1–33)
ANION GAP SERPL CALCULATED.3IONS-SCNC: 11.7 MMOL/L
AST SERPL-CCNC: 6 U/L (ref 1–32)
BASOPHILS # BLD AUTO: 0.07 10*3/MM3 (ref 0–0.2)
BASOPHILS NFR BLD AUTO: 0.7 % (ref 0–1.5)
BILIRUB SERPL-MCNC: 0.3 MG/DL (ref 0.2–1.2)
BUN BLD-MCNC: 9 MG/DL (ref 8–23)
BUN/CREAT SERPL: 11.8 (ref 7–25)
CALCIUM SPEC-SCNC: 9.2 MG/DL (ref 8.6–10.5)
CHLORIDE SERPL-SCNC: 102 MMOL/L (ref 98–107)
CHOLEST SERPL-MCNC: 167 MG/DL (ref 0–200)
CK SERPL-CCNC: 40 U/L (ref 20–180)
CO2 SERPL-SCNC: 27.3 MMOL/L (ref 22–29)
CREAT BLD-MCNC: 0.76 MG/DL (ref 0.57–1)
DEPRECATED RDW RBC AUTO: 50.9 FL (ref 37–54)
EOSINOPHIL # BLD AUTO: 0.26 10*3/MM3 (ref 0–0.4)
EOSINOPHIL NFR BLD AUTO: 2.7 % (ref 0.3–6.2)
ERYTHROCYTE [DISTWIDTH] IN BLOOD BY AUTOMATED COUNT: 13.8 % (ref 12.3–15.4)
GFR SERPL CREATININE-BSD FRML MDRD: 76 ML/MIN/1.73
GLOBULIN UR ELPH-MCNC: 2.7 GM/DL
GLUCOSE BLD-MCNC: 107 MG/DL (ref 65–99)
HBA1C MFR BLD: 5.88 % (ref 4.8–5.6)
HCT VFR BLD AUTO: 40.4 % (ref 34–46.6)
HDLC SERPL-MCNC: 42 MG/DL (ref 40–60)
HGB BLD-MCNC: 12.6 G/DL (ref 12–15.9)
IMM GRANULOCYTES # BLD AUTO: 0.03 10*3/MM3 (ref 0–0.05)
IMM GRANULOCYTES NFR BLD AUTO: 0.3 % (ref 0–0.5)
LDLC SERPL CALC-MCNC: 94 MG/DL (ref 0–100)
LDLC/HDLC SERPL: 2.23 {RATIO}
LYMPHOCYTES # BLD AUTO: 1.94 10*3/MM3 (ref 0.7–3.1)
LYMPHOCYTES NFR BLD AUTO: 20.1 % (ref 19.6–45.3)
MCH RBC QN AUTO: 31 PG (ref 26.6–33)
MCHC RBC AUTO-ENTMCNC: 31.2 G/DL (ref 31.5–35.7)
MCV RBC AUTO: 99.5 FL (ref 79–97)
MONOCYTES # BLD AUTO: 0.63 10*3/MM3 (ref 0.1–0.9)
MONOCYTES NFR BLD AUTO: 6.5 % (ref 5–12)
NEUTROPHILS # BLD AUTO: 6.73 10*3/MM3 (ref 1.7–7)
NEUTROPHILS NFR BLD AUTO: 69.7 % (ref 42.7–76)
NRBC BLD AUTO-RTO: 0 /100 WBC (ref 0–0.2)
PLATELET # BLD AUTO: 293 10*3/MM3 (ref 140–450)
PMV BLD AUTO: 11.3 FL (ref 6–12)
POTASSIUM BLD-SCNC: 4.6 MMOL/L (ref 3.5–5.2)
PROT SERPL-MCNC: 6.8 G/DL (ref 6–8.5)
RBC # BLD AUTO: 4.06 10*6/MM3 (ref 3.77–5.28)
SODIUM BLD-SCNC: 141 MMOL/L (ref 136–145)
T4 FREE SERPL-MCNC: 1.93 NG/DL (ref 0.93–1.7)
TRIGL SERPL-MCNC: 157 MG/DL (ref 0–150)
TSH SERPL DL<=0.05 MIU/L-ACNC: 0.99 MIU/ML (ref 0.27–4.2)
VLDLC SERPL-MCNC: 31.4 MG/DL (ref 5–40)
WBC NRBC COR # BLD: 9.66 10*3/MM3 (ref 3.4–10.8)

## 2019-06-10 PROCEDURE — 36415 COLL VENOUS BLD VENIPUNCTURE: CPT

## 2019-06-10 PROCEDURE — 84443 ASSAY THYROID STIM HORMONE: CPT

## 2019-06-10 PROCEDURE — 80053 COMPREHEN METABOLIC PANEL: CPT

## 2019-06-10 PROCEDURE — 84439 ASSAY OF FREE THYROXINE: CPT

## 2019-06-10 PROCEDURE — 85025 COMPLETE CBC W/AUTO DIFF WBC: CPT

## 2019-06-10 PROCEDURE — 82550 ASSAY OF CK (CPK): CPT

## 2019-06-10 PROCEDURE — 80061 LIPID PANEL: CPT

## 2019-06-10 PROCEDURE — 82043 UR ALBUMIN QUANTITATIVE: CPT

## 2019-06-10 PROCEDURE — 83036 HEMOGLOBIN GLYCOSYLATED A1C: CPT

## 2019-06-17 ENCOUNTER — OFFICE VISIT (OUTPATIENT)
Dept: CARDIOLOGY | Facility: CLINIC | Age: 64
End: 2019-06-17

## 2019-06-17 VITALS
HEIGHT: 63 IN | WEIGHT: 160 LBS | SYSTOLIC BLOOD PRESSURE: 114 MMHG | BODY MASS INDEX: 28.35 KG/M2 | OXYGEN SATURATION: 98 % | HEART RATE: 77 BPM | DIASTOLIC BLOOD PRESSURE: 68 MMHG

## 2019-06-17 DIAGNOSIS — E11.9 TYPE 2 DIABETES MELLITUS WITHOUT COMPLICATION, WITHOUT LONG-TERM CURRENT USE OF INSULIN (HCC): ICD-10-CM

## 2019-06-17 DIAGNOSIS — I10 ESSENTIAL HYPERTENSION: ICD-10-CM

## 2019-06-17 DIAGNOSIS — Z13.820 OSTEOPOROSIS SCREENING: ICD-10-CM

## 2019-06-17 DIAGNOSIS — Z78.0 POSTMENOPAUSE: ICD-10-CM

## 2019-06-17 DIAGNOSIS — E03.9 HYPOTHYROIDISM, UNSPECIFIED TYPE: ICD-10-CM

## 2019-06-17 DIAGNOSIS — E78.2 MIXED HYPERLIPIDEMIA: Primary | ICD-10-CM

## 2019-06-17 DIAGNOSIS — J42 CHRONIC BRONCHITIS, UNSPECIFIED CHRONIC BRONCHITIS TYPE (HCC): ICD-10-CM

## 2019-06-17 PROCEDURE — 99214 OFFICE O/P EST MOD 30 MIN: CPT | Performed by: INTERNAL MEDICINE

## 2019-06-17 RX ORDER — ALBUTEROL SULFATE 90 UG/1
2 AEROSOL, METERED RESPIRATORY (INHALATION) EVERY 4 HOURS PRN
Qty: 6.7 G | Refills: 3 | Status: SHIPPED | OUTPATIENT
Start: 2019-06-17 | End: 2020-07-08 | Stop reason: SDUPTHER

## 2019-06-17 NOTE — PROGRESS NOTES
subjective     Chief Complaint   Patient presents with   • Hyperlipidemia   • Follow-up     History of Present Illness  Patient is 64 years old white female who is here for follow-up of multiple chronic medical problems.  Patient has essential hypertension.  She is taking losartan and Lopressor.  Blood pressure is very well controlled.  Heartbeat is also controlled.  No drug side effects.    Patient has hyperlipidemia.  She is taking Lipitor 10 mg daily.  She is tolerating medication very well.  She also has hypothyroidism on Synthroid 100 mcg daily.  Well controlled with metformin and Tradjenta.  Patient is trying to follow diet.  She checks her sugar at home overall she thinks she seems to be doing very well.    Past Surgical History:   Procedure Laterality Date   • CHOLECYSTECTOMY     • COLONOSCOPY W/ BIOPSIES  2012   • HYSTERECTOMY     • VARICOSE VEIN SURGERY Right      Family History   Problem Relation Age of Onset   • Thyroid cancer Mother    • Aneurysm Mother    • Hypertension Mother    • Diabetes Mother    • Arthritis Mother    • Heart attack Father    • Heart disease Father    • Diabetes Brother    • Kidney failure Brother    • Hypertension Brother    • Diabetes Brother    • Hypertension Brother    • Breast cancer Maternal Aunt      Past Medical History:   Diagnosis Date   • B12 deficiency    • Bronchitis, acute    • Chronic pain syndrome    • COPD (chronic obstructive pulmonary disease) (CMS/HCC)    • Diabetes mellitus (CMS/HCC)    • Disease of thyroid gland    • Hyperlipidemia    • Hypertension      Patient Active Problem List   Diagnosis   • Acute bronchitis   • B12 deficiency*   • Chronic pain syndrome*   • Hypertension*   • Hyperlipidemia*   • Diabetes mellitus*   • Hypothyroidism*   • Vitamin D deficiency*   • Right ear pain   • Sebaceous cyst   • Chronic bronchitis (CMS/HCC)       Social History     Tobacco Use   • Smoking status: Current Every Day Smoker     Packs/day: 1.00     Years: 10.00     Pack  years: 10.00     Types: Cigarettes   • Smokeless tobacco: Never Used   Substance Use Topics   • Alcohol use: No   • Drug use: No       Allergies   Allergen Reactions   • Avelox [Moxifloxacin]        Current Outpatient Medications on File Prior to Visit   Medication Sig   • atorvastatin (LIPITOR) 10 MG tablet Take 1 tablet by mouth Daily.   • Fluticasone Furoate-Vilanterol (BREO ELLIPTA) 100-25 MCG/INH inhaler Inhale 1 puff Daily.   • furosemide (LASIX) 20 MG tablet Take 1 tablet by mouth Daily.   • hydroxychloroquine (PLAQUENIL) 200 MG tablet Take 200 mg by mouth daily.   • levothyroxine (SYNTHROID, LEVOTHROID) 100 MCG tablet TAKE ONE TABLET BY MOUTH EVERY DAY FOR THYROID   • linagliptin (TRADJENTA) 5 MG tablet tablet Take 1 tablet by mouth Daily.   • losartan (COZAAR) 25 MG tablet Take 1 tablet by mouth Daily.   • metFORMIN (GLUCOPHAGE) 1000 MG tablet Take 1 tablet by mouth 2 (Two) Times a Day With Meals.   • metoprolol tartrate (LOPRESSOR) 25 MG tablet Take 1 tablet by mouth Every 12 (Twelve) Hours.   • montelukast (SINGULAIR) 10 MG tablet Take 1 tablet by mouth Every Night.   • ONE TOUCH LANCETS [facility] USES TWICE A DAY TO CHECK BLOOD SUGAR   • vitamin B-12 (CYANOCOBALAMIN) 500 MCG tablet Take 1,000 mcg by mouth daily.   • [DISCONTINUED] albuterol sulfate  (90 Base) MCG/ACT inhaler Inhale 2 puffs Every 4 (Four) Hours As Needed for Wheezing.   • [DISCONTINUED] glucose blood test strip 1 each by Other route Daily. Use as instructed     No current facility-administered medications on file prior to visit.          The following portions of the patient's history were reviewed and updated as appropriate: allergies, current medications, past family history, past medical history, past social history, past surgical history and problem list.    Review of Systems   Constitution: Negative.   HENT: Negative.  Negative for congestion.    Eyes: Negative.    Cardiovascular: Negative.  Negative for chest pain, cyanosis,  "dyspnea on exertion, irregular heartbeat, leg swelling, near-syncope, orthopnea, palpitations, paroxysmal nocturnal dyspnea and syncope.   Respiratory: Negative.  Negative for shortness of breath.    Hematologic/Lymphatic: Negative.    Musculoskeletal: Negative.    Gastrointestinal: Negative.    Neurological: Negative.  Negative for headaches.          Objective:     /68 (BP Location: Left arm, Patient Position: Sitting)   Pulse 77   Ht 160 cm (63\")   Wt 72.6 kg (160 lb)   SpO2 98%   BMI 28.34 kg/m²   Physical Exam   Constitutional: She appears well-developed and well-nourished. No distress.   HENT:   Head: Normocephalic and atraumatic.   Mouth/Throat: Oropharynx is clear and moist. No oropharyngeal exudate.   Eyes: Conjunctivae and EOM are normal. Pupils are equal, round, and reactive to light. No scleral icterus.   Neck: Normal range of motion. Neck supple. No JVD present. No tracheal deviation present. No thyromegaly present.   Cardiovascular: Normal rate, regular rhythm, normal heart sounds and intact distal pulses. PMI is not displaced. Exam reveals no gallop, no friction rub and no decreased pulses.   No murmur heard.  Pulses:       Carotid pulses are 3+ on the right side, and 3+ on the left side.       Radial pulses are 3+ on the right side, and 3+ on the left side.   Pulmonary/Chest: Effort normal. No respiratory distress. She has wheezes. She has no rales. She exhibits no tenderness.   Abdominal: Soft. Bowel sounds are normal. She exhibits no distension, no abdominal bruit and no mass. There is no splenomegaly or hepatomegaly. There is no tenderness. There is no rebound and no guarding.   Musculoskeletal: Normal range of motion. She exhibits no edema, tenderness or deformity.   Lymphadenopathy:     She has no cervical adenopathy.   Neurological: She is alert. She has normal reflexes. No cranial nerve deficit. She exhibits normal muscle tone. Coordination normal.   Skin: Skin is warm and dry. No " rash noted. She is not diaphoretic. No erythema.   Psychiatric: She has a normal mood and affect. Her behavior is normal. Judgment and thought content normal.         Lab Review  Lab Results   Component Value Date     06/10/2019    K 4.6 06/10/2019     06/10/2019    BUN 9 06/10/2019    CREATININE 0.76 06/10/2019    GLUCOSE 107 (H) 06/10/2019    CALCIUM 9.2 06/10/2019    ALT 10 06/10/2019    ALKPHOS 58 06/10/2019    LABIL2 1.5 04/28/2016     Lab Results   Component Value Date    CKTOTAL 40 06/10/2019     Lab Results   Component Value Date    WBC 9.66 06/10/2019    HGB 12.6 06/10/2019    HCT 40.4 06/10/2019     06/10/2019     No results found for: INR  No results found for: MG  Lab Results   Component Value Date    TSH 0.992 06/10/2019     No results found for: BNP  Lab Results   Component Value Date    CHLPL 184 04/28/2016    CHOL 167 06/10/2019    TRIG 157 (H) 06/10/2019    HDL 42 06/10/2019    VLDL 31.4 06/10/2019    LDLHDL 2.23 06/10/2019     Lab Results   Component Value Date    LDL 94 06/10/2019    LDL 97 12/06/2018       Procedures       I personally viewed and interpreted the patient's LAB data         Assessment:     1. Mixed hyperlipidemia    2. Essential hypertension    3. Type 2 diabetes mellitus without complication, without long-term current use of insulin (CMS/[facility])    4. Hypothyroidism, unspecified type    5. Chronic bronchitis, unspecified chronic bronchitis type (CMS/[facility])    6. Osteoporosis screening    7. Postmenopause          Plan:     Lab work reviewed and discussed with the patient.  Lipid panel is normal she was advised to continue on Lipitor 10 mg daily.  Blood pressure is also very well controlled losartan and metformin was continued.  Blood sugar control is acceptable Tradjenta and metformin were continued.  Healthy lifestyle was again discussed.  Health maintenance issues were discussed.  DEXA scan was scheduled.    Follow-up in 3 months    No Follow-up on file.

## 2019-07-07 DIAGNOSIS — I10 ESSENTIAL HYPERTENSION: ICD-10-CM

## 2019-07-07 DIAGNOSIS — E03.8 OTHER SPECIFIED HYPOTHYROIDISM: ICD-10-CM

## 2019-07-07 DIAGNOSIS — J43.8 OTHER EMPHYSEMA (HCC): ICD-10-CM

## 2019-07-16 ENCOUNTER — HOSPITAL ENCOUNTER (OUTPATIENT)
Dept: BONE DENSITY | Facility: HOSPITAL | Age: 64
Discharge: HOME | End: 2019-07-16
Admitting: INTERNAL MEDICINE

## 2019-07-16 ENCOUNTER — TELEPHONE (OUTPATIENT)
Dept: CARDIOLOGY | Facility: CLINIC | Age: 64
End: 2019-07-16

## 2019-07-16 DIAGNOSIS — Z78.0 POSTMENOPAUSE: ICD-10-CM

## 2019-07-16 DIAGNOSIS — Z13.820 OSTEOPOROSIS SCREENING: ICD-10-CM

## 2019-07-16 PROCEDURE — 77080 DXA BONE DENSITY AXIAL: CPT | Performed by: RADIOLOGY

## 2019-07-16 PROCEDURE — 77080 DXA BONE DENSITY AXIAL: CPT

## 2019-07-16 RX ORDER — ALENDRONATE SODIUM 70 MG/1
70 TABLET ORAL
Qty: 12 TABLET | Refills: 4 | Status: SHIPPED | OUTPATIENT
Start: 2019-07-16 | End: 2020-04-08 | Stop reason: SINTOL

## 2019-07-16 NOTE — TELEPHONE ENCOUNTER
----- Message from Evita Rosario MD sent at 7/16/2019  2:41 PM EDT -----  DEXA scan shows osteoporosis  Start Fosamax 70 weekly and Os-Gian 500 plus D twice daily

## 2019-09-16 ENCOUNTER — OFFICE VISIT (OUTPATIENT)
Dept: CARDIOLOGY | Facility: CLINIC | Age: 64
End: 2019-09-16

## 2019-09-16 VITALS
SYSTOLIC BLOOD PRESSURE: 136 MMHG | BODY MASS INDEX: 28 KG/M2 | WEIGHT: 158 LBS | HEIGHT: 63 IN | DIASTOLIC BLOOD PRESSURE: 82 MMHG | HEART RATE: 67 BPM | OXYGEN SATURATION: 98 %

## 2019-09-16 DIAGNOSIS — M54.50 ACUTE MIDLINE LOW BACK PAIN WITHOUT SCIATICA: ICD-10-CM

## 2019-09-16 DIAGNOSIS — E78.2 MIXED HYPERLIPIDEMIA: Primary | ICD-10-CM

## 2019-09-16 DIAGNOSIS — E11.9 TYPE 2 DIABETES MELLITUS WITHOUT COMPLICATION, WITHOUT LONG-TERM CURRENT USE OF INSULIN (HCC): ICD-10-CM

## 2019-09-16 DIAGNOSIS — I10 ESSENTIAL HYPERTENSION: ICD-10-CM

## 2019-09-16 DIAGNOSIS — M81.0 AGE-RELATED OSTEOPOROSIS WITHOUT CURRENT PATHOLOGICAL FRACTURE: ICD-10-CM

## 2019-09-16 DIAGNOSIS — E03.9 HYPOTHYROIDISM, UNSPECIFIED TYPE: ICD-10-CM

## 2019-09-16 PROCEDURE — 99214 OFFICE O/P EST MOD 30 MIN: CPT | Performed by: INTERNAL MEDICINE

## 2019-09-16 NOTE — PROGRESS NOTES
"subjective     Chief Complaint   Patient presents with   • Results     Dexa scan   • Hyperlipidemia   • Pain     \"bottom of tailbone\"     History of Present Illness    Patient is 64 years old white female who complains of pain in the tailbone.  This happened without any injury.  Is very hard for her to sit and put pressure on the tailbone.  She also has hyperlipidemia and diabetes mellitus she is taking medications regularly.  Blood sugar is much better lipid control is also good.  There are no drug side effects.  Hypertension is very well controlled.  She is taking Lopressor and losartan.  Hypothyroidism is controlled with Synthroid 100 mcg daily.  She also has hyperlipidemia and tolerating medications very well.  Patient recently had DEXA scan which showed osteoporosis however she is having a lot of trouble taking Fosamax.  He will try for 2 more weeks and if she could not take it she will discontinue it.    COPD is fairly well controlled with Brio Ellipta.  She is breathing very well also takes Singulair.  Past Surgical History:   Procedure Laterality Date   • CHOLECYSTECTOMY     • COLONOSCOPY W/ BIOPSIES  2012   • HYSTERECTOMY     • VARICOSE VEIN SURGERY Right      Family History   Problem Relation Age of Onset   • Thyroid cancer Mother    • Aneurysm Mother    • Hypertension Mother    • Diabetes Mother    • Arthritis Mother    • Heart attack Father    • Heart disease Father    • Diabetes Brother    • Kidney failure Brother    • Hypertension Brother    • Diabetes Brother    • Hypertension Brother    • Breast cancer Maternal Aunt      Past Medical History:   Diagnosis Date   • B12 deficiency    • Bronchitis, acute    • Chronic pain syndrome    • COPD (chronic obstructive pulmonary disease) (CMS/HCC)    • Diabetes mellitus (CMS/HCC)    • Disease of thyroid gland    • Hyperlipidemia    • Hypertension      Patient Active Problem List   Diagnosis   • Acute bronchitis   • B12 deficiency*   • Chronic pain syndrome*   • " Hypertension*   • Hyperlipidemia*   • Diabetes mellitus*   • Hypothyroidism*   • Vitamin D deficiency*   • Right ear pain   • Sebaceous cyst   • Chronic bronchitis (CMS/HCC)   • Age-related osteoporosis without current pathological fracture       Social History     Tobacco Use   • Smoking status: Current Every Day Smoker     Packs/day: 1.00     Years: 10.00     Pack years: 10.00     Types: Cigarettes   • Smokeless tobacco: Never Used   Substance Use Topics   • Alcohol use: No   • Drug use: No       Allergies   Allergen Reactions   • Avelox [Moxifloxacin]        Current Outpatient Medications on File Prior to Visit   Medication Sig   • albuterol sulfate  (90 Base) MCG/ACT inhaler Inhale 2 puffs Every 4 (Four) Hours As Needed for Wheezing.   • atorvastatin (LIPITOR) 10 MG tablet Take 1 tablet by mouth Daily.   • Calcium Carb-Cholecalciferol (OS-JOY CALCIUM + D3) 500-200 MG-UNIT tablet Take one PO daily   • Fluticasone Furoate-Vilanterol (BREO ELLIPTA) 100-25 MCG/INH inhaler Inhale 1 puff Daily.   • furosemide (LASIX) 20 MG tablet Take 1 tablet by mouth Daily.   • glucose blood test strip 1 each by Other route Daily. Use as instructed   • hydroxychloroquine (PLAQUENIL) 200 MG tablet Take 200 mg by mouth daily.   • levothyroxine (SYNTHROID, LEVOTHROID) 100 MCG tablet TAKE ONE TABLET BY MOUTH EVERY DAY FOR THYROID   • linagliptin (TRADJENTA) 5 MG tablet tablet Take 1 tablet by mouth Daily.   • losartan (COZAAR) 25 MG tablet Take 1 tablet by mouth Daily.   • metFORMIN (GLUCOPHAGE) 1000 MG tablet TAKE ONE TABLET BY MOUTH TWICE A DAY BEFORE MEALS   • metoprolol tartrate (LOPRESSOR) 25 MG tablet Take 1 tablet by mouth Every 12 (Twelve) Hours.   • montelukast (SINGULAIR) 10 MG tablet Take 1 tablet by mouth Every Night.   • ONE TOUCH LANCETS [facility] [place] USES TWICE A DAY TO CHECK BLOOD SUGAR   • vitamin B-12 (CYANOCOBALAMIN) 500 MCG tablet Take 1,000 mcg by mouth daily.   • alendronate (FOSAMAX) 70 MG tablet Take 1 tablet by  "mouth Every 7 (Seven) Days.     No current facility-administered medications on file prior to visit.          The following portions of the patient's history were reviewed and updated as appropriate: allergies, current medications, past family history, past medical history, past social history, past surgical history and problem list.    Review of Systems   Constitution: Negative.   HENT: Negative.  Negative for congestion.    Eyes: Negative.    Cardiovascular: Negative.  Negative for chest pain, cyanosis, dyspnea on exertion, irregular heartbeat, leg swelling, near-syncope, orthopnea, palpitations, paroxysmal nocturnal dyspnea and syncope.   Respiratory: Negative.  Negative for shortness of breath.    Hematologic/Lymphatic: Negative.    Musculoskeletal: Positive for arthritis and back pain.   Gastrointestinal: Negative.    Neurological: Negative.  Negative for headaches.          Objective:     /82 (BP Location: Left arm, Patient Position: Sitting, Cuff Size: Adult)   Pulse 67   Ht 160 cm (63\")   Wt 71.7 kg (158 lb)   SpO2 98%   BMI 27.99 kg/m²   Physical Exam   Constitutional: She appears well-developed and well-nourished. No distress.   HENT:   Head: Normocephalic and atraumatic.   Mouth/Throat: Oropharynx is clear and moist. No oropharyngeal exudate.   Eyes: Conjunctivae and EOM are normal. Pupils are equal, round, and reactive to light. No scleral icterus.   Neck: Normal range of motion. Neck supple. No JVD present. No tracheal deviation present. No thyromegaly present.   Cardiovascular: Normal rate, regular rhythm, normal heart sounds and intact distal pulses. PMI is not displaced. Exam reveals no gallop, no friction rub and no decreased pulses.   No murmur heard.  Pulses:       Carotid pulses are 3+ on the right side, and 3+ on the left side.       Radial pulses are 3+ on the right side, and 3+ on the left side.   Pulmonary/Chest: Effort normal and breath sounds normal. No respiratory distress. She " has no wheezes. She has no rales. She exhibits no tenderness.   Abdominal: Soft. Bowel sounds are normal. She exhibits no distension, no abdominal bruit and no mass. There is no splenomegaly or hepatomegaly. There is no tenderness. There is no rebound and no guarding.   Musculoskeletal: Normal range of motion. She exhibits no edema, tenderness or deformity.   Lymphadenopathy:     She has no cervical adenopathy.   Neurological: She is alert. She has normal reflexes. No cranial nerve deficit. She exhibits normal muscle tone. Coordination normal.   Skin: Skin is warm and dry. No rash noted. She is not diaphoretic. No erythema.   Psychiatric: She has a normal mood and affect. Her behavior is normal. Judgment and thought content normal.         Lab Review  Lab Results   Component Value Date     06/10/2019    K 4.6 06/10/2019     06/10/2019    BUN 9 06/10/2019    CREATININE 0.76 06/10/2019    GLUCOSE 107 (H) 06/10/2019    CALCIUM 9.2 06/10/2019    ALT 10 06/10/2019    ALKPHOS 58 06/10/2019    LABIL2 1.5 04/28/2016     Lab Results   Component Value Date    CKTOTAL 40 06/10/2019     Lab Results   Component Value Date    WBC 9.66 06/10/2019    HGB 12.6 06/10/2019    HCT 40.4 06/10/2019     06/10/2019     No results found for: INR  No results found for: MG  Lab Results   Component Value Date    TSH 0.992 06/10/2019     No results found for: BNP  Lab Results   Component Value Date    CHLPL 184 04/28/2016    CHOL 167 06/10/2019    TRIG 157 (H) 06/10/2019    HDL 42 06/10/2019    VLDL 31.4 06/10/2019    LDLHDL 2.23 06/10/2019     Lab Results   Component Value Date    LDL 94 06/10/2019    LDL 97 12/06/2018       Procedures       I personally viewed and interpreted the patient's LAB data         Assessment:     1. Mixed hyperlipidemia    2. Essential hypertension    3. Hypothyroidism, unspecified type    4. Type 2 diabetes mellitus without complication, without long-term current use of insulin (CMS/[facility])    5.  Acute midline low back pain without sciatica    6. Age-related osteoporosis without current pathological fracture          Plan:     X-ray of the sacrum and coccyx was arranged  Patient was advised to use donut or soft pillow and keep weight off the tailbone.  She will also take arthritis medications as needed.  Lipid panel has been normal she will continue Lipitor and have lab work 3 more months.  Thyroid functions have been normal she will continue Synthroid 100 mcg daily.    Diabetic control has been excellent and she will continue metformin and Tradjenta.  Blood pressure control is also good.  If she could not tolerate Fosamax she will discontinue Fosamax and continue calcium with vitamin D.  Follow-up scheduled    No Follow-up on file.

## 2019-09-17 ENCOUNTER — TELEPHONE (OUTPATIENT)
Dept: CARDIOLOGY | Facility: CLINIC | Age: 64
End: 2019-09-17

## 2019-09-17 ENCOUNTER — HOSPITAL ENCOUNTER (OUTPATIENT)
Dept: GENERAL RADIOLOGY | Facility: HOSPITAL | Age: 64
Discharge: HOME | End: 2019-09-17
Admitting: INTERNAL MEDICINE

## 2019-09-17 DIAGNOSIS — M54.50 ACUTE MIDLINE LOW BACK PAIN WITHOUT SCIATICA: ICD-10-CM

## 2019-09-17 PROCEDURE — 72220 X-RAY EXAM SACRUM TAILBONE: CPT

## 2019-09-17 PROCEDURE — 72220 X-RAY EXAM SACRUM TAILBONE: CPT | Performed by: RADIOLOGY

## 2019-09-17 NOTE — TELEPHONE ENCOUNTER
----- Message from Evita Rosario MD sent at 9/17/2019  4:42 PM EDT -----  X-ray of the tailbone is normal.  It must be arthritis

## 2019-09-18 ENCOUNTER — TELEPHONE (OUTPATIENT)
Dept: CARDIOLOGY | Facility: CLINIC | Age: 64
End: 2019-09-18

## 2019-09-25 DIAGNOSIS — I10 ESSENTIAL HYPERTENSION: ICD-10-CM

## 2019-09-25 DIAGNOSIS — E03.8 OTHER SPECIFIED HYPOTHYROIDISM: ICD-10-CM

## 2019-09-25 DIAGNOSIS — J43.8 OTHER EMPHYSEMA (HCC): ICD-10-CM

## 2019-11-04 ENCOUNTER — HOSPITAL ENCOUNTER (OUTPATIENT)
Dept: GENERAL RADIOLOGY | Facility: HOSPITAL | Age: 64
Discharge: HOME | End: 2019-11-04
Admitting: INTERNAL MEDICINE

## 2019-11-04 ENCOUNTER — LAB (OUTPATIENT)
Dept: LAB | Facility: HOSPITAL | Age: 64
End: 2019-11-04

## 2019-11-04 ENCOUNTER — OFFICE VISIT (OUTPATIENT)
Dept: CARDIOLOGY | Facility: CLINIC | Age: 64
End: 2019-11-04

## 2019-11-04 VITALS
OXYGEN SATURATION: 99 % | TEMPERATURE: 98.7 F | HEART RATE: 65 BPM | SYSTOLIC BLOOD PRESSURE: 160 MMHG | HEIGHT: 63 IN | DIASTOLIC BLOOD PRESSURE: 90 MMHG | WEIGHT: 160 LBS | BODY MASS INDEX: 28.35 KG/M2

## 2019-11-04 DIAGNOSIS — E03.9 HYPOTHYROIDISM, UNSPECIFIED TYPE: ICD-10-CM

## 2019-11-04 DIAGNOSIS — J20.9 ACUTE BRONCHITIS, UNSPECIFIED ORGANISM: ICD-10-CM

## 2019-11-04 DIAGNOSIS — Z72.0 TOBACCO USE: ICD-10-CM

## 2019-11-04 DIAGNOSIS — I10 ESSENTIAL HYPERTENSION: ICD-10-CM

## 2019-11-04 DIAGNOSIS — E78.2 MIXED HYPERLIPIDEMIA: ICD-10-CM

## 2019-11-04 DIAGNOSIS — E11.9 TYPE 2 DIABETES MELLITUS WITHOUT COMPLICATION, WITHOUT LONG-TERM CURRENT USE OF INSULIN (HCC): ICD-10-CM

## 2019-11-04 DIAGNOSIS — J20.9 ACUTE BRONCHITIS, UNSPECIFIED ORGANISM: Primary | ICD-10-CM

## 2019-11-04 LAB
FLUAV AG NPH QL: NEGATIVE
FLUBV AG NPH QL IA: NEGATIVE

## 2019-11-04 PROCEDURE — 96372 THER/PROPH/DIAG INJ SC/IM: CPT | Performed by: INTERNAL MEDICINE

## 2019-11-04 PROCEDURE — 87804 INFLUENZA ASSAY W/OPTIC: CPT

## 2019-11-04 PROCEDURE — 71046 X-RAY EXAM CHEST 2 VIEWS: CPT

## 2019-11-04 PROCEDURE — 99214 OFFICE O/P EST MOD 30 MIN: CPT | Performed by: INTERNAL MEDICINE

## 2019-11-04 PROCEDURE — 71046 X-RAY EXAM CHEST 2 VIEWS: CPT | Performed by: RADIOLOGY

## 2019-11-04 RX ORDER — PROMETHAZINE HYDROCHLORIDE AND CODEINE PHOSPHATE 6.25; 1 MG/5ML; MG/5ML
5 SYRUP ORAL EVERY 4 HOURS PRN
Qty: 100 ML | Refills: 0 | Status: SHIPPED | OUTPATIENT
Start: 2019-11-04 | End: 2019-12-16 | Stop reason: SDUPTHER

## 2019-11-04 RX ORDER — CEFTRIAXONE 500 MG/1
500 INJECTION, POWDER, FOR SOLUTION INTRAMUSCULAR; INTRAVENOUS EVERY 24 HOURS
Status: COMPLETED | OUTPATIENT
Start: 2019-11-04 | End: 2019-11-04

## 2019-11-04 RX ORDER — LORATADINE 10 MG/1
10 TABLET ORAL DAILY
Qty: 30 TABLET | Refills: 0 | Status: SHIPPED | OUTPATIENT
Start: 2019-11-04 | End: 2021-01-05

## 2019-11-04 RX ORDER — CEFDINIR 300 MG/1
300 CAPSULE ORAL 2 TIMES DAILY
Qty: 14 CAPSULE | Refills: 0 | Status: SHIPPED | OUTPATIENT
Start: 2019-11-04 | End: 2019-12-16

## 2019-11-04 RX ADMIN — CEFTRIAXONE 500 MG: 500 INJECTION, POWDER, FOR SOLUTION INTRAMUSCULAR; INTRAVENOUS at 10:03

## 2019-11-04 NOTE — PROGRESS NOTES
subjective     Chief Complaint   Patient presents with   • Nasal Congestion     sneezing, eye watering since friday, took tylenol this a.m.   • Migraine   • Fatigue     weak   • Cough     sometimes productive, low grade fever      History of Present Illness  Patient is 64 years old white female who states that she has been having a lot of cough with yellowish sputum.,  Low-grade fever for last 3 days.  She also complains of runny eyes and head congestion.  Blood pressure is mildly elevated today but patient blames it on her symptoms.  She has regular appointment coming up soon she will keep a record of her blood pressure.  Other problems consist of hyperlipidemia diabetes mellitus hypothyroidism she is taking medications regularly and will be assessed next visit.    Past Surgical History:   Procedure Laterality Date   • CHOLECYSTECTOMY     • COLONOSCOPY W/ BIOPSIES  2012   • HYSTERECTOMY     • VARICOSE VEIN SURGERY Right      Family History   Problem Relation Age of Onset   • Thyroid cancer Mother    • Aneurysm Mother    • Hypertension Mother    • Diabetes Mother    • Arthritis Mother    • Heart attack Father    • Heart disease Father    • Diabetes Brother    • Kidney failure Brother    • Hypertension Brother    • Diabetes Brother    • Hypertension Brother    • Breast cancer Maternal Aunt      Past Medical History:   Diagnosis Date   • B12 deficiency    • Bronchitis, acute    • Chronic pain syndrome    • COPD (chronic obstructive pulmonary disease) (CMS/HCC)    • Diabetes mellitus (CMS/HCC)    • Disease of thyroid gland    • Hyperlipidemia    • Hypertension      Patient Active Problem List   Diagnosis   • Acute bronchitis   • B12 deficiency*   • Chronic pain syndrome*   • Hypertension*   • Hyperlipidemia*   • Diabetes mellitus*   • Hypothyroidism*   • Vitamin D deficiency*   • Right ear pain   • Sebaceous cyst   • Chronic bronchitis (CMS/HCC)   • Age-related osteoporosis without current pathological fracture   •  Tobacco use       Social History     Tobacco Use   • Smoking status: Current Every Day Smoker     Packs/day: 0.50     Years: 10.00     Pack years: 5.00     Types: Cigarettes   • Smokeless tobacco: Never Used   Substance Use Topics   • Alcohol use: No   • Drug use: No       Allergies   Allergen Reactions   • Avelox [Moxifloxacin]        Current Outpatient Medications on File Prior to Visit   Medication Sig   • albuterol sulfate  (90 Base) MCG/ACT inhaler Inhale 2 puffs Every 4 (Four) Hours As Needed for Wheezing.   • alendronate (FOSAMAX) 70 MG tablet Take 1 tablet by mouth Every 7 (Seven) Days.   • atorvastatin (LIPITOR) 10 MG tablet Take 1 tablet by mouth Daily.   • Calcium Carb-Cholecalciferol (OS-JOY CALCIUM + D3) 500-200 MG-UNIT tablet Take one PO daily   • Fluticasone Furoate-Vilanterol (BREO ELLIPTA) 100-25 MCG/INH inhaler Inhale 1 puff Daily.   • furosemide (LASIX) 20 MG tablet Take 1 tablet by mouth Daily. (Patient taking differently: Take 20 mg by mouth Daily As Needed.)   • glucose blood test strip 1 each by Other route Daily. Use as instructed   • hydroxychloroquine (PLAQUENIL) 200 MG tablet Take 200 mg by mouth daily.   • levothyroxine (SYNTHROID, LEVOTHROID) 100 MCG tablet TAKE ONE TABLET BY MOUTH EVERY DAY FOR THYROID   • linagliptin (TRADJENTA) 5 MG tablet tablet Take 1 tablet by mouth Daily.   • losartan (COZAAR) 25 MG tablet Take 1 tablet by mouth Daily.   • metFORMIN (GLUCOPHAGE) 1000 MG tablet TAKE ONE TABLET BY MOUTH TWICE A DAY BEFORE MEALS   • metoprolol tartrate (LOPRESSOR) 25 MG tablet TAKE ONE TABLET BY MOUTH EVERY 12 HOURS FOR BLOOD PRESSURE   • montelukast (SINGULAIR) 10 MG tablet Take 1 tablet by mouth Every Night.   • ONE TOUCH LANCETS [facility] [place] USES TWICE A DAY TO CHECK BLOOD SUGAR   • vitamin B-12 (CYANOCOBALAMIN) 500 MCG tablet Take 1,000 mcg by mouth daily.     No current facility-administered medications on file prior to visit.          The following portions of the patient's  "history were reviewed and updated as appropriate: allergies, current medications, past family history, past medical history, past social history, past surgical history and problem list.    Review of Systems   Constitution: Positive for fever, weakness and malaise/fatigue. Negative for chills and night sweats.   HENT: Positive for congestion.         Runny nose and watery eyes   Eyes: Negative.    Cardiovascular: Negative.  Negative for chest pain, cyanosis, dyspnea on exertion, irregular heartbeat, leg swelling, near-syncope, orthopnea, palpitations, paroxysmal nocturnal dyspnea and syncope.   Respiratory: Positive for cough, sputum production and wheezing. Negative for shortness of breath.    Hematologic/Lymphatic: Negative.    Musculoskeletal: Negative.    Gastrointestinal: Negative.    Neurological: Negative for headaches.          Objective:     /90 (BP Location: Left arm, Patient Position: Sitting, Cuff Size: Adult)   Pulse 65   Temp 98.7 °F (37.1 °C)   Ht 160 cm (63\")   Wt 72.6 kg (160 lb)   SpO2 99%   BMI 28.34 kg/m²   Physical Exam   Constitutional: She appears well-developed and well-nourished. No distress.   HENT:   Head: Normocephalic and atraumatic.   Mouth/Throat: Oropharynx is clear and moist. No oropharyngeal exudate.   Eyes: Conjunctivae and EOM are normal. Pupils are equal, round, and reactive to light. No scleral icterus.   Neck: Normal range of motion. Neck supple. No JVD present. No tracheal deviation present. No thyromegaly present.   Cardiovascular: Normal rate, regular rhythm, normal heart sounds and intact distal pulses. PMI is not displaced. Exam reveals no gallop, no friction rub and no decreased pulses.   No murmur heard.  Pulses:       Carotid pulses are 3+ on the right side, and 3+ on the left side.       Radial pulses are 3+ on the right side, and 3+ on the left side.   Pulmonary/Chest: Effort normal and breath sounds normal. No respiratory distress. She has no wheezes. She " has no rales. She exhibits no tenderness.   Patient is having a lot of coughing during examination.  Nose and head congestion noted   Abdominal: Soft. Bowel sounds are normal. She exhibits no distension, no abdominal bruit and no mass. There is no splenomegaly or hepatomegaly. There is no tenderness. There is no rebound and no guarding.   Musculoskeletal: Normal range of motion. She exhibits no edema, tenderness or deformity.   Lymphadenopathy:     She has no cervical adenopathy.   Neurological: She is alert. She has normal reflexes. No cranial nerve deficit. She exhibits normal muscle tone. Coordination normal.   Skin: Skin is warm and dry. No rash noted. She is not diaphoretic. No erythema.   Psychiatric: She has a normal mood and affect. Her behavior is normal. Judgment and thought content normal.         Lab Review  Lab Results   Component Value Date     06/10/2019    K 4.6 06/10/2019     06/10/2019    BUN 9 06/10/2019    CREATININE 0.76 06/10/2019    GLUCOSE 107 (H) 06/10/2019    CALCIUM 9.2 06/10/2019    ALT 10 06/10/2019    ALKPHOS 58 06/10/2019    LABIL2 1.5 04/28/2016     Lab Results   Component Value Date    CKTOTAL 40 06/10/2019     Lab Results   Component Value Date    WBC 9.66 06/10/2019    HGB 12.6 06/10/2019    HCT 40.4 06/10/2019     06/10/2019     No results found for: INR  No results found for: MG  Lab Results   Component Value Date    TSH 0.992 06/10/2019     No results found for: BNP  Lab Results   Component Value Date    CHLPL 184 04/28/2016    CHOL 167 06/10/2019    TRIG 157 (H) 06/10/2019    HDL 42 06/10/2019    VLDL 31.4 06/10/2019    LDLHDL 2.23 06/10/2019     Lab Results   Component Value Date    LDL 94 06/10/2019    LDL 97 12/06/2018       Procedures       I personally viewed and interpreted the patient's LAB data         Assessment:     1. Acute bronchitis, unspecified organism    2. Mixed hyperlipidemia    3. Essential hypertension    4. Hypothyroidism, unspecified  type    5. Type 2 diabetes mellitus without complication, without long-term current use of insulin (CMS/[facility])    6. Tobacco use          Plan:     Patient seems to have upper respiratory tract infection.  She has lot of cough with yellowish expectoration.  Will arrange a chest x-ray and flu antigen.  Patient was given Rocephin and was started on Omnicef twice daily.  She was also given Phenergan with codeine cough syrup and Claritin.    She will continue to monitor her blood pressure and bring record with her.  She was also advised to continue her Lipitor.  She is only taking losartan 25 mg daily.  She was advised to increase losartan depending on her blood pressure readings.  Follow-up scheduled        No Follow-up on file.

## 2019-11-05 ENCOUNTER — TELEPHONE (OUTPATIENT)
Dept: CARDIOLOGY | Facility: CLINIC | Age: 64
End: 2019-11-05

## 2019-11-05 NOTE — TELEPHONE ENCOUNTER
----- Message from Evita Rosario MD sent at 11/4/2019  4:31 PM EST -----  Chest x-ray is clear no pneumonia   no flu  Upper respiratory tract infection with sinus and bronchitis

## 2019-11-21 ENCOUNTER — LAB (OUTPATIENT)
Dept: LAB | Facility: HOSPITAL | Age: 64
End: 2019-11-21

## 2019-11-21 DIAGNOSIS — E11.9 TYPE 2 DIABETES MELLITUS WITHOUT COMPLICATION, WITHOUT LONG-TERM CURRENT USE OF INSULIN (HCC): ICD-10-CM

## 2019-11-21 DIAGNOSIS — E03.9 HYPOTHYROIDISM, UNSPECIFIED TYPE: ICD-10-CM

## 2019-11-21 DIAGNOSIS — E78.2 MIXED HYPERLIPIDEMIA: ICD-10-CM

## 2019-11-21 LAB
ALBUMIN SERPL-MCNC: 3.9 G/DL (ref 3.5–5.2)
ALBUMIN UR-MCNC: 2.2 MG/DL
ALBUMIN/GLOB SERPL: 1.1 G/DL
ALP SERPL-CCNC: 66 U/L (ref 39–117)
ALT SERPL W P-5'-P-CCNC: 10 U/L (ref 1–33)
ANION GAP SERPL CALCULATED.3IONS-SCNC: 12 MMOL/L (ref 5–15)
AST SERPL-CCNC: 10 U/L (ref 1–32)
BASOPHILS # BLD AUTO: 0.06 10*3/MM3 (ref 0–0.2)
BASOPHILS NFR BLD AUTO: 0.7 % (ref 0–1.5)
BILIRUB SERPL-MCNC: 0.2 MG/DL (ref 0.2–1.2)
BUN BLD-MCNC: 11 MG/DL (ref 8–23)
BUN/CREAT SERPL: 15.5 (ref 7–25)
CALCIUM SPEC-SCNC: 9.2 MG/DL (ref 8.6–10.5)
CHLORIDE SERPL-SCNC: 102 MMOL/L (ref 98–107)
CHOLEST SERPL-MCNC: 172 MG/DL (ref 0–200)
CK SERPL-CCNC: 33 U/L (ref 20–180)
CO2 SERPL-SCNC: 28 MMOL/L (ref 22–29)
CREAT BLD-MCNC: 0.71 MG/DL (ref 0.57–1)
DEPRECATED RDW RBC AUTO: 43.8 FL (ref 37–54)
EOSINOPHIL # BLD AUTO: 0.34 10*3/MM3 (ref 0–0.4)
EOSINOPHIL NFR BLD AUTO: 3.8 % (ref 0.3–6.2)
ERYTHROCYTE [DISTWIDTH] IN BLOOD BY AUTOMATED COUNT: 12.7 % (ref 12.3–15.4)
GFR SERPL CREATININE-BSD FRML MDRD: 83 ML/MIN/1.73
GLOBULIN UR ELPH-MCNC: 3.4 GM/DL
GLUCOSE BLD-MCNC: 120 MG/DL (ref 65–99)
HBA1C MFR BLD: 6 % (ref 4.8–5.6)
HCT VFR BLD AUTO: 40.1 % (ref 34–46.6)
HDLC SERPL-MCNC: 42 MG/DL (ref 40–60)
HGB BLD-MCNC: 13.5 G/DL (ref 12–15.9)
IMM GRANULOCYTES # BLD AUTO: 0.04 10*3/MM3 (ref 0–0.05)
IMM GRANULOCYTES NFR BLD AUTO: 0.4 % (ref 0–0.5)
LDLC SERPL CALC-MCNC: 94 MG/DL (ref 0–100)
LDLC/HDLC SERPL: 2.25 {RATIO}
LYMPHOCYTES # BLD AUTO: 2.42 10*3/MM3 (ref 0.7–3.1)
LYMPHOCYTES NFR BLD AUTO: 26.9 % (ref 19.6–45.3)
MCH RBC QN AUTO: 31.9 PG (ref 26.6–33)
MCHC RBC AUTO-ENTMCNC: 33.7 G/DL (ref 31.5–35.7)
MCV RBC AUTO: 94.8 FL (ref 79–97)
MONOCYTES # BLD AUTO: 0.62 10*3/MM3 (ref 0.1–0.9)
MONOCYTES NFR BLD AUTO: 6.9 % (ref 5–12)
NEUTROPHILS # BLD AUTO: 5.5 10*3/MM3 (ref 1.7–7)
NEUTROPHILS NFR BLD AUTO: 61.3 % (ref 42.7–76)
NRBC BLD AUTO-RTO: 0 /100 WBC (ref 0–0.2)
PLATELET # BLD AUTO: 332 10*3/MM3 (ref 140–450)
PMV BLD AUTO: 11.3 FL (ref 6–12)
POTASSIUM BLD-SCNC: 4.7 MMOL/L (ref 3.5–5.2)
PROT SERPL-MCNC: 7.3 G/DL (ref 6–8.5)
RBC # BLD AUTO: 4.23 10*6/MM3 (ref 3.77–5.28)
SODIUM BLD-SCNC: 142 MMOL/L (ref 136–145)
T4 FREE SERPL-MCNC: 1.89 NG/DL (ref 0.93–1.7)
TRIGL SERPL-MCNC: 178 MG/DL (ref 0–150)
TSH SERPL DL<=0.05 MIU/L-ACNC: 0.8 UIU/ML (ref 0.27–4.2)
VLDLC SERPL-MCNC: 35.6 MG/DL (ref 5–40)
WBC NRBC COR # BLD: 8.98 10*3/MM3 (ref 3.4–10.8)

## 2019-11-21 PROCEDURE — 80061 LIPID PANEL: CPT

## 2019-11-21 PROCEDURE — 82043 UR ALBUMIN QUANTITATIVE: CPT

## 2019-11-21 PROCEDURE — 36415 COLL VENOUS BLD VENIPUNCTURE: CPT

## 2019-11-21 PROCEDURE — 85025 COMPLETE CBC W/AUTO DIFF WBC: CPT

## 2019-11-21 PROCEDURE — 84439 ASSAY OF FREE THYROXINE: CPT

## 2019-11-21 PROCEDURE — 84443 ASSAY THYROID STIM HORMONE: CPT

## 2019-11-21 PROCEDURE — 83036 HEMOGLOBIN GLYCOSYLATED A1C: CPT

## 2019-11-21 PROCEDURE — 82550 ASSAY OF CK (CPK): CPT

## 2019-11-21 PROCEDURE — 80053 COMPREHEN METABOLIC PANEL: CPT

## 2019-11-22 ENCOUNTER — TELEPHONE (OUTPATIENT)
Dept: CARDIOLOGY | Facility: CLINIC | Age: 64
End: 2019-11-22

## 2019-11-22 RX ORDER — LEVOTHYROXINE SODIUM 88 UG/1
88 TABLET ORAL DAILY
Qty: 90 TABLET | Refills: 3 | Status: SHIPPED | OUTPATIENT
Start: 2019-11-22 | End: 2020-07-08 | Stop reason: SDUPTHER

## 2019-11-22 NOTE — TELEPHONE ENCOUNTER
----- Message from Evita Rosario MD sent at 11/22/2019 10:11 AM EST -----  Thyroid is little bit high.  We need to cut the thyroid down to 88 mcg daily  Call in the new prescription

## 2019-11-25 RX ORDER — LOSARTAN POTASSIUM 25 MG/1
TABLET ORAL
Qty: 90 TABLET | Refills: 3 | Status: SHIPPED | OUTPATIENT
Start: 2019-11-25 | End: 2020-07-08 | Stop reason: SDUPTHER

## 2019-12-09 DIAGNOSIS — J43.8 OTHER EMPHYSEMA (HCC): ICD-10-CM

## 2019-12-09 DIAGNOSIS — E03.8 OTHER SPECIFIED HYPOTHYROIDISM: ICD-10-CM

## 2019-12-09 DIAGNOSIS — I10 ESSENTIAL HYPERTENSION: ICD-10-CM

## 2019-12-09 RX ORDER — LINAGLIPTIN 5 MG/1
TABLET, FILM COATED ORAL
Qty: 90 TABLET | Refills: 3 | Status: SHIPPED | OUTPATIENT
Start: 2019-12-09 | End: 2020-07-08 | Stop reason: SDUPTHER

## 2019-12-16 ENCOUNTER — TELEPHONE (OUTPATIENT)
Dept: CARDIOLOGY | Facility: CLINIC | Age: 64
End: 2019-12-16

## 2019-12-16 ENCOUNTER — LAB (OUTPATIENT)
Dept: LAB | Facility: HOSPITAL | Age: 64
End: 2019-12-16

## 2019-12-16 ENCOUNTER — OFFICE VISIT (OUTPATIENT)
Dept: CARDIOLOGY | Facility: CLINIC | Age: 64
End: 2019-12-16

## 2019-12-16 VITALS
OXYGEN SATURATION: 96 % | TEMPERATURE: 98.6 F | DIASTOLIC BLOOD PRESSURE: 62 MMHG | HEIGHT: 63 IN | SYSTOLIC BLOOD PRESSURE: 122 MMHG | HEART RATE: 77 BPM | BODY MASS INDEX: 27.82 KG/M2 | WEIGHT: 157 LBS

## 2019-12-16 DIAGNOSIS — J20.9 ACUTE BRONCHITIS, UNSPECIFIED ORGANISM: ICD-10-CM

## 2019-12-16 DIAGNOSIS — Z72.0 TOBACCO USE: ICD-10-CM

## 2019-12-16 DIAGNOSIS — I10 ESSENTIAL HYPERTENSION: ICD-10-CM

## 2019-12-16 DIAGNOSIS — E78.2 MIXED HYPERLIPIDEMIA: ICD-10-CM

## 2019-12-16 DIAGNOSIS — E03.9 HYPOTHYROIDISM, UNSPECIFIED TYPE: ICD-10-CM

## 2019-12-16 DIAGNOSIS — J20.9 ACUTE BRONCHITIS, UNSPECIFIED ORGANISM: Primary | ICD-10-CM

## 2019-12-16 LAB
FLUAV AG NPH QL: NEGATIVE
FLUBV AG NPH QL IA: NEGATIVE

## 2019-12-16 PROCEDURE — 99214 OFFICE O/P EST MOD 30 MIN: CPT | Performed by: INTERNAL MEDICINE

## 2019-12-16 PROCEDURE — 87804 INFLUENZA ASSAY W/OPTIC: CPT

## 2019-12-16 PROCEDURE — 96372 THER/PROPH/DIAG INJ SC/IM: CPT | Performed by: INTERNAL MEDICINE

## 2019-12-16 RX ORDER — AZITHROMYCIN 250 MG/1
TABLET, FILM COATED ORAL
Qty: 6 TABLET | Refills: 0 | Status: SHIPPED | OUTPATIENT
Start: 2019-12-16 | End: 2020-04-08

## 2019-12-16 RX ORDER — PROMETHAZINE HYDROCHLORIDE AND CODEINE PHOSPHATE 6.25; 1 MG/5ML; MG/5ML
5 SYRUP ORAL EVERY 6 HOURS PRN
Qty: 100 ML | Refills: 0 | Status: SHIPPED | OUTPATIENT
Start: 2019-12-16 | End: 2020-04-08

## 2019-12-16 RX ORDER — CEFTRIAXONE 500 MG/1
500 INJECTION, POWDER, FOR SOLUTION INTRAMUSCULAR; INTRAVENOUS ONCE
Status: COMPLETED | OUTPATIENT
Start: 2019-12-16 | End: 2019-12-16

## 2019-12-16 RX ORDER — TRIAMCINOLONE ACETONIDE 40 MG/ML
40 INJECTION, SUSPENSION INTRA-ARTICULAR; INTRAMUSCULAR ONCE
Status: COMPLETED | OUTPATIENT
Start: 2019-12-16 | End: 2019-12-16

## 2019-12-16 RX ADMIN — TRIAMCINOLONE ACETONIDE 40 MG: 40 INJECTION, SUSPENSION INTRA-ARTICULAR; INTRAMUSCULAR at 15:49

## 2019-12-16 RX ADMIN — CEFTRIAXONE 500 MG: 500 INJECTION, POWDER, FOR SOLUTION INTRAMUSCULAR; INTRAVENOUS at 15:47

## 2019-12-16 NOTE — TELEPHONE ENCOUNTER
----- Message from Evita Rosario MD sent at 12/16/2019  4:10 PM EST -----  Flu antigen is negative

## 2019-12-16 NOTE — PROGRESS NOTES
"subjective     Chief Complaint   Patient presents with   • URI     since last Thursday   • Cough     productive, \"just broke fever over the wknd\"   • Diarrhea     x 4 days   • Results     labs     History of Present Illness  Patient is 64 years old white female who states that she has been having upper respiratory tract infection for last 5 days.  She had fever for first 2 days which is gone now.  She has lot of dry cough.  She also complains of diarrhea which is getting better.  No body aches.    She is doing very well with rest of her medical problems which consist of hyperlipidemia, diabetes mellitus, hypothyroidism and hypertension.    Patient has significant COPD but unfortunately continues to use tobacco.  She is taking breathing treatments.    Past Surgical History:   Procedure Laterality Date   • CHOLECYSTECTOMY     • COLONOSCOPY W/ BIOPSIES  2012   • HYSTERECTOMY     • VARICOSE VEIN SURGERY Right      Family History   Problem Relation Age of Onset   • Thyroid cancer Mother    • Aneurysm Mother    • Hypertension Mother    • Diabetes Mother    • Arthritis Mother    • Heart attack Father    • Heart disease Father    • Diabetes Brother    • Kidney failure Brother    • Hypertension Brother    • Diabetes Brother    • Hypertension Brother    • Breast cancer Maternal Aunt      Past Medical History:   Diagnosis Date   • B12 deficiency    • Bronchitis, acute    • Chronic pain syndrome    • COPD (chronic obstructive pulmonary disease) (CMS/HCC)    • Diabetes mellitus (CMS/HCC)    • Disease of thyroid gland    • Hyperlipidemia    • Hypertension      Patient Active Problem List   Diagnosis   • Acute bronchitis   • B12 deficiency*   • Chronic pain syndrome*   • Hypertension*   • Hyperlipidemia*   • Diabetes mellitus*   • Hypothyroidism*   • Vitamin D deficiency*   • Right ear pain   • Sebaceous cyst   • Chronic bronchitis (CMS/HCC)   • Age-related osteoporosis without current pathological fracture   • Tobacco use "       Social History     Tobacco Use   • Smoking status: Current Every Day Smoker     Packs/day: 0.50     Years: 10.00     Pack years: 5.00     Types: Cigarettes   • Smokeless tobacco: Never Used   Substance Use Topics   • Alcohol use: No   • Drug use: No       Allergies   Allergen Reactions   • Avelox [Moxifloxacin] Hives     Burning itcing whelps allover       Current Outpatient Medications on File Prior to Visit   Medication Sig   • albuterol sulfate  (90 Base) MCG/ACT inhaler Inhale 2 puffs Every 4 (Four) Hours As Needed for Wheezing.   • alendronate (FOSAMAX) 70 MG tablet Take 1 tablet by mouth Every 7 (Seven) Days.   • atorvastatin (LIPITOR) 10 MG tablet Take 1 tablet by mouth Daily.   • Calcium Carb-Cholecalciferol (OS-JOY CALCIUM + D3) 500-200 MG-UNIT tablet Take one PO daily   • Fluticasone Furoate-Vilanterol (BREO ELLIPTA) 100-25 MCG/INH inhaler Inhale 1 puff Daily.   • furosemide (LASIX) 20 MG tablet Take 1 tablet by mouth Daily. (Patient taking differently: Take 20 mg by mouth Daily As Needed.)   • glucose blood test strip 1 each by Other route Daily. Use as instructed   • hydroxychloroquine (PLAQUENIL) 200 MG tablet Take 200 mg by mouth daily.   • levothyroxine (SYNTHROID) 88 MCG tablet Take 1 tablet by mouth Daily.   • loratadine (CLARITIN) 10 MG tablet Take 1 tablet by mouth Daily.   • losartan (COZAAR) 25 MG tablet TAKE ONE TABLET BY MOUTH EVERY DAY FOR BLOOD PRESSURE   • metFORMIN (GLUCOPHAGE) 1000 MG tablet TAKE ONE TABLET BY MOUTH TWICE A DAY BEFORE MEALS   • metoprolol tartrate (LOPRESSOR) 25 MG tablet TAKE ONE TABLET BY MOUTH EVERY 12 HOURS FOR BLOOD PRESSURE   • montelukast (SINGULAIR) 10 MG tablet Take 1 tablet by mouth Every Night.   • ONE TOUCH LANCETS [facility] [place] USES TWICE A DAY TO CHECK BLOOD SUGAR   • TRADJENTA 5 MG tablet tablet TAKE ONE TABLET BY MOUTH EVERY DAY FOR BLOOD SUGAR   • vitamin B-12 (CYANOCOBALAMIN) 500 MCG tablet Take 1,000 mcg by mouth daily.   • [DISCONTINUED]  "promethazine-codeine (PHENERGAN with CODEINE) 6.25-10 MG/5ML syrup Take 5 mL by mouth Every 4 (Four) Hours As Needed for Cough.   • [DISCONTINUED] cefdinir (OMNICEF) 300 MG capsule Take 1 capsule by mouth 2 (Two) Times a Day.     No current facility-administered medications on file prior to visit.          The following portions of the patient's history were reviewed and updated as appropriate: allergies, current medications, past family history, past medical history, past social history, past surgical history and problem list.    Review of Systems   Constitution: Negative. Negative for fever.   HENT: Negative.  Negative for congestion.    Eyes: Negative.    Cardiovascular: Negative.  Negative for chest pain, cyanosis, dyspnea on exertion, irregular heartbeat, leg swelling, near-syncope, orthopnea, palpitations, paroxysmal nocturnal dyspnea and syncope.   Respiratory: Positive for cough, shortness of breath and wheezing. Negative for sputum production.    Hematologic/Lymphatic: Negative.    Musculoskeletal: Negative.    Gastrointestinal: Negative.    Neurological: Negative.  Negative for headaches.          Objective:     /62 (BP Location: Left arm, Patient Position: Sitting, Cuff Size: Adult)   Pulse 77   Temp 98.6 °F (37 °C)   Ht 160 cm (63\")   Wt 71.2 kg (157 lb)   SpO2 96%   BMI 27.81 kg/m²   Physical Exam   Constitutional: She appears well-developed and well-nourished. No distress.   HENT:   Head: Normocephalic and atraumatic.   Mouth/Throat: Oropharynx is clear and moist. No oropharyngeal exudate.   Throat is injected with postnasal drip   Eyes: Pupils are equal, round, and reactive to light. Conjunctivae and EOM are normal. No scleral icterus.   Neck: Normal range of motion. Neck supple. No JVD present. No tracheal deviation present. No thyromegaly present.   Cardiovascular: Normal rate, regular rhythm, normal heart sounds and intact distal pulses. PMI is not displaced. Exam reveals no gallop, no " friction rub and no decreased pulses.   No murmur heard.  Pulses:       Carotid pulses are 3+ on the right side, and 3+ on the left side.       Radial pulses are 3+ on the right side, and 3+ on the left side.   Pulmonary/Chest: Effort normal. No respiratory distress. She has wheezes. She has no rales. She exhibits no tenderness.   Abdominal: Soft. Bowel sounds are normal. She exhibits no distension, no abdominal bruit and no mass. There is no splenomegaly or hepatomegaly. There is no tenderness. There is no rebound and no guarding.   Musculoskeletal: Normal range of motion. She exhibits no edema, tenderness or deformity.   Lymphadenopathy:     She has no cervical adenopathy.   Neurological: She is alert. She has normal reflexes. No cranial nerve deficit. She exhibits normal muscle tone. Coordination normal.   Skin: Skin is warm and dry. No rash noted. She is not diaphoretic. No erythema.   Psychiatric: She has a normal mood and affect. Her behavior is normal. Judgment and thought content normal.         Lab Review  Lab Results   Component Value Date     11/21/2019    K 4.7 11/21/2019     11/21/2019    BUN 11 11/21/2019    CREATININE 0.71 11/21/2019    GLUCOSE 120 (H) 11/21/2019    CALCIUM 9.2 11/21/2019    ALT 10 11/21/2019    ALKPHOS 66 11/21/2019    LABIL2 1.5 04/28/2016     Lab Results   Component Value Date    CKTOTAL 33 11/21/2019     Lab Results   Component Value Date    WBC 8.98 11/21/2019    HGB 13.5 11/21/2019    HCT 40.1 11/21/2019     11/21/2019     No results found for: INR  No results found for: MG  Lab Results   Component Value Date    TSH 0.797 11/21/2019     No results found for: BNP  Lab Results   Component Value Date    CHLPL 184 04/28/2016    CHOL 172 11/21/2019    TRIG 178 (H) 11/21/2019    HDL 42 11/21/2019    VLDL 35.6 11/21/2019    LDLHDL 2.25 11/21/2019     Lab Results   Component Value Date    LDL 94 11/21/2019    LDL 94 06/10/2019       Procedures       I personally  viewed and interpreted the patient's LAB data         Assessment:     1. Acute bronchitis, unspecified organism    2. Tobacco use    3. Hypothyroidism, unspecified type    4. Mixed hyperlipidemia    5. Essential hypertension          Plan:     Patient was given injection Rocephin for acute bronchitis and Kenalog for COPD with acute exacerbation.  She was started on Z-Lukasz  She was given Phenergan with codeine cough syrup  She was advised to take Claritin also.  She will have rapid flu antigen screen today.  She will continue rest of her medications.  Follow-up scheduled        No follow-ups on file.

## 2020-01-06 RX ORDER — MONTELUKAST SODIUM 10 MG/1
10 TABLET ORAL NIGHTLY
Qty: 90 TABLET | Refills: 3 | Status: SHIPPED | OUTPATIENT
Start: 2020-01-06 | End: 2020-07-08 | Stop reason: SDUPTHER

## 2020-03-16 ENCOUNTER — TELEPHONE (OUTPATIENT)
Dept: CARDIOLOGY | Facility: CLINIC | Age: 65
End: 2020-03-16

## 2020-03-16 RX ORDER — CEFDINIR 300 MG/1
300 CAPSULE ORAL DAILY
Qty: 7 CAPSULE | Refills: 0 | Status: SHIPPED | OUTPATIENT
Start: 2020-03-16 | End: 2020-04-08

## 2020-03-16 NOTE — TELEPHONE ENCOUNTER
Spoke with betzaida and given message per Dr. Rosario.  He stated no fever but would give Ada the message.

## 2020-03-16 NOTE — TELEPHONE ENCOUNTER
Omnicef 300 twice daily for 7 days.  Make sure there is no contact with coronavirus.  If she gets fever then she will need to go to the emergency room

## 2020-04-08 ENCOUNTER — OFFICE VISIT (OUTPATIENT)
Dept: CARDIOLOGY | Facility: CLINIC | Age: 65
End: 2020-04-08

## 2020-04-08 DIAGNOSIS — I10 ESSENTIAL HYPERTENSION: Primary | ICD-10-CM

## 2020-04-08 DIAGNOSIS — E11.9 TYPE 2 DIABETES MELLITUS WITHOUT COMPLICATION, WITHOUT LONG-TERM CURRENT USE OF INSULIN (HCC): ICD-10-CM

## 2020-04-08 DIAGNOSIS — E78.2 MIXED HYPERLIPIDEMIA: ICD-10-CM

## 2020-04-08 DIAGNOSIS — E03.9 HYPOTHYROIDISM, UNSPECIFIED TYPE: ICD-10-CM

## 2020-04-08 DIAGNOSIS — Z72.0 TOBACCO USE: ICD-10-CM

## 2020-04-08 PROCEDURE — 99213 OFFICE O/P EST LOW 20 MIN: CPT | Performed by: INTERNAL MEDICINE

## 2020-04-08 RX ORDER — ATORVASTATIN CALCIUM 10 MG/1
10 TABLET, FILM COATED ORAL DAILY
Qty: 90 TABLET | Refills: 3 | Status: SHIPPED | OUTPATIENT
Start: 2020-04-08 | End: 2020-07-08 | Stop reason: SDUPTHER

## 2020-04-08 NOTE — PROGRESS NOTES
subjective     Chief Complaint   Patient presents with   • Hypertension     Follow up, states b/p doing well   • Hypothyroidism     Follow up   • Diabetes     states has been running normal / 119 today     You have chosen to receive care through a telephone visit today. Do you consent to use a telephone visit for your medical care today? Yes    Patient is 64 years old white female who is being evaluated via telephone.  She has essential hypertension, which is very well controlled with current medication.  No drug side effects.  She also has hypothyroidism on Synthroid replacement therapy denies any palpitations.  Diabetes is controlled with medications.  Lab work scheduled for next visit.  Patient also has hyperlipidemia on Lipitor therapy  Unfortunately patient continues to use tobacco.  She has COPD and environmental allergies taking Brio Ellipta and Proventil HFA.      Past Surgical History:   Procedure Laterality Date   • CHOLECYSTECTOMY     • COLONOSCOPY W/ BIOPSIES  2012   • HYSTERECTOMY     • VARICOSE VEIN SURGERY Right      Family History   Problem Relation Age of Onset   • Thyroid cancer Mother    • Aneurysm Mother    • Hypertension Mother    • Diabetes Mother    • Arthritis Mother    • Heart attack Father    • Heart disease Father    • Diabetes Brother    • Kidney failure Brother    • Hypertension Brother    • Diabetes Brother    • Hypertension Brother    • Breast cancer Maternal Aunt      Past Medical History:   Diagnosis Date   • B12 deficiency    • Bronchitis, acute    • Chronic pain syndrome    • COPD (chronic obstructive pulmonary disease) (CMS/HCC)    • Diabetes mellitus (CMS/HCC)    • Disease of thyroid gland    • Hyperlipidemia    • Hypertension      Patient Active Problem List   Diagnosis   • Acute bronchitis   • B12 deficiency*   • Chronic pain syndrome*   • Hypertension*   • Hyperlipidemia*   • Diabetes mellitus*   • Hypothyroidism*   • Vitamin D deficiency*   • Right ear pain   • Sebaceous cyst    • Chronic bronchitis (CMS/HCC)   • Age-related osteoporosis without current pathological fracture   • Tobacco use       Social History     Tobacco Use   • Smoking status: Current Every Day Smoker     Packs/day: 0.50     Years: 10.00     Pack years: 5.00     Types: Cigarettes   • Smokeless tobacco: Never Used   Substance Use Topics   • Alcohol use: No   • Drug use: No       Allergies   Allergen Reactions   • Avelox [Moxifloxacin] Hives     Burning itcing whelps allover       Current Outpatient Medications on File Prior to Visit   Medication Sig   • albuterol sulfate  (90 Base) MCG/ACT inhaler Inhale 2 puffs Every 4 (Four) Hours As Needed for Wheezing.   • Calcium Carb-Cholecalciferol (OS-JOY CALCIUM + D3) 500-200 MG-UNIT tablet Take one PO daily   • Fluticasone Furoate-Vilanterol (BREO ELLIPTA) 100-25 MCG/INH inhaler Inhale 1 puff Daily.   • furosemide (LASIX) 20 MG tablet Take 1 tablet by mouth Daily. (Patient taking differently: Take 20 mg by mouth Daily As Needed.)   • glucose blood test strip 1 each by Other route Daily. Use as instructed   • hydroxychloroquine (PLAQUENIL) 200 MG tablet Take 200 mg by mouth daily.   • levothyroxine (SYNTHROID) 88 MCG tablet Take 1 tablet by mouth Daily.   • loratadine (CLARITIN) 10 MG tablet Take 1 tablet by mouth Daily.   • losartan (COZAAR) 25 MG tablet TAKE ONE TABLET BY MOUTH EVERY DAY FOR BLOOD PRESSURE   • metFORMIN (GLUCOPHAGE) 1000 MG tablet TAKE ONE TABLET BY MOUTH TWICE A DAY BEFORE MEALS   • metoprolol tartrate (LOPRESSOR) 25 MG tablet TAKE ONE TABLET BY MOUTH EVERY 12 HOURS FOR BLOOD PRESSURE   • montelukast (SINGULAIR) 10 MG tablet Take 1 tablet by mouth Every Night.   • ONE TOUCH LANCETS [facility] [place] USES TWICE A DAY TO CHECK BLOOD SUGAR   • TRADJENTA 5 MG tablet tablet TAKE ONE TABLET BY MOUTH EVERY DAY FOR BLOOD SUGAR   • [DISCONTINUED] atorvastatin (LIPITOR) 10 MG tablet Take 1 tablet by mouth Daily.   • [DISCONTINUED] alendronate (FOSAMAX) 70 MG tablet Take 1  tablet by mouth Every 7 (Seven) Days.   • [DISCONTINUED] azithromycin (ZITHROMAX) 250 MG tablet Take 2 tablets the first day, then 1 tablet daily for 4 days.   • [DISCONTINUED] cefdinir (OMNICEF) 300 MG capsule Take 1 capsule by mouth Daily.   • [DISCONTINUED] promethazine-codeine (PHENERGAN with CODEINE) 6.25-10 MG/5ML syrup Take 5 mL by mouth Every 6 (Six) Hours As Needed for Cough.   • [DISCONTINUED] vitamin B-12 (CYANOCOBALAMIN) 500 MCG tablet Take 1,000 mcg by mouth daily.     No current facility-administered medications on file prior to visit.          The following portions of the patient's history were reviewed and updated as appropriate: allergies, current medications, past family history, past medical history, past social history, past surgical history and problem list.    Review of Systems   Constitution: Negative.   HENT: Negative.  Negative for congestion.    Eyes: Negative.    Cardiovascular: Negative.  Negative for chest pain, cyanosis, dyspnea on exertion, irregular heartbeat, leg swelling, near-syncope, orthopnea, palpitations, paroxysmal nocturnal dyspnea and syncope.   Respiratory: Negative.  Negative for shortness of breath.    Hematologic/Lymphatic: Negative.    Musculoskeletal: Negative.    Gastrointestinal: Negative.    Neurological: Negative.  Negative for headaches.          Objective:     There were no vitals taken for this visit.  Physical Exam   Constitutional:   Not done         Lab Review  Lab Results   Component Value Date     11/21/2019    K 4.7 11/21/2019     11/21/2019    BUN 11 11/21/2019    CREATININE 0.71 11/21/2019    GLUCOSE 120 (H) 11/21/2019    CALCIUM 9.2 11/21/2019    ALT 10 11/21/2019    ALKPHOS 66 11/21/2019    LABIL2 1.5 04/28/2016     Lab Results   Component Value Date    CKTOTAL 33 11/21/2019     Lab Results   Component Value Date    WBC 8.98 11/21/2019    HGB 13.5 11/21/2019    HCT 40.1 11/21/2019     11/21/2019     No results found for: INR  No  results found for: MG  Lab Results   Component Value Date    TSH 0.797 11/21/2019     No results found for: BNP  Lab Results   Component Value Date    CHLPL 184 04/28/2016    CHOL 172 11/21/2019    TRIG 178 (H) 11/21/2019    HDL 42 11/21/2019    VLDL 35.6 11/21/2019    LDLHDL 2.25 11/21/2019     Lab Results   Component Value Date    LDL 94 11/21/2019    LDL 94 06/10/2019       Procedures       I personally viewed and interpreted the patient's LAB data         Assessment:     1. Essential hypertension    2. Mixed hyperlipidemia    3. Tobacco use    4. Type 2 diabetes mellitus without complication, without long-term current use of insulin (CMS/[facility])    5. Hypothyroidism, unspecified type          Plan:     Blood pressure is very well controlled patient will continue current medications  Diabetic control is also good she will continue Tradjenta and metformin  Synthroid was continued lab work will be checked next visit.  Patient has hyperlipidemia Lipitor was continued patient has no drug side effects.  Patient has mild COPD and environmental allergies Singulair and albuterol was continued along with Breo Ellipta.  Follow-up scheduled  Cessation of tobacco use stressed.  Lab work next visit  This visit has been rescheduled as a phone visit to comply with patient safety concerns in accordance with CDC recommendations. Total time of discussion was 15 minutes.          No follow-ups on file.

## 2020-06-12 ENCOUNTER — TRANSCRIBE ORDERS (OUTPATIENT)
Dept: ADMINISTRATIVE | Facility: HOSPITAL | Age: 65
End: 2020-06-12

## 2020-06-12 DIAGNOSIS — Z01.818 PREOP EXAMINATION: Primary | ICD-10-CM

## 2020-06-15 ENCOUNTER — LAB (OUTPATIENT)
Dept: LAB | Facility: HOSPITAL | Age: 65
End: 2020-06-15

## 2020-06-15 ENCOUNTER — HOSPITAL ENCOUNTER (OUTPATIENT)
Dept: MAMMOGRAPHY | Facility: HOSPITAL | Age: 65
Discharge: HOME | End: 2020-06-15
Admitting: INTERNAL MEDICINE

## 2020-06-15 DIAGNOSIS — E03.9 HYPOTHYROIDISM, UNSPECIFIED TYPE: ICD-10-CM

## 2020-06-15 DIAGNOSIS — Z12.31 VISIT FOR SCREENING MAMMOGRAM: ICD-10-CM

## 2020-06-15 DIAGNOSIS — Z01.818 PREOP EXAMINATION: ICD-10-CM

## 2020-06-15 DIAGNOSIS — E11.9 TYPE 2 DIABETES MELLITUS WITHOUT COMPLICATION, WITHOUT LONG-TERM CURRENT USE OF INSULIN (HCC): ICD-10-CM

## 2020-06-15 DIAGNOSIS — E78.2 MIXED HYPERLIPIDEMIA: ICD-10-CM

## 2020-06-15 LAB
ALBUMIN SERPL-MCNC: 4.2 G/DL (ref 3.5–5.2)
ALBUMIN/GLOB SERPL: 1.8 G/DL
ALP SERPL-CCNC: 55 U/L (ref 39–117)
ALT SERPL W P-5'-P-CCNC: 8 U/L (ref 1–33)
ANION GAP SERPL CALCULATED.3IONS-SCNC: 10.5 MMOL/L (ref 5–15)
AST SERPL-CCNC: 10 U/L (ref 1–32)
BASOPHILS # BLD AUTO: 0.04 10*3/MM3 (ref 0–0.2)
BASOPHILS NFR BLD AUTO: 0.4 % (ref 0–1.5)
BILIRUB SERPL-MCNC: <0.2 MG/DL (ref 0.2–1.2)
BUN BLD-MCNC: 15 MG/DL (ref 8–23)
BUN/CREAT SERPL: 22.1 (ref 7–25)
CALCIUM SPEC-SCNC: 8.7 MG/DL (ref 8.6–10.5)
CHLORIDE SERPL-SCNC: 103 MMOL/L (ref 98–107)
CHOLEST SERPL-MCNC: 176 MG/DL (ref 0–200)
CK SERPL-CCNC: 32 U/L (ref 20–180)
CO2 SERPL-SCNC: 26.5 MMOL/L (ref 22–29)
CREAT BLD-MCNC: 0.68 MG/DL (ref 0.57–1)
DEPRECATED RDW RBC AUTO: 46.1 FL (ref 37–54)
EOSINOPHIL # BLD AUTO: 0.14 10*3/MM3 (ref 0–0.4)
EOSINOPHIL NFR BLD AUTO: 1.6 % (ref 0.3–6.2)
ERYTHROCYTE [DISTWIDTH] IN BLOOD BY AUTOMATED COUNT: 13.4 % (ref 12.3–15.4)
GFR SERPL CREATININE-BSD FRML MDRD: 87 ML/MIN/1.73
GLOBULIN UR ELPH-MCNC: 2.4 GM/DL
GLUCOSE BLD-MCNC: 127 MG/DL (ref 65–99)
HBA1C MFR BLD: 5.92 % (ref 4.8–5.6)
HCT VFR BLD AUTO: 37.9 % (ref 34–46.6)
HDLC SERPL-MCNC: 43 MG/DL (ref 40–60)
HGB BLD-MCNC: 12.9 G/DL (ref 12–15.9)
IMM GRANULOCYTES # BLD AUTO: 0.03 10*3/MM3 (ref 0–0.05)
IMM GRANULOCYTES NFR BLD AUTO: 0.3 % (ref 0–0.5)
LDLC SERPL CALC-MCNC: 98 MG/DL (ref 0–100)
LDLC/HDLC SERPL: 2.28 {RATIO}
LYMPHOCYTES # BLD AUTO: 1.64 10*3/MM3 (ref 0.7–3.1)
LYMPHOCYTES NFR BLD AUTO: 18.4 % (ref 19.6–45.3)
MCH RBC QN AUTO: 32.3 PG (ref 26.6–33)
MCHC RBC AUTO-ENTMCNC: 34 G/DL (ref 31.5–35.7)
MCV RBC AUTO: 94.8 FL (ref 79–97)
MONOCYTES # BLD AUTO: 0.61 10*3/MM3 (ref 0.1–0.9)
MONOCYTES NFR BLD AUTO: 6.8 % (ref 5–12)
NEUTROPHILS # BLD AUTO: 6.45 10*3/MM3 (ref 1.7–7)
NEUTROPHILS NFR BLD AUTO: 72.5 % (ref 42.7–76)
NRBC BLD AUTO-RTO: 0 /100 WBC (ref 0–0.2)
PLATELET # BLD AUTO: 304 10*3/MM3 (ref 140–450)
PMV BLD AUTO: 10.7 FL (ref 6–12)
POTASSIUM BLD-SCNC: 4.7 MMOL/L (ref 3.5–5.2)
PROT SERPL-MCNC: 6.6 G/DL (ref 6–8.5)
RBC # BLD AUTO: 4 10*6/MM3 (ref 3.77–5.28)
REF LAB TEST METHOD: NORMAL
SARS-COV-2 RNA RESP QL NAA+PROBE: NOT DETECTED
SODIUM BLD-SCNC: 140 MMOL/L (ref 136–145)
T4 FREE SERPL-MCNC: 1.53 NG/DL (ref 0.93–1.7)
TRIGL SERPL-MCNC: 175 MG/DL (ref 0–150)
TSH SERPL DL<=0.05 MIU/L-ACNC: 2.82 UIU/ML (ref 0.27–4.2)
VLDLC SERPL-MCNC: 35 MG/DL (ref 5–40)
WBC NRBC COR # BLD: 8.91 10*3/MM3 (ref 3.4–10.8)

## 2020-06-15 PROCEDURE — 77063 BREAST TOMOSYNTHESIS BI: CPT | Performed by: RADIOLOGY

## 2020-06-15 PROCEDURE — 84439 ASSAY OF FREE THYROXINE: CPT

## 2020-06-15 PROCEDURE — 84443 ASSAY THYROID STIM HORMONE: CPT

## 2020-06-15 PROCEDURE — 80061 LIPID PANEL: CPT

## 2020-06-15 PROCEDURE — U0004 COV-19 TEST NON-CDC HGH THRU: HCPCS

## 2020-06-15 PROCEDURE — 36415 COLL VENOUS BLD VENIPUNCTURE: CPT

## 2020-06-15 PROCEDURE — 83036 HEMOGLOBIN GLYCOSYLATED A1C: CPT

## 2020-06-15 PROCEDURE — 77067 SCR MAMMO BI INCL CAD: CPT | Performed by: RADIOLOGY

## 2020-06-15 PROCEDURE — 77063 BREAST TOMOSYNTHESIS BI: CPT

## 2020-06-15 PROCEDURE — 82550 ASSAY OF CK (CPK): CPT

## 2020-06-15 PROCEDURE — C9803 HOPD COVID-19 SPEC COLLECT: HCPCS

## 2020-06-15 PROCEDURE — 85025 COMPLETE CBC W/AUTO DIFF WBC: CPT

## 2020-06-15 PROCEDURE — 77067 SCR MAMMO BI INCL CAD: CPT

## 2020-06-15 PROCEDURE — 80053 COMPREHEN METABOLIC PANEL: CPT

## 2020-06-15 PROCEDURE — U0002 COVID-19 LAB TEST NON-CDC: HCPCS

## 2020-06-26 ENCOUNTER — TRANSCRIBE ORDERS (OUTPATIENT)
Dept: ADMINISTRATIVE | Facility: HOSPITAL | Age: 65
End: 2020-06-26

## 2020-06-26 DIAGNOSIS — Z01.818 PREOP EXAMINATION: Primary | ICD-10-CM

## 2020-06-29 ENCOUNTER — LAB (OUTPATIENT)
Dept: LAB | Facility: HOSPITAL | Age: 65
End: 2020-06-29

## 2020-06-29 DIAGNOSIS — Z01.818 PREOP EXAMINATION: ICD-10-CM

## 2020-06-29 LAB
REF LAB TEST METHOD: NORMAL
SARS-COV-2 RNA RESP QL NAA+PROBE: NOT DETECTED

## 2020-06-29 PROCEDURE — C9803 HOPD COVID-19 SPEC COLLECT: HCPCS

## 2020-06-29 PROCEDURE — U0002 COVID-19 LAB TEST NON-CDC: HCPCS

## 2020-06-29 PROCEDURE — U0004 COV-19 TEST NON-CDC HGH THRU: HCPCS

## 2020-07-08 ENCOUNTER — OFFICE VISIT (OUTPATIENT)
Dept: CARDIOLOGY | Facility: CLINIC | Age: 65
End: 2020-07-08

## 2020-07-08 VITALS
SYSTOLIC BLOOD PRESSURE: 164 MMHG | HEIGHT: 63 IN | TEMPERATURE: 96.2 F | BODY MASS INDEX: 28.53 KG/M2 | WEIGHT: 161 LBS | HEART RATE: 63 BPM | OXYGEN SATURATION: 97 % | DIASTOLIC BLOOD PRESSURE: 88 MMHG

## 2020-07-08 DIAGNOSIS — J42 CHRONIC BRONCHITIS, UNSPECIFIED CHRONIC BRONCHITIS TYPE (HCC): ICD-10-CM

## 2020-07-08 DIAGNOSIS — I10 ESSENTIAL HYPERTENSION: ICD-10-CM

## 2020-07-08 DIAGNOSIS — E11.9 TYPE 2 DIABETES MELLITUS WITHOUT COMPLICATION, WITHOUT LONG-TERM CURRENT USE OF INSULIN (HCC): ICD-10-CM

## 2020-07-08 DIAGNOSIS — E78.2 MIXED HYPERLIPIDEMIA: Primary | ICD-10-CM

## 2020-07-08 DIAGNOSIS — E11.10 UNCONTROLLED TYPE 2 DIABETES MELLITUS WITH KETOACIDOSIS WITHOUT COMA (HCC): ICD-10-CM

## 2020-07-08 DIAGNOSIS — E03.8 OTHER SPECIFIED HYPOTHYROIDISM: ICD-10-CM

## 2020-07-08 DIAGNOSIS — J43.8 OTHER EMPHYSEMA (HCC): ICD-10-CM

## 2020-07-08 PROCEDURE — 99214 OFFICE O/P EST MOD 30 MIN: CPT | Performed by: INTERNAL MEDICINE

## 2020-07-08 RX ORDER — OFLOXACIN 3 MG/ML
SOLUTION/ DROPS OPHTHALMIC
COMMUNITY
Start: 2020-06-29 | End: 2020-10-05

## 2020-07-08 RX ORDER — ATORVASTATIN CALCIUM 10 MG/1
10 TABLET, FILM COATED ORAL DAILY
Qty: 90 TABLET | Refills: 3 | Status: SHIPPED | OUTPATIENT
Start: 2020-07-08 | End: 2023-04-04 | Stop reason: SDUPTHER

## 2020-07-08 RX ORDER — LEVOTHYROXINE SODIUM 88 UG/1
88 TABLET ORAL DAILY
Qty: 90 TABLET | Refills: 3 | Status: SHIPPED | OUTPATIENT
Start: 2020-07-08 | End: 2021-08-16

## 2020-07-08 RX ORDER — PREDNISOLONE ACETATE 10 MG/ML
SUSPENSION/ DROPS OPHTHALMIC
COMMUNITY
Start: 2020-06-29 | End: 2020-10-05

## 2020-07-08 RX ORDER — ALBUTEROL SULFATE 90 UG/1
2 AEROSOL, METERED RESPIRATORY (INHALATION) EVERY 4 HOURS PRN
Qty: 6.7 G | Refills: 3 | Status: SHIPPED | OUTPATIENT
Start: 2020-07-08 | End: 2021-06-30 | Stop reason: SDUPTHER

## 2020-07-08 RX ORDER — KETOROLAC TROMETHAMINE 5 MG/ML
SOLUTION OPHTHALMIC
COMMUNITY
Start: 2020-06-29 | End: 2020-10-05

## 2020-07-08 RX ORDER — LOSARTAN POTASSIUM 50 MG/1
50 TABLET ORAL DAILY
Qty: 90 TABLET | Refills: 2 | Status: SHIPPED | OUTPATIENT
Start: 2020-07-08 | End: 2021-01-05

## 2020-07-08 RX ORDER — FUROSEMIDE 20 MG/1
20 TABLET ORAL DAILY PRN
Qty: 30 TABLET | Refills: 3 | Status: SHIPPED | OUTPATIENT
Start: 2020-07-08 | End: 2021-06-30

## 2020-07-08 RX ORDER — MONTELUKAST SODIUM 10 MG/1
10 TABLET ORAL NIGHTLY
Qty: 90 TABLET | Refills: 3 | Status: SHIPPED | OUTPATIENT
Start: 2020-07-08 | End: 2021-04-01 | Stop reason: SDUPTHER

## 2020-07-08 NOTE — PROGRESS NOTES
subjective     Chief Complaint   Patient presents with   • Results     labs   • Hypertension   • Hyperlipidemia   • Med Refill     pending     History of Present Illness    Patient is 65 years old white female who is here for follow-up.  Blood pressure is running little bit high otherwise patient is doing good.  She had lab work done which will be reviewed.  She also had a mammogram done.  Recent cataract surgery.  Diabetic control is good and she is tolerating Tradjenta and Metformin very well.  Hyperlipidemia is controlled with Lipitor.  No drug side effects.  She is taking it regularly.  Breathing is better patient is taking albuterol inhaler Singulair and Breo Ellipta.  Hypothyroidism is controlled with Synthroid.  Multiple environmental allergies on Singulair and Claritin.    Patient has COPD but continues to smoke.  Past Surgical History:   Procedure Laterality Date   • CATARACT EXTRACTION, BILATERAL Bilateral     June and july 2020   • CHOLECYSTECTOMY     • COLONOSCOPY W/ BIOPSIES  2012   • HYSTERECTOMY     • VARICOSE VEIN SURGERY Right      Family History   Problem Relation Age of Onset   • Thyroid cancer Mother    • Aneurysm Mother    • Hypertension Mother    • Diabetes Mother    • Arthritis Mother    • Heart attack Father    • Heart disease Father    • Diabetes Brother    • Kidney failure Brother    • Hypertension Brother    • Diabetes Brother    • Hypertension Brother    • Breast cancer Maternal Aunt      Past Medical History:   Diagnosis Date   • B12 deficiency    • Bronchitis, acute    • Chronic pain syndrome    • COPD (chronic obstructive pulmonary disease) (CMS/HCC)    • Diabetes mellitus (CMS/HCC)    • Disease of thyroid gland    • Hyperlipidemia    • Hypertension      Patient Active Problem List   Diagnosis   • Acute bronchitis   • B12 deficiency*   • Chronic pain syndrome*   • Hypertension*   • Hyperlipidemia*   • Diabetes mellitus*   • Hypothyroidism*   • Vitamin D deficiency*   • Right ear pain    • Sebaceous cyst   • Chronic bronchitis (CMS/HCC)   • Age-related osteoporosis without current pathological fracture   • Tobacco use       Social History     Tobacco Use   • Smoking status: Current Every Day Smoker     Packs/day: 0.50     Years: 10.00     Pack years: 5.00     Types: Cigarettes   • Smokeless tobacco: Never Used   Substance Use Topics   • Alcohol use: No   • Drug use: No       Allergies   Allergen Reactions   • Avelox [Moxifloxacin] Hives     Burning itcing whelps allover       Current Outpatient Medications on File Prior to Visit   Medication Sig   • albuterol sulfate  (90 Base) MCG/ACT inhaler Inhale 2 puffs Every 4 (Four) Hours As Needed for Wheezing.   • atorvastatin (LIPITOR) 10 MG tablet Take 1 tablet by mouth Daily.   • Calcium Carb-Cholecalciferol (OS-JOY CALCIUM + D3) 500-200 MG-UNIT tablet Take one PO daily   • Fluticasone Furoate-Vilanterol (BREO ELLIPTA) 100-25 MCG/INH inhaler Inhale 1 puff Daily.   • furosemide (LASIX) 20 MG tablet Take 1 tablet by mouth Daily. (Patient taking differently: Take 20 mg by mouth Daily As Needed.)   • glucose blood test strip 1 each by Other route Daily. Use as instructed   • hydroxychloroquine (PLAQUENIL) 200 MG tablet Take 200 mg by mouth daily.   • ketorolac (ACULAR) 0.5 % ophthalmic solution    • levothyroxine (SYNTHROID) 88 MCG tablet Take 1 tablet by mouth Daily.   • loratadine (CLARITIN) 10 MG tablet Take 1 tablet by mouth Daily.   • losartan (COZAAR) 25 MG tablet TAKE ONE TABLET BY MOUTH EVERY DAY FOR BLOOD PRESSURE   • metFORMIN (GLUCOPHAGE) 1000 MG tablet TAKE ONE TABLET BY MOUTH TWICE A DAY BEFORE MEALS   • metoprolol tartrate (LOPRESSOR) 25 MG tablet TAKE ONE TABLET BY MOUTH EVERY 12 HOURS FOR BLOOD PRESSURE   • montelukast (SINGULAIR) 10 MG tablet Take 1 tablet by mouth Every Night.   • ofloxacin (OCUFLOX) 0.3 % ophthalmic solution    • ONE TOUCH LANCETS [facility] USES TWICE A DAY TO CHECK BLOOD SUGAR   • prednisoLONE acetate (PRED FORTE) 1 %  "ophthalmic suspension    • TRADJENTA 5 MG tablet tablet TAKE ONE TABLET BY MOUTH EVERY DAY FOR BLOOD SUGAR     No current facility-administered medications on file prior to visit.          The following portions of the patient's history were reviewed and updated as appropriate: allergies, current medications, past family history, past medical history, past social history, past surgical history and problem list.    Review of Systems   Constitution: Negative.   HENT: Negative.  Negative for congestion.    Eyes: Negative.    Cardiovascular: Negative.  Negative for chest pain, cyanosis, dyspnea on exertion, irregular heartbeat, leg swelling, near-syncope, orthopnea, palpitations, paroxysmal nocturnal dyspnea and syncope.   Respiratory: Negative.  Negative for shortness of breath.    Hematologic/Lymphatic: Negative.    Musculoskeletal: Negative.    Gastrointestinal: Negative.    Neurological: Negative.  Negative for headaches.          Objective:     /88 (BP Location: Left arm, Patient Position: Sitting, Cuff Size: Adult)   Pulse 63   Temp 96.2 °F (35.7 °C)   Ht 160 cm (63\")   Wt 73 kg (161 lb)   SpO2 97%   BMI 28.52 kg/m²   Physical Exam   Constitutional: She appears well-developed and well-nourished. No distress.   HENT:   Head: Normocephalic and atraumatic.   Mouth/Throat: Oropharynx is clear and moist. No oropharyngeal exudate.   Eyes: Pupils are equal, round, and reactive to light. Conjunctivae and EOM are normal. No scleral icterus.   Neck: Normal range of motion. Neck supple. No JVD present. No tracheal deviation present. No thyromegaly present.   Cardiovascular: Normal rate, regular rhythm, normal heart sounds and intact distal pulses. PMI is not displaced. Exam reveals no gallop, no friction rub and no decreased pulses.   No murmur heard.  Pulses:       Carotid pulses are 3+ on the right side, and 3+ on the left side.       Radial pulses are 3+ on the right side, and 3+ on the left side. "   Pulmonary/Chest: Effort normal and breath sounds normal. No respiratory distress. She has no wheezes. She has no rales. She exhibits no tenderness.   Abdominal: Soft. Bowel sounds are normal. She exhibits no distension, no abdominal bruit and no mass. There is no splenomegaly or hepatomegaly. There is no tenderness. There is no rebound and no guarding.   Musculoskeletal: Normal range of motion. She exhibits no edema, tenderness or deformity.   Lymphadenopathy:     She has no cervical adenopathy.   Neurological: She is alert. She has normal reflexes. No cranial nerve deficit. She exhibits normal muscle tone. Coordination normal.   Skin: Skin is warm and dry. No rash noted. She is not diaphoretic. No erythema.   Psychiatric: She has a normal mood and affect. Her behavior is normal. Judgment and thought content normal.         Lab Review  Lab Results   Component Value Date     06/15/2020    K 4.7 06/15/2020     06/15/2020    BUN 15 06/15/2020    CREATININE 0.68 06/15/2020    GLUCOSE 127 (H) 06/15/2020    CALCIUM 8.7 06/15/2020    ALT 8 06/15/2020    ALKPHOS 55 06/15/2020    LABIL2 1.5 04/28/2016     Lab Results   Component Value Date    CKTOTAL 32 06/15/2020     Lab Results   Component Value Date    WBC 8.91 06/15/2020    HGB 12.9 06/15/2020    HCT 37.9 06/15/2020     06/15/2020     No results found for: INR  No results found for: MG  Lab Results   Component Value Date    TSH 2.820 06/15/2020     No results found for: BNP  Lab Results   Component Value Date    CHLPL 184 04/28/2016    CHOL 176 06/15/2020    TRIG 175 (H) 06/15/2020    HDL 43 06/15/2020    VLDL 35 06/15/2020    LDLHDL 2.28 06/15/2020     Lab Results   Component Value Date    LDL 98 06/15/2020    LDL 94 11/21/2019       Procedures       I personally viewed and interpreted the patient's LAB data         Assessment:     1. Essential hypertension    2. Other emphysema (CMS/HCC)    3. Other specified hypothyroidism    4. Uncontrolled type  2 diabetes mellitus with ketoacidosis without coma (CMS/[facility])          Plan:     Labs reviewed, total cholesterol is 176, triglyceride 175 LDL 98.  Patient will continue current medications.  Healthy lifestyle again emphasized.  Blood pressure is elevated  Losartan dose was increased to 50 mg daily.  Patient has COPD but continues to smoke.  Cessation of tobacco use was stressed.  Lab work indicated blood sugar is 127 and hemoglobin A1c 5.9.  She will continue metformin and Tradjenta.  Healthy lifestyle again emphasized.  Refills were given.  Follow-up scheduled        No follow-ups on file.

## 2020-07-22 DIAGNOSIS — J43.8 OTHER EMPHYSEMA (HCC): ICD-10-CM

## 2020-07-22 DIAGNOSIS — E03.8 OTHER SPECIFIED HYPOTHYROIDISM: ICD-10-CM

## 2020-07-22 DIAGNOSIS — I10 ESSENTIAL HYPERTENSION: ICD-10-CM

## 2020-10-05 ENCOUNTER — OFFICE VISIT (OUTPATIENT)
Dept: CARDIOLOGY | Facility: CLINIC | Age: 65
End: 2020-10-05

## 2020-10-05 VITALS
DIASTOLIC BLOOD PRESSURE: 86 MMHG | OXYGEN SATURATION: 98 % | HEIGHT: 63 IN | TEMPERATURE: 97.3 F | HEART RATE: 65 BPM | SYSTOLIC BLOOD PRESSURE: 142 MMHG | WEIGHT: 160 LBS | BODY MASS INDEX: 28.35 KG/M2

## 2020-10-05 DIAGNOSIS — Z72.0 TOBACCO USE: ICD-10-CM

## 2020-10-05 DIAGNOSIS — Z23 NEED FOR PROPHYLACTIC VACCINATION AGAINST STREPTOCOCCUS PNEUMONIAE (PNEUMOCOCCUS) AND INFLUENZA: ICD-10-CM

## 2020-10-05 DIAGNOSIS — E03.9 HYPOTHYROIDISM, UNSPECIFIED TYPE: ICD-10-CM

## 2020-10-05 DIAGNOSIS — E11.9 TYPE 2 DIABETES MELLITUS WITHOUT COMPLICATION, WITHOUT LONG-TERM CURRENT USE OF INSULIN (HCC): Primary | ICD-10-CM

## 2020-10-05 DIAGNOSIS — E78.2 MIXED HYPERLIPIDEMIA: ICD-10-CM

## 2020-10-05 DIAGNOSIS — M81.0 AGE-RELATED OSTEOPOROSIS WITHOUT CURRENT PATHOLOGICAL FRACTURE: ICD-10-CM

## 2020-10-05 DIAGNOSIS — I10 ESSENTIAL HYPERTENSION: ICD-10-CM

## 2020-10-05 PROCEDURE — 90670 PCV13 VACCINE IM: CPT | Performed by: INTERNAL MEDICINE

## 2020-10-05 PROCEDURE — G0008 ADMIN INFLUENZA VIRUS VAC: HCPCS | Performed by: INTERNAL MEDICINE

## 2020-10-05 PROCEDURE — 99214 OFFICE O/P EST MOD 30 MIN: CPT | Performed by: INTERNAL MEDICINE

## 2020-10-05 PROCEDURE — G0009 ADMIN PNEUMOCOCCAL VACCINE: HCPCS | Performed by: INTERNAL MEDICINE

## 2020-10-05 PROCEDURE — 90653 IIV ADJUVANT VACCINE IM: CPT | Performed by: INTERNAL MEDICINE

## 2020-10-05 NOTE — PROGRESS NOTES
subjective     Chief Complaint   Patient presents with   • Hypertension   • Hyperlipidemia     pt stating lipitor is cramping her leg bad   • Med Refill     pending     History of Present Illness  Patient is 65 years old white female who is here for follow-up.  She states that she is doing very well blood pressure is very well controlled with current medications.  She is taking losartan 50 mg daily.  She also has hyperlipidemia and has been taking Synthroid 88 mcg daily.  Clinically she is euthyroid with no specific complaints.  Multiple environmental allergies are better with Singulair and Claritin which she is taking regularly.  She also has hyperlipidemia and has been taking Lipitor 10 mg daily  COPD is controlled with albuterol and Breo Ellipta    Patient is also diabetic, she is trying to follow diet and is taking Tradjenta and metformin.  No side effects lab work will be checked next visit.  She continues to use tobacco cessation of tobacco use was again stressed.    Patient has vitamin D deficiency and is taking calcium with D  Rheumatoid arthritis on Plaquenil through rheumatologist.  Past Surgical History:   Procedure Laterality Date   • CATARACT EXTRACTION, BILATERAL Bilateral     June and july 2020   • CHOLECYSTECTOMY     • COLONOSCOPY W/ BIOPSIES  2012   • HYSTERECTOMY     • VARICOSE VEIN SURGERY Right      Family History   Problem Relation Age of Onset   • Thyroid cancer Mother    • Aneurysm Mother    • Hypertension Mother    • Diabetes Mother    • Arthritis Mother    • Heart attack Father    • Heart disease Father    • Diabetes Brother    • Kidney failure Brother    • Hypertension Brother    • Diabetes Brother    • Hypertension Brother    • Breast cancer Maternal Aunt      Past Medical History:   Diagnosis Date   • B12 deficiency    • Bronchitis, acute    • Chronic pain syndrome    • COPD (chronic obstructive pulmonary disease) (CMS/HCC)    • Diabetes mellitus (CMS/HCC)    • Disease of thyroid gland     • Hyperlipidemia    • Hypertension      Patient Active Problem List   Diagnosis   • Acute bronchitis   • B12 deficiency*   • Chronic pain syndrome*   • Hypertension*   • Hyperlipidemia*   • Diabetes mellitus*   • Hypothyroidism*   • Vitamin D deficiency*   • Right ear pain   • Sebaceous cyst   • Chronic bronchitis (CMS/HCC)   • Age-related osteoporosis without current pathological fracture   • Tobacco use       Social History     Tobacco Use   • Smoking status: Current Every Day Smoker     Packs/day: 0.50     Years: 10.00     Pack years: 5.00     Types: Cigarettes   • Smokeless tobacco: Never Used   Substance Use Topics   • Alcohol use: No   • Drug use: No       Allergies   Allergen Reactions   • Avelox [Moxifloxacin] Hives     Burning itcing whelps allover       Current Outpatient Medications on File Prior to Visit   Medication Sig   • albuterol sulfate  (90 Base) MCG/ACT inhaler Inhale 2 puffs Every 4 (Four) Hours As Needed for Wheezing.   • Fluticasone Furoate-Vilanterol (Breo Ellipta) 100-25 MCG/INH inhaler Inhale 1 puff Daily.   • furosemide (LASIX) 20 MG tablet Take 1 tablet by mouth Daily As Needed (edema).   • glucose blood test strip 1 each by Other route Daily. Use as instructed   • hydroxychloroquine (PLAQUENIL) 200 MG tablet Take 200 mg by mouth daily.   • levothyroxine (Synthroid) 88 MCG tablet Take 1 tablet by mouth Daily.   • linagliptin (Tradjenta) 5 MG tablet tablet Take 1 tablet by mouth Daily. for blood sugar   • loratadine (CLARITIN) 10 MG tablet Take 1 tablet by mouth Daily.   • losartan (COZAAR) 50 MG tablet Take 1 tablet by mouth Daily. for blood pressure   • metFORMIN (GLUCOPHAGE) 1000 MG tablet TAKE ONE TABLET BY MOUTH TWICE A DAY BEFORE MEALS   • metoprolol tartrate (LOPRESSOR) 25 MG tablet TAKE ONE TABLET BY MOUTH EVERY 12 HOURS FOR BLOOD PRESSURE   • montelukast (SINGULAIR) 10 MG tablet Take 1 tablet by mouth Every Night.   • ONE TOUCH LANCETS [facility] [place] USES TWICE A DAY TO CHECK BLOOD  "SUGAR   • [DISCONTINUED] Calcium Carb-Cholecalciferol (OS-JOY CALCIUM + D3) 500-200 MG-UNIT tablet Take one PO daily   • atorvastatin (LIPITOR) 10 MG tablet Take 1 tablet by mouth Daily.   • [DISCONTINUED] ketorolac (ACULAR) 0.5 % ophthalmic solution    • [DISCONTINUED] ofloxacin (OCUFLOX) 0.3 % ophthalmic solution    • [DISCONTINUED] prednisoLONE acetate (PRED FORTE) 1 % ophthalmic suspension      No current facility-administered medications on file prior to visit.          The following portions of the patient's history were reviewed and updated as appropriate: allergies, current medications, past family history, past medical history, past social history, past surgical history and problem list.    Review of Systems   Constitution: Negative.   HENT: Negative.  Negative for congestion.    Eyes: Negative.    Cardiovascular: Negative.  Negative for chest pain, cyanosis, dyspnea on exertion, irregular heartbeat, leg swelling, near-syncope, orthopnea, palpitations, paroxysmal nocturnal dyspnea and syncope.   Respiratory: Negative.  Negative for shortness of breath.    Hematologic/Lymphatic: Negative.    Musculoskeletal: Positive for arthritis.   Gastrointestinal: Negative.    Neurological: Negative.  Negative for headaches.          Objective:     /86 (BP Location: Left arm, Patient Position: Sitting, Cuff Size: Adult)   Pulse 65   Temp 97.3 °F (36.3 °C)   Ht 160 cm (63\")   Wt 72.6 kg (160 lb)   SpO2 98%   BMI 28.34 kg/m²   Vitals signs and nursing note reviewed.   Constitutional:       General: Not in acute distress.     Appearance: Healthy appearance. Well-developed and not in distress. Not diaphoretic.   Eyes:      General: No scleral icterus.     Conjunctiva/sclera: Conjunctivae normal.      Pupils: Pupils are equal, round, and reactive to light.   HENT:      Head: Normocephalic and atraumatic.   Neck:      Musculoskeletal: Normal range of motion and neck supple.      Thyroid: Thyroid normal. No " thyromegaly.      Vascular: No JVD. JVD normal.      Trachea: No tracheal deviation.      Lymphadenopathy: No cervical adenopathy.   Pulmonary:      Effort: Pulmonary effort is normal. No respiratory distress.      Breath sounds: Normal breath sounds and air entry.   Chest:      Chest wall: Not tender to palpatation.   Cardiovascular:      PMI at left midclavicular line. Normal rate. Regular rhythm.      No gallop.   Pulses:     Intact distal pulses. No decreased pulses.   Abdominal:      General: Bowel sounds are normal. There is no distension or abdominal bruit.      Palpations: Abdomen is soft. There is no hepatomegaly, splenomegaly or abdominal mass.      Tenderness: There is no abdominal tenderness. There is no guarding or rebound.   Skin:     General: Skin is warm and dry. There is no cyanosis.      Coloration: Skin is not jaundiced or pale.      Findings: No erythema or rash.   Neurological:      Mental Status: Alert, oriented to person, place, and time and oriented to person, place and time.   Psychiatric:         Attention and Perception: Attention normal.         Mood and Affect: Mood and affect normal.         Speech: Speech normal.         Behavior: Behavior is cooperative.           Lab Review  Lab work scheduled for next visit.  Procedures       I personally viewed and interpreted the patient's LAB data         Assessment:     1. Type 2 diabetes mellitus without complication, without long-term current use of insulin (CMS/[facility])    2. Need for prophylactic vaccination against Streptococcus pneumoniae (pneumococcus) and influenza    3. Mixed hyperlipidemia    4. Essential hypertension    5. Age-related osteoporosis without current pathological fracture    6. Hypothyroidism, unspecified type    7. Tobacco use          Plan:     Patient is 65 years old white female who is doing very well with current medication.  She is diabetic and is taking Tradjenta and metformin.  She is also trying to follow diet lab  work scheduled for next visit.  Flu shot and pneumonia shot was given.  Patient has hyperlipidemia which has been very well controlled she was advised to continue statin therapy with Lipitor.  Synthroid 88 mcg was continued.  Cessation of tobacco use stressed.  Healthy lifestyle emphasized.  Follow-up with lab work scheduled        No follow-ups on file.

## 2020-12-04 ENCOUNTER — LAB (OUTPATIENT)
Dept: LAB | Facility: HOSPITAL | Age: 65
End: 2020-12-04

## 2020-12-04 DIAGNOSIS — E11.9 TYPE 2 DIABETES MELLITUS WITHOUT COMPLICATION, WITHOUT LONG-TERM CURRENT USE OF INSULIN (HCC): ICD-10-CM

## 2020-12-04 DIAGNOSIS — E03.9 HYPOTHYROIDISM, UNSPECIFIED TYPE: ICD-10-CM

## 2020-12-04 DIAGNOSIS — I10 ESSENTIAL HYPERTENSION: ICD-10-CM

## 2020-12-04 DIAGNOSIS — E78.2 MIXED HYPERLIPIDEMIA: ICD-10-CM

## 2020-12-04 LAB
ALBUMIN SERPL-MCNC: 4.23 G/DL (ref 3.5–5.2)
ALBUMIN/GLOB SERPL: 1.5 G/DL
ALP SERPL-CCNC: 66 U/L (ref 39–117)
ALT SERPL W P-5'-P-CCNC: 7 U/L (ref 1–33)
ANION GAP SERPL CALCULATED.3IONS-SCNC: 7.6 MMOL/L (ref 5–15)
AST SERPL-CCNC: 9 U/L (ref 1–32)
BASOPHILS # BLD AUTO: 0.07 10*3/MM3 (ref 0–0.2)
BASOPHILS NFR BLD AUTO: 0.7 % (ref 0–1.5)
BILIRUB SERPL-MCNC: 0.4 MG/DL (ref 0–1.2)
BUN SERPL-MCNC: 14 MG/DL (ref 8–23)
BUN/CREAT SERPL: 18.9 (ref 7–25)
CALCIUM SPEC-SCNC: 9.4 MG/DL (ref 8.6–10.5)
CHLORIDE SERPL-SCNC: 100 MMOL/L (ref 98–107)
CHOLEST SERPL-MCNC: 173 MG/DL (ref 0–200)
CK SERPL-CCNC: 36 U/L (ref 20–180)
CO2 SERPL-SCNC: 29.4 MMOL/L (ref 22–29)
CREAT SERPL-MCNC: 0.74 MG/DL (ref 0.57–1)
DEPRECATED RDW RBC AUTO: 50.7 FL (ref 37–54)
EOSINOPHIL # BLD AUTO: 0.19 10*3/MM3 (ref 0–0.4)
EOSINOPHIL NFR BLD AUTO: 1.9 % (ref 0.3–6.2)
ERYTHROCYTE [DISTWIDTH] IN BLOOD BY AUTOMATED COUNT: 13.7 % (ref 12.3–15.4)
GFR SERPL CREATININE-BSD FRML MDRD: 79 ML/MIN/1.73
GLOBULIN UR ELPH-MCNC: 2.8 GM/DL
GLUCOSE SERPL-MCNC: 128 MG/DL (ref 65–99)
HBA1C MFR BLD: 5.9 % (ref 4.8–5.6)
HCT VFR BLD AUTO: 41.8 % (ref 34–46.6)
HDLC SERPL-MCNC: 49 MG/DL (ref 40–60)
HGB BLD-MCNC: 13.4 G/DL (ref 12–15.9)
IMM GRANULOCYTES # BLD AUTO: 0.03 10*3/MM3 (ref 0–0.05)
IMM GRANULOCYTES NFR BLD AUTO: 0.3 % (ref 0–0.5)
LDLC SERPL CALC-MCNC: 94 MG/DL (ref 0–100)
LDLC/HDLC SERPL: 1.81 {RATIO}
LYMPHOCYTES # BLD AUTO: 1.88 10*3/MM3 (ref 0.7–3.1)
LYMPHOCYTES NFR BLD AUTO: 19.2 % (ref 19.6–45.3)
MCH RBC QN AUTO: 32.2 PG (ref 26.6–33)
MCHC RBC AUTO-ENTMCNC: 32.1 G/DL (ref 31.5–35.7)
MCV RBC AUTO: 100.5 FL (ref 79–97)
MONOCYTES # BLD AUTO: 0.62 10*3/MM3 (ref 0.1–0.9)
MONOCYTES NFR BLD AUTO: 6.3 % (ref 5–12)
NEUTROPHILS NFR BLD AUTO: 7 10*3/MM3 (ref 1.7–7)
NEUTROPHILS NFR BLD AUTO: 71.6 % (ref 42.7–76)
NRBC BLD AUTO-RTO: 0 /100 WBC (ref 0–0.2)
PLATELET # BLD AUTO: 346 10*3/MM3 (ref 140–450)
PMV BLD AUTO: 10.3 FL (ref 6–12)
POTASSIUM SERPL-SCNC: 4.8 MMOL/L (ref 3.5–5.2)
PROT SERPL-MCNC: 7 G/DL (ref 6–8.5)
RBC # BLD AUTO: 4.16 10*6/MM3 (ref 3.77–5.28)
SODIUM SERPL-SCNC: 137 MMOL/L (ref 136–145)
T4 FREE SERPL-MCNC: 1.56 NG/DL (ref 0.93–1.7)
TRIGL SERPL-MCNC: 176 MG/DL (ref 0–150)
TSH SERPL DL<=0.05 MIU/L-ACNC: 2.67 UIU/ML (ref 0.27–4.2)
VLDLC SERPL-MCNC: 30 MG/DL (ref 5–40)
WBC # BLD AUTO: 9.79 10*3/MM3 (ref 3.4–10.8)

## 2020-12-04 PROCEDURE — 36415 COLL VENOUS BLD VENIPUNCTURE: CPT

## 2020-12-04 PROCEDURE — 80053 COMPREHEN METABOLIC PANEL: CPT

## 2020-12-04 PROCEDURE — 84443 ASSAY THYROID STIM HORMONE: CPT

## 2020-12-04 PROCEDURE — 82550 ASSAY OF CK (CPK): CPT

## 2020-12-04 PROCEDURE — 83036 HEMOGLOBIN GLYCOSYLATED A1C: CPT

## 2020-12-04 PROCEDURE — 85025 COMPLETE CBC W/AUTO DIFF WBC: CPT

## 2020-12-04 PROCEDURE — 84439 ASSAY OF FREE THYROXINE: CPT

## 2020-12-04 PROCEDURE — 80061 LIPID PANEL: CPT

## 2021-01-05 ENCOUNTER — OFFICE VISIT (OUTPATIENT)
Dept: CARDIOLOGY | Facility: CLINIC | Age: 66
End: 2021-01-05

## 2021-01-05 VITALS
HEIGHT: 63 IN | HEART RATE: 64 BPM | TEMPERATURE: 97.1 F | DIASTOLIC BLOOD PRESSURE: 92 MMHG | WEIGHT: 160 LBS | BODY MASS INDEX: 28.35 KG/M2 | OXYGEN SATURATION: 96 % | SYSTOLIC BLOOD PRESSURE: 154 MMHG

## 2021-01-05 DIAGNOSIS — E03.8 OTHER SPECIFIED HYPOTHYROIDISM: ICD-10-CM

## 2021-01-05 DIAGNOSIS — E11.9 TYPE 2 DIABETES MELLITUS WITHOUT COMPLICATION, WITHOUT LONG-TERM CURRENT USE OF INSULIN (HCC): ICD-10-CM

## 2021-01-05 DIAGNOSIS — Z72.0 TOBACCO USE: ICD-10-CM

## 2021-01-05 DIAGNOSIS — E78.2 MIXED HYPERLIPIDEMIA: Primary | ICD-10-CM

## 2021-01-05 DIAGNOSIS — I10 ESSENTIAL HYPERTENSION: ICD-10-CM

## 2021-01-05 DIAGNOSIS — J43.8 OTHER EMPHYSEMA (HCC): ICD-10-CM

## 2021-01-05 DIAGNOSIS — Z91.09 ENVIRONMENTAL ALLERGIES: ICD-10-CM

## 2021-01-05 PROCEDURE — 99214 OFFICE O/P EST MOD 30 MIN: CPT | Performed by: INTERNAL MEDICINE

## 2021-01-05 RX ORDER — LEVOCETIRIZINE DIHYDROCHLORIDE 5 MG/1
5 TABLET, FILM COATED ORAL EVERY EVENING
Qty: 90 TABLET | Refills: 1 | Status: SHIPPED | OUTPATIENT
Start: 2021-01-05 | End: 2021-06-30

## 2021-01-05 RX ORDER — LOSARTAN POTASSIUM 50 MG/1
100 TABLET ORAL DAILY
Qty: 180 TABLET | Refills: 2 | Status: SHIPPED | OUTPATIENT
Start: 2021-01-05 | End: 2021-02-22

## 2021-01-05 NOTE — PROGRESS NOTES
subjective     Chief Complaint   Patient presents with   • sinus pressure     associated with congestion and runny nose, denies fever   • Results     labs   • Hyperlipidemia     Follow up   • Hypertension     today   • Med Refill     pending     History of Present Illness  Patient is 65 years old white female who is here for follow-up  She complains of sinus congestion runny nose but no fever.  No cough or expectoration.  Discharge is clear.  She is taking Zyrtec and Singulair but is not helping.    Blood pressure is running slightly higher.  She is taking medications regularly.    Patient also has hyperlipidemia and lab work will be reviewed and discussed with the patient.  Diabetes mellitus patient is taking Metformin and Tradjenta.      Past Surgical History:   Procedure Laterality Date   • CATARACT EXTRACTION, BILATERAL Bilateral     June and july 2020   • CHOLECYSTECTOMY     • COLONOSCOPY W/ BIOPSIES  2012   • HYSTERECTOMY     • VARICOSE VEIN SURGERY Right      Family History   Problem Relation Age of Onset   • Thyroid cancer Mother    • Aneurysm Mother    • Hypertension Mother    • Diabetes Mother    • Arthritis Mother    • Heart attack Father    • Heart disease Father    • Diabetes Brother    • Kidney failure Brother    • Hypertension Brother    • Diabetes Brother    • Hypertension Brother    • Breast cancer Maternal Aunt      Past Medical History:   Diagnosis Date   • B12 deficiency    • Bronchitis, acute    • Chronic pain syndrome    • COPD (chronic obstructive pulmonary disease) (CMS/HCC)    • Diabetes mellitus (CMS/HCC)    • Disease of thyroid gland    • Hyperlipidemia    • Hypertension      Patient Active Problem List   Diagnosis   • Acute bronchitis   • B12 deficiency*   • Chronic pain syndrome*   • Hypertension*   • Hyperlipidemia*   • Diabetes mellitus*   • Hypothyroidism*   • Vitamin D deficiency*   • Right ear pain   • Sebaceous cyst   • Chronic bronchitis (CMS/HCC)   • Age-related osteoporosis  without current pathological fracture   • Tobacco use   • Environmental allergies       Social History     Tobacco Use   • Smoking status: Current Every Day Smoker     Packs/day: 0.50     Years: 10.00     Pack years: 5.00     Types: Cigarettes   • Smokeless tobacco: Never Used   Substance Use Topics   • Alcohol use: No   • Drug use: No       Allergies   Allergen Reactions   • Avelox [Moxifloxacin] Hives     Burning itcing whelps allover       Current Outpatient Medications on File Prior to Visit   Medication Sig   • albuterol sulfate  (90 Base) MCG/ACT inhaler Inhale 2 puffs Every 4 (Four) Hours As Needed for Wheezing.   • atorvastatin (LIPITOR) 10 MG tablet Take 1 tablet by mouth Daily.   • Calcium Carb-Cholecalciferol (Os-Gian Calcium + D3) 500-200 MG-UNIT tablet Take one PO daily   • Fluticasone Furoate-Vilanterol (Breo Ellipta) 100-25 MCG/INH inhaler Inhale 1 puff Daily.   • furosemide (LASIX) 20 MG tablet Take 1 tablet by mouth Daily As Needed (edema).   • glucose blood test strip 1 each by Other route Daily. Use as instructed   • hydroxychloroquine (PLAQUENIL) 200 MG tablet Take 200 mg by mouth daily.   • levothyroxine (Synthroid) 88 MCG tablet Take 1 tablet by mouth Daily.   • linagliptin (Tradjenta) 5 MG tablet tablet Take 1 tablet by mouth Daily. for blood sugar   • metoprolol tartrate (LOPRESSOR) 25 MG tablet TAKE ONE TABLET BY MOUTH EVERY 12 HOURS FOR BLOOD PRESSURE   • montelukast (SINGULAIR) 10 MG tablet Take 1 tablet by mouth Every Night.   • ONE TOUCH LANCETS [facility] [place] USES TWICE A DAY TO CHECK BLOOD SUGAR   • [DISCONTINUED] loratadine (CLARITIN) 10 MG tablet Take 1 tablet by mouth Daily.   • [DISCONTINUED] losartan (COZAAR) 50 MG tablet Take 1 tablet by mouth Daily. for blood pressure   • [DISCONTINUED] metFORMIN (GLUCOPHAGE) 1000 MG tablet TAKE ONE TABLET BY MOUTH TWICE A DAY BEFORE MEALS     No current facility-administered medications on file prior to visit.          The following portions of the  "patient's history were reviewed and updated as appropriate: allergies, current medications, past family history, past medical history, past social history, past surgical history and problem list.    Review of Systems   Constitution: Negative.   HENT: Positive for congestion.    Eyes: Negative.    Cardiovascular: Negative.  Negative for chest pain, cyanosis, dyspnea on exertion, irregular heartbeat, leg swelling, near-syncope, orthopnea, palpitations, paroxysmal nocturnal dyspnea and syncope.        Elevated blood pressure   Respiratory: Negative.  Negative for shortness of breath.    Hematologic/Lymphatic: Negative.    Musculoskeletal: Negative.    Gastrointestinal: Negative.    Neurological: Negative.  Negative for headaches.          Objective:     /92 (BP Location: Left arm, Patient Position: Sitting, Cuff Size: Adult)   Pulse 64   Temp 97.1 °F (36.2 °C)   Ht 160 cm (63\")   Wt 72.6 kg (160 lb)   SpO2 96%   BMI 28.34 kg/m²   Vitals signs and nursing note reviewed.   Constitutional:       General: Not in acute distress.     Appearance: Healthy appearance. Well-developed and not in distress. Not diaphoretic.   Eyes:      General: No scleral icterus.     Conjunctiva/sclera: Conjunctivae normal.      Pupils: Pupils are equal, round, and reactive to light.   HENT:      Head: Normocephalic and atraumatic.   Neck:      Musculoskeletal: Normal range of motion and neck supple.      Thyroid: Thyroid normal. No thyromegaly.      Vascular: No JVD. JVD normal.      Trachea: No tracheal deviation.      Lymphadenopathy: No cervical adenopathy.   Pulmonary:      Effort: Pulmonary effort is normal. No respiratory distress.      Breath sounds: Normal breath sounds and air entry.   Chest:      Chest wall: Not tender to palpatation.   Cardiovascular:      PMI at left midclavicular line. Normal rate. Regular rhythm.      No gallop.   Pulses:     Intact distal pulses. No decreased pulses.   Abdominal:      General: Bowel " sounds are normal. There is no distension or abdominal bruit.      Palpations: Abdomen is soft. There is no hepatomegaly, splenomegaly or abdominal mass.      Tenderness: There is no abdominal tenderness. There is no guarding or rebound.   Skin:     General: Skin is warm and dry. There is no cyanosis.      Coloration: Skin is not jaundiced or pale.      Findings: No erythema or rash.   Neurological:      Mental Status: Alert, oriented to person, place, and time and oriented to person, place and time.   Psychiatric:         Attention and Perception: Attention normal.         Mood and Affect: Mood and affect normal.         Speech: Speech normal.         Behavior: Behavior is cooperative.           Lab Review  Lab Results   Component Value Date     12/04/2020    K 4.8 12/04/2020     12/04/2020    BUN 14 12/04/2020    CREATININE 0.74 12/04/2020    GLUCOSE 128 (H) 12/04/2020    CALCIUM 9.4 12/04/2020    ALT 7 12/04/2020    ALKPHOS 66 12/04/2020    LABIL2 1.5 04/28/2016     Lab Results   Component Value Date    CKTOTAL 36 12/04/2020     Lab Results   Component Value Date    WBC 9.79 12/04/2020    HGB 13.4 12/04/2020    HCT 41.8 12/04/2020     12/04/2020     No results found for: INR  No results found for: MG  Lab Results   Component Value Date    TSH 2.670 12/04/2020     No results found for: BNP  Lab Results   Component Value Date    CHLPL 184 04/28/2016    CHOL 173 12/04/2020    TRIG 176 (H) 12/04/2020    HDL 49 12/04/2020    VLDL 30 12/04/2020    LDLHDL 1.81 12/04/2020     Lab Results   Component Value Date    LDL 94 12/04/2020    LDL 98 06/15/2020       Procedures       I personally viewed and interpreted the patient's LAB data         Assessment:     1. Mixed hyperlipidemia    2. Essential hypertension    3. Other emphysema (CMS/HCC)    4. Other specified hypothyroidism    5. Type 2 diabetes mellitus without complication, without long-term current use of insulin (CMS/HCC)    6. Tobacco use    7.  Environmental allergies          Plan:     Blood pressure is elevated patient was advised to continue Lopressor twice daily, losartan was increased to 100 mg daily.    Patient has environmental allergies a lot of sinus congestion and drainage she will take Singulair 10 mg daily Claritin was discontinued and she was started on Xyzal 5 mg daily    Lipid panel is normal she will continue Lipitor 10 mg daily.    Blood sugar is mildly elevated she will continue Metformin and Tradjenta diet restrictions were again discussed.    Hypothyroidism is also well controlled, Synthroid was continued    CBC, CMP, lipid panel and thyroid functions were reviewed and discussed with the patient  Follow-up in 3 months          No follow-ups on file.

## 2021-01-29 ENCOUNTER — TRANSCRIBE ORDERS (OUTPATIENT)
Dept: ADMINISTRATIVE | Facility: HOSPITAL | Age: 66
End: 2021-01-29

## 2021-01-29 ENCOUNTER — LAB (OUTPATIENT)
Dept: LAB | Facility: HOSPITAL | Age: 66
End: 2021-01-29

## 2021-01-29 DIAGNOSIS — M06.4 INFLAMMATORY POLYARTHROPATHY (HCC): ICD-10-CM

## 2021-01-29 DIAGNOSIS — M32.9 SYSTEMIC LUPUS ERYTHEMATOSUS, UNSPECIFIED SLE TYPE, UNSPECIFIED ORGAN INVOLVEMENT STATUS (HCC): Primary | ICD-10-CM

## 2021-01-29 DIAGNOSIS — E11.9 DIABETES MELLITUS WITHOUT COMPLICATION (HCC): ICD-10-CM

## 2021-01-29 DIAGNOSIS — M15.0 PRIMARY GENERALIZED (OSTEO)ARTHRITIS: ICD-10-CM

## 2021-01-29 DIAGNOSIS — R53.82 CHRONIC FATIGUE: ICD-10-CM

## 2021-01-29 DIAGNOSIS — Z72.0 TOBACCO ABUSE: ICD-10-CM

## 2021-01-29 DIAGNOSIS — M32.9 SYSTEMIC LUPUS ERYTHEMATOSUS, UNSPECIFIED SLE TYPE, UNSPECIFIED ORGAN INVOLVEMENT STATUS (HCC): ICD-10-CM

## 2021-01-29 LAB
ALBUMIN SERPL-MCNC: 4 G/DL (ref 3.5–5.2)
ALBUMIN/GLOB SERPL: 1.3 G/DL
ALP SERPL-CCNC: 63 U/L (ref 39–117)
ALT SERPL W P-5'-P-CCNC: 10 U/L (ref 1–33)
ANION GAP SERPL CALCULATED.3IONS-SCNC: 12.2 MMOL/L (ref 5–15)
AST SERPL-CCNC: 10 U/L (ref 1–32)
BASOPHILS # BLD AUTO: 0.07 10*3/MM3 (ref 0–0.2)
BASOPHILS NFR BLD AUTO: 0.7 % (ref 0–1.5)
BILIRUB SERPL-MCNC: 0.2 MG/DL (ref 0–1.2)
BUN SERPL-MCNC: 15 MG/DL (ref 8–23)
BUN/CREAT SERPL: 18.3 (ref 7–25)
CALCIUM SPEC-SCNC: 9.8 MG/DL (ref 8.6–10.5)
CHLORIDE SERPL-SCNC: 99 MMOL/L (ref 98–107)
CO2 SERPL-SCNC: 27.8 MMOL/L (ref 22–29)
CREAT SERPL-MCNC: 0.82 MG/DL (ref 0.57–1)
CRP SERPL-MCNC: 0.94 MG/DL (ref 0–0.5)
DEPRECATED RDW RBC AUTO: 46.6 FL (ref 37–54)
EOSINOPHIL # BLD AUTO: 0.24 10*3/MM3 (ref 0–0.4)
EOSINOPHIL NFR BLD AUTO: 2.2 % (ref 0.3–6.2)
ERYTHROCYTE [DISTWIDTH] IN BLOOD BY AUTOMATED COUNT: 13 % (ref 12.3–15.4)
ERYTHROCYTE [SEDIMENTATION RATE] IN BLOOD: 19 MM/HR (ref 0–30)
GFR SERPL CREATININE-BSD FRML MDRD: 70 ML/MIN/1.73
GLOBULIN UR ELPH-MCNC: 3.2 GM/DL
GLUCOSE SERPL-MCNC: 135 MG/DL (ref 65–99)
HCT VFR BLD AUTO: 41.8 % (ref 34–46.6)
HGB BLD-MCNC: 14 G/DL (ref 12–15.9)
IMM GRANULOCYTES # BLD AUTO: 0.04 10*3/MM3 (ref 0–0.05)
IMM GRANULOCYTES NFR BLD AUTO: 0.4 % (ref 0–0.5)
LYMPHOCYTES # BLD AUTO: 1.98 10*3/MM3 (ref 0.7–3.1)
LYMPHOCYTES NFR BLD AUTO: 18.4 % (ref 19.6–45.3)
MCH RBC QN AUTO: 32.9 PG (ref 26.6–33)
MCHC RBC AUTO-ENTMCNC: 33.5 G/DL (ref 31.5–35.7)
MCV RBC AUTO: 98.4 FL (ref 79–97)
MONOCYTES # BLD AUTO: 0.69 10*3/MM3 (ref 0.1–0.9)
MONOCYTES NFR BLD AUTO: 6.4 % (ref 5–12)
NEUTROPHILS NFR BLD AUTO: 7.72 10*3/MM3 (ref 1.7–7)
NEUTROPHILS NFR BLD AUTO: 71.9 % (ref 42.7–76)
NRBC BLD AUTO-RTO: 0 /100 WBC (ref 0–0.2)
PLATELET # BLD AUTO: 371 10*3/MM3 (ref 140–450)
PMV BLD AUTO: 10.8 FL (ref 6–12)
POTASSIUM SERPL-SCNC: 5 MMOL/L (ref 3.5–5.2)
PROT SERPL-MCNC: 7.2 G/DL (ref 6–8.5)
RBC # BLD AUTO: 4.25 10*6/MM3 (ref 3.77–5.28)
SODIUM SERPL-SCNC: 139 MMOL/L (ref 136–145)
WBC # BLD AUTO: 10.74 10*3/MM3 (ref 3.4–10.8)

## 2021-01-29 PROCEDURE — 80053 COMPREHEN METABOLIC PANEL: CPT

## 2021-01-29 PROCEDURE — 85025 COMPLETE CBC W/AUTO DIFF WBC: CPT

## 2021-01-29 PROCEDURE — 85652 RBC SED RATE AUTOMATED: CPT

## 2021-01-29 PROCEDURE — 36415 COLL VENOUS BLD VENIPUNCTURE: CPT

## 2021-01-29 PROCEDURE — 86140 C-REACTIVE PROTEIN: CPT

## 2021-02-22 DIAGNOSIS — Z23 IMMUNIZATION DUE: ICD-10-CM

## 2021-02-22 RX ORDER — LOSARTAN POTASSIUM 50 MG/1
100 TABLET ORAL DAILY
Qty: 180 TABLET | Refills: 2 | Status: SHIPPED | OUTPATIENT
Start: 2021-02-22 | End: 2021-09-29 | Stop reason: SDUPTHER

## 2021-02-22 RX ORDER — LOSARTAN POTASSIUM 50 MG/1
TABLET ORAL
Qty: 90 TABLET | Refills: 2 | Status: SHIPPED | OUTPATIENT
Start: 2021-02-22 | End: 2021-04-01 | Stop reason: DRUGHIGH

## 2021-04-01 ENCOUNTER — OFFICE VISIT (OUTPATIENT)
Dept: CARDIOLOGY | Facility: CLINIC | Age: 66
End: 2021-04-01

## 2021-04-01 VITALS
HEART RATE: 61 BPM | OXYGEN SATURATION: 98 % | TEMPERATURE: 98 F | WEIGHT: 162 LBS | HEIGHT: 63 IN | DIASTOLIC BLOOD PRESSURE: 86 MMHG | SYSTOLIC BLOOD PRESSURE: 134 MMHG | BODY MASS INDEX: 28.7 KG/M2

## 2021-04-01 DIAGNOSIS — Z91.09 ENVIRONMENTAL ALLERGIES: ICD-10-CM

## 2021-04-01 DIAGNOSIS — E78.2 MIXED HYPERLIPIDEMIA: ICD-10-CM

## 2021-04-01 DIAGNOSIS — I10 ESSENTIAL HYPERTENSION: ICD-10-CM

## 2021-04-01 DIAGNOSIS — E03.9 HYPOTHYROIDISM, UNSPECIFIED TYPE: ICD-10-CM

## 2021-04-01 DIAGNOSIS — Z72.0 TOBACCO USE: ICD-10-CM

## 2021-04-01 DIAGNOSIS — E11.9 TYPE 2 DIABETES MELLITUS WITHOUT COMPLICATION, WITHOUT LONG-TERM CURRENT USE OF INSULIN (HCC): Primary | ICD-10-CM

## 2021-04-01 PROCEDURE — 99214 OFFICE O/P EST MOD 30 MIN: CPT | Performed by: INTERNAL MEDICINE

## 2021-04-01 RX ORDER — TRIAMCINOLONE ACETONIDE 0.25 MG/G
CREAM TOPICAL 2 TIMES DAILY
Qty: 15 G | Refills: 1 | Status: SHIPPED | OUTPATIENT
Start: 2021-04-01 | End: 2021-06-30

## 2021-04-01 RX ORDER — MONTELUKAST SODIUM 10 MG/1
10 TABLET ORAL NIGHTLY
Qty: 90 TABLET | Refills: 3 | Status: SHIPPED | OUTPATIENT
Start: 2021-04-01 | End: 2021-06-30 | Stop reason: SDUPTHER

## 2021-04-01 NOTE — PROGRESS NOTES
subjective     Chief Complaint   Patient presents with   • Insect Bite     left forearm redness itching and sore happened last Tues   • Diabetes     Follow up gluc 113 this am   • Hypertension     Follow up increased Losartan last office visit   • Med Refill     pending     History of Present Illness  Patient is 65 years old white female who is here for follow-up.  She states that she had an insect bite /something.  Patient does not know what actually happened but she has a spot on the forearm which is appears to be slightly red and itching.  She was working out helping her  she never saw any insect bite per se but thinks it might be.  There is no bite fazal.  Other problems consist of diabetes mellitus.  Blood sugar is about 113 this morning overall she is doing fairly well.  Blood pressure is very well controlled with current medication.  She also has hyperlipidemia and hypothyroidism and is taking medications regularly.    Past Surgical History:   Procedure Laterality Date   • CATARACT EXTRACTION, BILATERAL Bilateral     June and july 2020   • CHOLECYSTECTOMY     • COLONOSCOPY W/ BIOPSIES  2012   • HYSTERECTOMY     • VARICOSE VEIN SURGERY Right      Family History   Problem Relation Age of Onset   • Thyroid cancer Mother    • Aneurysm Mother    • Hypertension Mother    • Diabetes Mother    • Arthritis Mother    • Heart attack Father    • Heart disease Father    • Diabetes Brother    • Kidney failure Brother    • Hypertension Brother    • Diabetes Brother    • Hypertension Brother    • Breast cancer Maternal Aunt      Past Medical History:   Diagnosis Date   • B12 deficiency    • Bronchitis, acute    • Chronic pain syndrome    • COPD (chronic obstructive pulmonary disease) (CMS/HCC)    • Diabetes mellitus (CMS/HCC)    • Disease of thyroid gland    • Hyperlipidemia    • Hypertension      Patient Active Problem List   Diagnosis   • Acute bronchitis   • B12 deficiency*   • Chronic pain syndrome*   •  Hypertension*   • Hyperlipidemia*   • Diabetes mellitus*   • Hypothyroidism*   • Vitamin D deficiency*   • Right ear pain   • Sebaceous cyst   • Chronic bronchitis (CMS/HCC)   • Age-related osteoporosis without current pathological fracture   • Tobacco use   • Environmental allergies       Social History     Tobacco Use   • Smoking status: Current Every Day Smoker     Packs/day: 0.50     Years: 10.00     Pack years: 5.00     Types: Cigarettes   • Smokeless tobacco: Never Used   Substance Use Topics   • Alcohol use: No   • Drug use: No       Allergies   Allergen Reactions   • Avelox [Moxifloxacin] Hives     Burning itcing whelps allover       Current Outpatient Medications on File Prior to Visit   Medication Sig   • albuterol sulfate  (90 Base) MCG/ACT inhaler Inhale 2 puffs Every 4 (Four) Hours As Needed for Wheezing.   • atorvastatin (LIPITOR) 10 MG tablet Take 1 tablet by mouth Daily.   • Calcium Carb-Cholecalciferol (Os-Gian Calcium + D3) 500-200 MG-UNIT tablet Take one PO daily   • Fluticasone Furoate-Vilanterol (Breo Ellipta) 100-25 MCG/INH inhaler Inhale 1 puff Daily.   • furosemide (LASIX) 20 MG tablet Take 1 tablet by mouth Daily As Needed (edema).   • hydroxychloroquine (PLAQUENIL) 200 MG tablet Take 200 mg by mouth daily.   • levocetirizine (Xyzal) 5 MG tablet Take 1 tablet by mouth Every Evening.   • levothyroxine (Synthroid) 88 MCG tablet Take 1 tablet by mouth Daily.   • linagliptin (Tradjenta) 5 MG tablet tablet Take 1 tablet by mouth Daily. for blood sugar   • losartan (COZAAR) 50 MG tablet Take 2 tablets by mouth Daily. for blood pressure   • metFORMIN (GLUCOPHAGE) 1000 MG tablet Take 1 tablet by mouth 2 (Two) Times a Day Before Meals.   • metoprolol tartrate (LOPRESSOR) 25 MG tablet TAKE ONE TABLET BY MOUTH EVERY 12 HOURS FOR BLOOD PRESSURE   • ONE TOUCH LANCETS [facility] [place] USES TWICE A DAY TO CHECK BLOOD SUGAR   • [DISCONTINUED] glucose blood test strip 1 each by Other route Daily. Use as instructed  "  • [DISCONTINUED] montelukast (SINGULAIR) 10 MG tablet Take 1 tablet by mouth Every Night.   • [DISCONTINUED] losartan (COZAAR) 50 MG tablet TAKE ONE TABLET BY MOUTH EVERY DAY FOR BLOOD PRESSURE     No current facility-administered medications on file prior to visit.         The following portions of the patient's history were reviewed and updated as appropriate: allergies, current medications, past family history, past medical history, past social history, past surgical history and problem list.    Review of Systems   Constitutional: Negative.   HENT: Negative.  Negative for congestion.    Eyes: Negative.    Cardiovascular: Negative.  Negative for chest pain, cyanosis, dyspnea on exertion, irregular heartbeat, leg swelling, near-syncope, orthopnea, palpitations, paroxysmal nocturnal dyspnea and syncope.   Respiratory: Negative.  Negative for shortness of breath.    Hematologic/Lymphatic: Negative.    Skin: Positive for itching and rash.   Musculoskeletal: Negative.    Gastrointestinal: Negative.    Neurological: Negative.  Negative for headaches.          Objective:     /86 (BP Location: Left arm, Patient Position: Sitting, Cuff Size: Adult)   Pulse 61   Temp 98 °F (36.7 °C)   Ht 160 cm (63\")   Wt 73.5 kg (162 lb)   SpO2 98%   BMI 28.70 kg/m²   Cardiovascular:      PMI at left midclavicular line. Normal rate. Regular rhythm. Normal S1. Normal S2.      Murmurs: There is no murmur.      No gallop. No click. No rub.   Pulses:     Intact distal pulses.   Edema:     Peripheral edema absent.   Skin:     Findings: Rash present.           Lab Review  Lab work 2 months ago reviewed and discussed with the patient      Procedures       I personally viewed and interpreted the patient's LAB data         Assessment:     1. Type 2 diabetes mellitus without complication, without long-term current use of insulin (CMS/[facility])    2. Essential hypertension    3. Mixed hyperlipidemia    4. Tobacco use    5. Hypothyroidism, " unspecified type    6. Environmental allergies          Plan:     Blood sugar is very well controlled is 113 this morning.  She is trying to follow diet and she was advised to continue current medications which consist of Tradjenta and Glucophage.  She checks her sugar daily.  Blood pressure is also very well controlled she will continue Lopressor Lasix and losartan.  Lipitor was continued for hyperlipidemia.  She will also continue Synthroid.  She was given Kenalog cream.  Follow-up scheduled        No follow-ups on file.

## 2021-06-30 ENCOUNTER — OFFICE VISIT (OUTPATIENT)
Dept: CARDIOLOGY | Facility: CLINIC | Age: 66
End: 2021-06-30

## 2021-06-30 VITALS
HEART RATE: 64 BPM | OXYGEN SATURATION: 98 % | BODY MASS INDEX: 28.35 KG/M2 | HEIGHT: 63 IN | DIASTOLIC BLOOD PRESSURE: 98 MMHG | SYSTOLIC BLOOD PRESSURE: 138 MMHG | WEIGHT: 160 LBS | TEMPERATURE: 97.3 F

## 2021-06-30 DIAGNOSIS — E55.9 VITAMIN D DEFICIENCY: ICD-10-CM

## 2021-06-30 DIAGNOSIS — E03.9 HYPOTHYROIDISM, UNSPECIFIED TYPE: ICD-10-CM

## 2021-06-30 DIAGNOSIS — I10 ESSENTIAL HYPERTENSION: Primary | ICD-10-CM

## 2021-06-30 DIAGNOSIS — Z91.09 ENVIRONMENTAL ALLERGIES: ICD-10-CM

## 2021-06-30 DIAGNOSIS — E78.2 MIXED HYPERLIPIDEMIA: ICD-10-CM

## 2021-06-30 DIAGNOSIS — E11.9 TYPE 2 DIABETES MELLITUS WITHOUT COMPLICATION, WITHOUT LONG-TERM CURRENT USE OF INSULIN (HCC): ICD-10-CM

## 2021-06-30 DIAGNOSIS — E53.8 B12 DEFICIENCY: ICD-10-CM

## 2021-06-30 DIAGNOSIS — Z72.0 TOBACCO USE: ICD-10-CM

## 2021-06-30 PROCEDURE — 99214 OFFICE O/P EST MOD 30 MIN: CPT | Performed by: INTERNAL MEDICINE

## 2021-06-30 RX ORDER — ALBUTEROL SULFATE 90 UG/1
2 AEROSOL, METERED RESPIRATORY (INHALATION) EVERY 4 HOURS PRN
Qty: 6.7 G | Refills: 3 | Status: SHIPPED | OUTPATIENT
Start: 2021-06-30 | End: 2022-07-13

## 2021-06-30 RX ORDER — MONTELUKAST SODIUM 10 MG/1
10 TABLET ORAL NIGHTLY
Qty: 90 TABLET | Refills: 3 | Status: SHIPPED | OUTPATIENT
Start: 2021-06-30 | End: 2022-07-13

## 2021-06-30 NOTE — PROGRESS NOTES
subjective     Chief Complaint   Patient presents with   • Hypertension     Follow up   • Diabetes     Follow up   • Med Refill     pending     History of Present Illness  Patient is 66 years old white female who is here for follow-up.  She states that she is doing fairly well .    Blood pressure is very well controlled  She is taking Lopressor 25 twice daily and losartan 100 mg daily.    She is also diabetic and has been taking Metformin and Tradjenta.    Hypothyroidism on Synthroid 88 mcg daily.  Environmental allergies and COPD on Breo Ellipta albuterol HFA and Singulair.  Patient also is trying to lose weight.  Ongoing tobacco use patient is trying to cut down smoking    Past Surgical History:   Procedure Laterality Date   • CATARACT EXTRACTION, BILATERAL Bilateral     June and july 2020   • CHOLECYSTECTOMY     • COLONOSCOPY W/ BIOPSIES  2012   • HYSTERECTOMY     • VARICOSE VEIN SURGERY Right      Family History   Problem Relation Age of Onset   • Thyroid cancer Mother    • Aneurysm Mother    • Hypertension Mother    • Diabetes Mother    • Arthritis Mother    • Heart attack Father    • Heart disease Father    • Diabetes Brother    • Kidney failure Brother    • Hypertension Brother    • Diabetes Brother    • Hypertension Brother    • Breast cancer Maternal Aunt      Past Medical History:   Diagnosis Date   • B12 deficiency    • Bronchitis, acute    • Chronic pain syndrome    • COPD (chronic obstructive pulmonary disease) (CMS/HCC)    • Diabetes mellitus (CMS/HCC)    • Disease of thyroid gland    • Hyperlipidemia    • Hypertension      Patient Active Problem List   Diagnosis   • Acute bronchitis   • B12 deficiency*   • Chronic pain syndrome*   • Hypertension*   • Hyperlipidemia*   • Diabetes mellitus*   • Hypothyroidism*   • Vitamin D deficiency*   • Right ear pain   • Sebaceous cyst   • Chronic bronchitis (CMS/HCC)   • Age-related osteoporosis without current pathological fracture   • Tobacco use   •  Environmental allergies       Social History     Tobacco Use   • Smoking status: Current Every Day Smoker     Packs/day: 0.50     Years: 10.00     Pack years: 5.00     Types: Cigarettes   • Smokeless tobacco: Never Used   Substance Use Topics   • Alcohol use: No   • Drug use: No       Allergies   Allergen Reactions   • Avelox [Moxifloxacin] Hives     Burning itcing whelps allover       Current Outpatient Medications on File Prior to Visit   Medication Sig   • atorvastatin (LIPITOR) 10 MG tablet Take 1 tablet by mouth Daily.   • Calcium Carb-Cholecalciferol (Os-Gian Calcium + D3) 500-200 MG-UNIT tablet Take one PO daily   • Fluticasone Furoate-Vilanterol (Breo Ellipta) 100-25 MCG/INH inhaler Inhale 1 puff Daily.   • glucose blood test strip 1 each by Other route Daily.   • hydroxychloroquine (PLAQUENIL) 200 MG tablet Take 200 mg by mouth daily.   • levothyroxine (Synthroid) 88 MCG tablet Take 1 tablet by mouth Daily.   • linagliptin (Tradjenta) 5 MG tablet tablet Take 1 tablet by mouth Daily. for blood sugar   • losartan (COZAAR) 50 MG tablet Take 2 tablets by mouth Daily. for blood pressure   • metFORMIN (GLUCOPHAGE) 1000 MG tablet Take 1 tablet by mouth 2 (Two) Times a Day Before Meals.   • metoprolol tartrate (LOPRESSOR) 25 MG tablet TAKE ONE TABLET BY MOUTH EVERY 12 HOURS FOR BLOOD PRESSURE   • ONE TOUCH LANCETS [facility] [place] USES TWICE A DAY TO CHECK BLOOD SUGAR   • [DISCONTINUED] albuterol sulfate  (90 Base) MCG/ACT inhaler Inhale 2 puffs Every 4 (Four) Hours As Needed for Wheezing.   • [DISCONTINUED] montelukast (SINGULAIR) 10 MG tablet Take 1 tablet by mouth Every Night.   • [DISCONTINUED] furosemide (LASIX) 20 MG tablet Take 1 tablet by mouth Daily As Needed (edema).   • [DISCONTINUED] levocetirizine (Xyzal) 5 MG tablet Take 1 tablet by mouth Every Evening.   • [DISCONTINUED] triamcinolone (KENALOG) 0.025 % cream Apply  topically to the appropriate area as directed 2 (Two) Times a Day.     No current  "facility-administered medications on file prior to visit.         The following portions of the patient's history were reviewed and updated as appropriate: allergies, current medications, past family history, past medical history, past social history, past surgical history and problem list.    Review of Systems   Constitutional: Negative.   HENT: Negative.  Negative for congestion.    Eyes: Negative.    Cardiovascular: Negative.  Negative for chest pain, cyanosis, dyspnea on exertion, irregular heartbeat, leg swelling, near-syncope, orthopnea, palpitations, paroxysmal nocturnal dyspnea and syncope.   Respiratory: Negative.  Negative for shortness of breath.    Hematologic/Lymphatic: Negative.    Musculoskeletal: Negative.    Gastrointestinal: Negative.    Neurological: Negative.  Negative for headaches.          Objective:     /98 (BP Location: Left arm, Patient Position: Sitting, Cuff Size: Adult)   Pulse 64   Temp 97.3 °F (36.3 °C)   Ht 160 cm (63\")   Wt 72.6 kg (160 lb)   SpO2 98%   BMI 28.34 kg/m²   Vitals and nursing note reviewed.   Constitutional:       General: Not in acute distress.     Appearance: Healthy appearance. Well-developed and not in distress. Not diaphoretic.   Eyes:      General: No scleral icterus.     Conjunctiva/sclera: Conjunctivae normal.      Pupils: Pupils are equal, round, and reactive to light.   HENT:      Head: Normocephalic and atraumatic.   Neck:      Thyroid: Thyroid normal. No thyromegaly.      Vascular: No JVD. JVD normal.      Trachea: No tracheal deviation.      Lymphadenopathy: No cervical adenopathy.   Pulmonary:      Effort: Pulmonary effort is normal. No respiratory distress.      Breath sounds: Normal air entry. Wheezing present. Rhonchi present.   Chest:      Chest wall: Not tender to palpatation.   Cardiovascular:      PMI at left midclavicular line. Normal rate. Regular rhythm.      No gallop.   Pulses:     Intact distal pulses. No decreased pulses. "   Abdominal:      General: Bowel sounds are normal. There is no distension or abdominal bruit.      Palpations: Abdomen is soft. There is no hepatomegaly, splenomegaly or abdominal mass.      Tenderness: There is no abdominal tenderness. There is no guarding or rebound.   Musculoskeletal:      Cervical back: Normal range of motion and neck supple. Skin:     General: Skin is warm and dry. There is no cyanosis.      Coloration: Skin is not jaundiced or pale.      Findings: No erythema or rash.   Neurological:      Mental Status: Alert, oriented to person, place, and time and oriented to person, place and time.   Psychiatric:         Attention and Perception: Attention normal.         Mood and Affect: Mood and affect normal.         Speech: Speech normal.         Behavior: Behavior is cooperative.           Lab Review  Lab Results   Component Value Date     01/29/2021    K 5.0 01/29/2021    CL 99 01/29/2021    BUN 15 01/29/2021    CREATININE 0.82 01/29/2021    GLUCOSE 135 (H) 01/29/2021    CALCIUM 9.8 01/29/2021    ALT 10 01/29/2021    ALKPHOS 63 01/29/2021    LABIL2 1.5 04/28/2016     Lab Results   Component Value Date    CKTOTAL 36 12/04/2020     Lab Results   Component Value Date    WBC 10.74 01/29/2021    HGB 14.0 01/29/2021    HCT 41.8 01/29/2021     01/29/2021     No results found for: INR  No results found for: MG  Lab Results   Component Value Date    TSH 2.670 12/04/2020     No results found for: BNP  Lab Results   Component Value Date    CHLPL 184 04/28/2016    CHOL 173 12/04/2020    TRIG 176 (H) 12/04/2020    HDL 49 12/04/2020    VLDL 30 12/04/2020    LDLHDL 1.81 12/04/2020     Lab Results   Component Value Date    LDL 94 12/04/2020    LDL 98 06/15/2020       Procedures       I personally viewed and interpreted the patient's LAB data         Assessment:     1. Essential hypertension    2. Mixed hyperlipidemia    3. Type 2 diabetes mellitus without complication, without long-term current use of  insulin (CMS/[facility])    4. Hypothyroidism, unspecified type    5. Tobacco use    6. Environmental allergies    7. B12 deficiency*    8. Vitamin D deficiency*          Plan:     Blood pressure is very well controlled she will continue losartan and Lopressor.  Hyperlipidemia with LDL 94 6 months ago  Lab work scheduled aggressive risk factor modification weight loss dietary restrictions and continuation of statin therapy was discussed.  She will continue Lipitor 10 mg daily.  Lab work for diabetes also scheduled including A1c and urine for microalbumin.  She will continue current medications  Synthroid dose was continued.  Cessation of tobacco use stressed.  Follow-up scheduled  Lab work including CBC, CMP, lipid panel, A1c, urine for microalbumin, T4 TSH and vitamin D level was arranged        No follow-ups on file.

## 2021-07-15 ENCOUNTER — HOSPITAL ENCOUNTER (OUTPATIENT)
Dept: MAMMOGRAPHY | Facility: HOSPITAL | Age: 66
Discharge: HOME | End: 2021-07-15
Admitting: INTERNAL MEDICINE

## 2021-07-15 DIAGNOSIS — Z12.31 VISIT FOR SCREENING MAMMOGRAM: ICD-10-CM

## 2021-07-15 PROCEDURE — 77063 BREAST TOMOSYNTHESIS BI: CPT

## 2021-07-15 PROCEDURE — 77067 SCR MAMMO BI INCL CAD: CPT | Performed by: RADIOLOGY

## 2021-07-15 PROCEDURE — 77063 BREAST TOMOSYNTHESIS BI: CPT | Performed by: RADIOLOGY

## 2021-07-15 PROCEDURE — 77067 SCR MAMMO BI INCL CAD: CPT

## 2021-07-27 ENCOUNTER — TELEPHONE (OUTPATIENT)
Dept: PHARMACY | Facility: HOSPITAL | Age: 66
End: 2021-07-27

## 2021-07-27 NOTE — TELEPHONE ENCOUNTER
This patient has been identified by her insurance company as not filling a statin (with diabetes) this calendar year. Her ASCVD risk score is 31%. Provider notes state to continue Lipitor, although she mentioned cramping with this medication.    I attempted to call her and discuss her current statin therapy. She did not answer. I will try again at a later date.    Corrine Muro, PharmD  Population Health Pharmacist  07/27/21

## 2021-08-09 ENCOUNTER — LAB (OUTPATIENT)
Dept: LAB | Facility: HOSPITAL | Age: 66
End: 2021-08-09

## 2021-08-09 DIAGNOSIS — E03.9 HYPOTHYROIDISM, UNSPECIFIED TYPE: ICD-10-CM

## 2021-08-09 DIAGNOSIS — E11.9 TYPE 2 DIABETES MELLITUS WITHOUT COMPLICATION, WITHOUT LONG-TERM CURRENT USE OF INSULIN (HCC): ICD-10-CM

## 2021-08-09 DIAGNOSIS — E53.8 B12 DEFICIENCY: ICD-10-CM

## 2021-08-09 DIAGNOSIS — E55.9 VITAMIN D DEFICIENCY: ICD-10-CM

## 2021-08-09 DIAGNOSIS — E78.2 MIXED HYPERLIPIDEMIA: ICD-10-CM

## 2021-08-09 LAB
25(OH)D3 SERPL-MCNC: 24.8 NG/ML
ALBUMIN SERPL-MCNC: 4 G/DL (ref 3.5–5.2)
ALBUMIN UR-MCNC: 4.4 MG/DL
ALBUMIN/GLOB SERPL: 1.4 G/DL
ALP SERPL-CCNC: 60 U/L (ref 39–117)
ALT SERPL W P-5'-P-CCNC: 12 U/L (ref 1–33)
ANION GAP SERPL CALCULATED.3IONS-SCNC: 12.1 MMOL/L (ref 5–15)
AST SERPL-CCNC: 12 U/L (ref 1–32)
BASOPHILS # BLD AUTO: 0.09 10*3/MM3 (ref 0–0.2)
BASOPHILS NFR BLD AUTO: 1 % (ref 0–1.5)
BILIRUB SERPL-MCNC: 0.2 MG/DL (ref 0–1.2)
BUN SERPL-MCNC: 9 MG/DL (ref 8–23)
BUN/CREAT SERPL: 10.8 (ref 7–25)
CALCIUM SPEC-SCNC: 9.1 MG/DL (ref 8.6–10.5)
CHLORIDE SERPL-SCNC: 102 MMOL/L (ref 98–107)
CHOLEST SERPL-MCNC: 180 MG/DL (ref 0–200)
CK SERPL-CCNC: 31 U/L (ref 20–180)
CO2 SERPL-SCNC: 24.9 MMOL/L (ref 22–29)
CREAT SERPL-MCNC: 0.83 MG/DL (ref 0.57–1)
DEPRECATED RDW RBC AUTO: 42.6 FL (ref 37–54)
EOSINOPHIL # BLD AUTO: 0.23 10*3/MM3 (ref 0–0.4)
EOSINOPHIL NFR BLD AUTO: 2.5 % (ref 0.3–6.2)
ERYTHROCYTE [DISTWIDTH] IN BLOOD BY AUTOMATED COUNT: 12.8 % (ref 12.3–15.4)
GFR SERPL CREATININE-BSD FRML MDRD: 69 ML/MIN/1.73
GLOBULIN UR ELPH-MCNC: 2.9 GM/DL
GLUCOSE SERPL-MCNC: 113 MG/DL (ref 65–99)
HBA1C MFR BLD: 5.82 % (ref 4.8–5.6)
HCT VFR BLD AUTO: 37.4 % (ref 34–46.6)
HDLC SERPL-MCNC: 52 MG/DL (ref 40–60)
HGB BLD-MCNC: 12.7 G/DL (ref 12–15.9)
IMM GRANULOCYTES # BLD AUTO: 0.03 10*3/MM3 (ref 0–0.05)
IMM GRANULOCYTES NFR BLD AUTO: 0.3 % (ref 0–0.5)
LDLC SERPL CALC-MCNC: 97 MG/DL (ref 0–100)
LDLC/HDLC SERPL: 1.76 {RATIO}
LYMPHOCYTES # BLD AUTO: 2.03 10*3/MM3 (ref 0.7–3.1)
LYMPHOCYTES NFR BLD AUTO: 22 % (ref 19.6–45.3)
MCH RBC QN AUTO: 31.4 PG (ref 26.6–33)
MCHC RBC AUTO-ENTMCNC: 34 G/DL (ref 31.5–35.7)
MCV RBC AUTO: 92.3 FL (ref 79–97)
MONOCYTES # BLD AUTO: 0.62 10*3/MM3 (ref 0.1–0.9)
MONOCYTES NFR BLD AUTO: 6.7 % (ref 5–12)
NEUTROPHILS NFR BLD AUTO: 6.23 10*3/MM3 (ref 1.7–7)
NEUTROPHILS NFR BLD AUTO: 67.5 % (ref 42.7–76)
NRBC BLD AUTO-RTO: 0 /100 WBC (ref 0–0.2)
PLATELET # BLD AUTO: 369 10*3/MM3 (ref 140–450)
PMV BLD AUTO: 10.4 FL (ref 6–12)
POTASSIUM SERPL-SCNC: 4.8 MMOL/L (ref 3.5–5.2)
PROT SERPL-MCNC: 6.9 G/DL (ref 6–8.5)
RBC # BLD AUTO: 4.05 10*6/MM3 (ref 3.77–5.28)
SODIUM SERPL-SCNC: 139 MMOL/L (ref 136–145)
TRIGL SERPL-MCNC: 183 MG/DL (ref 0–150)
TSH SERPL DL<=0.05 MIU/L-ACNC: 3.83 UIU/ML (ref 0.27–4.2)
VIT B12 BLD-MCNC: <150 PG/ML (ref 211–946)
VLDLC SERPL-MCNC: 31 MG/DL (ref 5–40)
WBC # BLD AUTO: 9.23 10*3/MM3 (ref 3.4–10.8)

## 2021-08-09 PROCEDURE — 83036 HEMOGLOBIN GLYCOSYLATED A1C: CPT

## 2021-08-09 PROCEDURE — 80053 COMPREHEN METABOLIC PANEL: CPT

## 2021-08-09 PROCEDURE — 80061 LIPID PANEL: CPT

## 2021-08-09 PROCEDURE — 84443 ASSAY THYROID STIM HORMONE: CPT

## 2021-08-09 PROCEDURE — 82043 UR ALBUMIN QUANTITATIVE: CPT

## 2021-08-09 PROCEDURE — 82607 VITAMIN B-12: CPT

## 2021-08-09 PROCEDURE — 82550 ASSAY OF CK (CPK): CPT

## 2021-08-09 PROCEDURE — 82306 VITAMIN D 25 HYDROXY: CPT

## 2021-08-09 PROCEDURE — 36415 COLL VENOUS BLD VENIPUNCTURE: CPT

## 2021-08-09 PROCEDURE — 85025 COMPLETE CBC W/AUTO DIFF WBC: CPT

## 2021-08-16 RX ORDER — LEVOTHYROXINE SODIUM 88 UG/1
TABLET ORAL
Qty: 90 TABLET | Refills: 1 | Status: SHIPPED | OUTPATIENT
Start: 2021-08-16 | End: 2022-01-05 | Stop reason: SDUPTHER

## 2021-09-29 ENCOUNTER — OFFICE VISIT (OUTPATIENT)
Dept: CARDIOLOGY | Facility: CLINIC | Age: 66
End: 2021-09-29

## 2021-09-29 VITALS
TEMPERATURE: 98.5 F | WEIGHT: 161.8 LBS | SYSTOLIC BLOOD PRESSURE: 120 MMHG | BODY MASS INDEX: 28.67 KG/M2 | HEART RATE: 56 BPM | RESPIRATION RATE: 18 BRPM | HEIGHT: 63 IN | OXYGEN SATURATION: 95 % | DIASTOLIC BLOOD PRESSURE: 80 MMHG

## 2021-09-29 DIAGNOSIS — E03.8 OTHER SPECIFIED HYPOTHYROIDISM: ICD-10-CM

## 2021-09-29 DIAGNOSIS — J43.8 OTHER EMPHYSEMA (HCC): ICD-10-CM

## 2021-09-29 DIAGNOSIS — E78.2 MIXED HYPERLIPIDEMIA: Primary | ICD-10-CM

## 2021-09-29 DIAGNOSIS — E11.9 TYPE 2 DIABETES MELLITUS WITHOUT COMPLICATION, WITHOUT LONG-TERM CURRENT USE OF INSULIN (HCC): ICD-10-CM

## 2021-09-29 DIAGNOSIS — Z72.0 TOBACCO USE: ICD-10-CM

## 2021-09-29 DIAGNOSIS — I10 ESSENTIAL HYPERTENSION: ICD-10-CM

## 2021-09-29 PROCEDURE — 99214 OFFICE O/P EST MOD 30 MIN: CPT | Performed by: INTERNAL MEDICINE

## 2021-09-29 RX ORDER — LOSARTAN POTASSIUM 50 MG/1
100 TABLET ORAL DAILY
Qty: 180 TABLET | Refills: 2 | Status: SHIPPED | OUTPATIENT
Start: 2021-09-29 | End: 2022-07-05 | Stop reason: SDUPTHER

## 2021-10-11 ENCOUNTER — TELEPHONE (OUTPATIENT)
Dept: CARDIOLOGY | Facility: CLINIC | Age: 66
End: 2021-10-11

## 2021-10-11 DIAGNOSIS — E11.9 TYPE 2 DIABETES MELLITUS WITHOUT COMPLICATION, WITHOUT LONG-TERM CURRENT USE OF INSULIN (HCC): Primary | ICD-10-CM

## 2021-10-11 NOTE — TELEPHONE ENCOUNTER
Pt  Called stating her sugar is running high again wanting to start back on her tradjenta 5mg daily.  Per verbal order Dr Rosario.  Is ok with starting back on the medication.  Called rx to [facility].  Also needs labs prior to next office visit.  She agreed and v/u.

## 2021-10-15 DIAGNOSIS — I10 ESSENTIAL HYPERTENSION: ICD-10-CM

## 2021-10-15 DIAGNOSIS — E03.8 OTHER SPECIFIED HYPOTHYROIDISM: ICD-10-CM

## 2021-10-15 DIAGNOSIS — J43.8 OTHER EMPHYSEMA (HCC): ICD-10-CM

## 2021-10-25 ENCOUNTER — CLINICAL SUPPORT (OUTPATIENT)
Dept: CARDIOLOGY | Facility: CLINIC | Age: 66
End: 2021-10-25

## 2021-10-25 DIAGNOSIS — G89.4 CHRONIC PAIN SYNDROME: ICD-10-CM

## 2021-10-25 DIAGNOSIS — Z23 NEED FOR IMMUNIZATION AGAINST INFLUENZA: ICD-10-CM

## 2021-10-25 PROCEDURE — G0008 ADMIN INFLUENZA VIRUS VAC: HCPCS | Performed by: INTERNAL MEDICINE

## 2021-10-25 PROCEDURE — 90662 IIV NO PRSV INCREASED AG IM: CPT | Performed by: INTERNAL MEDICINE

## 2021-12-06 ENCOUNTER — TELEPHONE (OUTPATIENT)
Dept: CARDIOLOGY | Facility: CLINIC | Age: 66
End: 2021-12-06

## 2021-12-06 DIAGNOSIS — I10 ESSENTIAL HYPERTENSION: Primary | ICD-10-CM

## 2021-12-06 DIAGNOSIS — E78.2 MIXED HYPERLIPIDEMIA: ICD-10-CM

## 2021-12-06 DIAGNOSIS — E11.9 TYPE 2 DIABETES MELLITUS WITHOUT COMPLICATION, WITHOUT LONG-TERM CURRENT USE OF INSULIN (HCC): ICD-10-CM

## 2021-12-06 DIAGNOSIS — E03.8 OTHER SPECIFIED HYPOTHYROIDISM: ICD-10-CM

## 2021-12-27 ENCOUNTER — LAB (OUTPATIENT)
Dept: LAB | Facility: HOSPITAL | Age: 66
End: 2021-12-27

## 2021-12-27 DIAGNOSIS — I10 ESSENTIAL HYPERTENSION: ICD-10-CM

## 2021-12-27 DIAGNOSIS — E11.9 TYPE 2 DIABETES MELLITUS WITHOUT COMPLICATION, WITHOUT LONG-TERM CURRENT USE OF INSULIN (HCC): ICD-10-CM

## 2021-12-27 DIAGNOSIS — E78.2 MIXED HYPERLIPIDEMIA: ICD-10-CM

## 2021-12-27 DIAGNOSIS — E03.8 OTHER SPECIFIED HYPOTHYROIDISM: ICD-10-CM

## 2021-12-27 LAB
ALBUMIN SERPL-MCNC: 4 G/DL (ref 3.5–5.2)
ALBUMIN/GLOB SERPL: 1.5 G/DL
ALP SERPL-CCNC: 58 U/L (ref 39–117)
ALT SERPL W P-5'-P-CCNC: 7 U/L (ref 1–33)
ANION GAP SERPL CALCULATED.3IONS-SCNC: 8.6 MMOL/L (ref 5–15)
AST SERPL-CCNC: 9 U/L (ref 1–32)
BASOPHILS # BLD AUTO: 0.07 10*3/MM3 (ref 0–0.2)
BASOPHILS NFR BLD AUTO: 0.7 % (ref 0–1.5)
BILIRUB SERPL-MCNC: 0.3 MG/DL (ref 0–1.2)
BUN SERPL-MCNC: 10 MG/DL (ref 8–23)
BUN/CREAT SERPL: 12 (ref 7–25)
CALCIUM SPEC-SCNC: 9 MG/DL (ref 8.6–10.5)
CHLORIDE SERPL-SCNC: 102 MMOL/L (ref 98–107)
CHOLEST SERPL-MCNC: 178 MG/DL (ref 0–200)
CK SERPL-CCNC: 37 U/L (ref 20–180)
CO2 SERPL-SCNC: 25.4 MMOL/L (ref 22–29)
CREAT SERPL-MCNC: 0.83 MG/DL (ref 0.57–1)
DEPRECATED RDW RBC AUTO: 43.9 FL (ref 37–54)
EOSINOPHIL # BLD AUTO: 0.19 10*3/MM3 (ref 0–0.4)
EOSINOPHIL NFR BLD AUTO: 1.9 % (ref 0.3–6.2)
ERYTHROCYTE [DISTWIDTH] IN BLOOD BY AUTOMATED COUNT: 13 % (ref 12.3–15.4)
GFR SERPL CREATININE-BSD FRML MDRD: 69 ML/MIN/1.73
GLOBULIN UR ELPH-MCNC: 2.7 GM/DL
GLUCOSE SERPL-MCNC: 122 MG/DL (ref 65–99)
HBA1C MFR BLD: 6.07 % (ref 4.8–5.6)
HCT VFR BLD AUTO: 41.1 % (ref 34–46.6)
HDLC SERPL-MCNC: 44 MG/DL (ref 40–60)
HGB BLD-MCNC: 13.3 G/DL (ref 12–15.9)
IMM GRANULOCYTES # BLD AUTO: 0.04 10*3/MM3 (ref 0–0.05)
IMM GRANULOCYTES NFR BLD AUTO: 0.4 % (ref 0–0.5)
LDLC SERPL CALC-MCNC: 101 MG/DL (ref 0–100)
LDLC/HDLC SERPL: 2.16 {RATIO}
LYMPHOCYTES # BLD AUTO: 1.82 10*3/MM3 (ref 0.7–3.1)
LYMPHOCYTES NFR BLD AUTO: 18.1 % (ref 19.6–45.3)
MCH RBC QN AUTO: 29.9 PG (ref 26.6–33)
MCHC RBC AUTO-ENTMCNC: 32.4 G/DL (ref 31.5–35.7)
MCV RBC AUTO: 92.4 FL (ref 79–97)
MONOCYTES # BLD AUTO: 0.64 10*3/MM3 (ref 0.1–0.9)
MONOCYTES NFR BLD AUTO: 6.4 % (ref 5–12)
NEUTROPHILS NFR BLD AUTO: 7.3 10*3/MM3 (ref 1.7–7)
NEUTROPHILS NFR BLD AUTO: 72.5 % (ref 42.7–76)
NRBC BLD AUTO-RTO: 0 /100 WBC (ref 0–0.2)
PLATELET # BLD AUTO: 340 10*3/MM3 (ref 140–450)
PMV BLD AUTO: 10.8 FL (ref 6–12)
POTASSIUM SERPL-SCNC: 4.6 MMOL/L (ref 3.5–5.2)
PROT SERPL-MCNC: 6.7 G/DL (ref 6–8.5)
RBC # BLD AUTO: 4.45 10*6/MM3 (ref 3.77–5.28)
SODIUM SERPL-SCNC: 136 MMOL/L (ref 136–145)
T4 FREE SERPL-MCNC: 1.69 NG/DL (ref 0.93–1.7)
TRIGL SERPL-MCNC: 194 MG/DL (ref 0–150)
TSH SERPL DL<=0.05 MIU/L-ACNC: 4.6 UIU/ML (ref 0.27–4.2)
VLDLC SERPL-MCNC: 33 MG/DL (ref 5–40)
WBC NRBC COR # BLD: 10.06 10*3/MM3 (ref 3.4–10.8)

## 2021-12-27 PROCEDURE — 36415 COLL VENOUS BLD VENIPUNCTURE: CPT

## 2021-12-27 PROCEDURE — 80061 LIPID PANEL: CPT

## 2021-12-27 PROCEDURE — 80053 COMPREHEN METABOLIC PANEL: CPT

## 2021-12-27 PROCEDURE — 82550 ASSAY OF CK (CPK): CPT

## 2021-12-27 PROCEDURE — 84443 ASSAY THYROID STIM HORMONE: CPT

## 2021-12-27 PROCEDURE — 84439 ASSAY OF FREE THYROXINE: CPT

## 2021-12-27 PROCEDURE — 83036 HEMOGLOBIN GLYCOSYLATED A1C: CPT

## 2021-12-27 PROCEDURE — 85025 COMPLETE CBC W/AUTO DIFF WBC: CPT

## 2022-01-05 ENCOUNTER — OFFICE VISIT (OUTPATIENT)
Dept: CARDIOLOGY | Facility: CLINIC | Age: 67
End: 2022-01-05

## 2022-01-05 VITALS
OXYGEN SATURATION: 97 % | WEIGHT: 161 LBS | HEART RATE: 66 BPM | BODY MASS INDEX: 28.53 KG/M2 | SYSTOLIC BLOOD PRESSURE: 180 MMHG | DIASTOLIC BLOOD PRESSURE: 96 MMHG | TEMPERATURE: 97.3 F | HEIGHT: 63 IN

## 2022-01-05 DIAGNOSIS — Z72.0 TOBACCO USE: ICD-10-CM

## 2022-01-05 DIAGNOSIS — I10 PRIMARY HYPERTENSION: Primary | ICD-10-CM

## 2022-01-05 DIAGNOSIS — E78.2 MIXED HYPERLIPIDEMIA: ICD-10-CM

## 2022-01-05 DIAGNOSIS — Z91.09 ENVIRONMENTAL ALLERGIES: ICD-10-CM

## 2022-01-05 DIAGNOSIS — E03.8 OTHER SPECIFIED HYPOTHYROIDISM: ICD-10-CM

## 2022-01-05 DIAGNOSIS — Z23 NEED FOR PROPHYLACTIC VACCINATION AGAINST STREPTOCOCCUS PNEUMONIAE (PNEUMOCOCCUS): ICD-10-CM

## 2022-01-05 DIAGNOSIS — E11.9 TYPE 2 DIABETES MELLITUS WITHOUT COMPLICATION, WITHOUT LONG-TERM CURRENT USE OF INSULIN: ICD-10-CM

## 2022-01-05 PROCEDURE — 90732 PPSV23 VACC 2 YRS+ SUBQ/IM: CPT | Performed by: INTERNAL MEDICINE

## 2022-01-05 PROCEDURE — 99214 OFFICE O/P EST MOD 30 MIN: CPT | Performed by: INTERNAL MEDICINE

## 2022-01-05 PROCEDURE — G0009 ADMIN PNEUMOCOCCAL VACCINE: HCPCS | Performed by: INTERNAL MEDICINE

## 2022-01-05 RX ORDER — LEVOTHYROXINE SODIUM 88 UG/1
88 TABLET ORAL DAILY
Qty: 90 TABLET | Refills: 1 | Status: SHIPPED | OUTPATIENT
Start: 2022-01-05 | End: 2022-07-21 | Stop reason: SDUPTHER

## 2022-01-05 RX ORDER — CYCLOSPORINE 0.5 MG/ML
1 EMULSION OPHTHALMIC 2 TIMES DAILY
COMMUNITY
Start: 2021-12-20

## 2022-01-05 RX ORDER — AMLODIPINE BESYLATE 5 MG/1
5 TABLET ORAL DAILY
Qty: 90 TABLET | Refills: 3 | Status: SHIPPED | OUTPATIENT
Start: 2022-01-05 | End: 2022-01-28 | Stop reason: SINTOL

## 2022-01-05 RX ORDER — LANOLIN ALCOHOL/MO/W.PET/CERES
1000 CREAM (GRAM) TOPICAL DAILY
COMMUNITY

## 2022-01-05 NOTE — PROGRESS NOTES
subjective     Chief Complaint   Patient presents with   • Hypertension     follow up   • Hyperlipidemia     follow up   • Results     labs   • Med Refill     pending     History of Present Illness  Patient is 66 years old white female who is here for checkup of multiple chronic medical conditions.  Her blood pressure has been running high .  Patient however is totally asymptomatic.  She also has hyperlipidemia and has been taking Crestor tolerating medication very well lab work was done which will be reviewed.    Diabetes mellitus on oral hypoglycemic agents.  Hypothyroidism on Synthroid replacement therapy.  Unfortunately she continues to use tobacco and has multiple allergies.    She denies any chest pain palpitations or shortness of breath.    Past Surgical History:   Procedure Laterality Date   • CATARACT EXTRACTION, BILATERAL Bilateral     June and july 2020   • CHOLECYSTECTOMY     • COLONOSCOPY W/ BIOPSIES  2012   • HYSTERECTOMY     • VARICOSE VEIN SURGERY Right      Family History   Problem Relation Age of Onset   • Thyroid cancer Mother    • Aneurysm Mother    • Hypertension Mother    • Diabetes Mother    • Arthritis Mother    • Heart attack Father    • Heart disease Father    • Diabetes Brother    • Kidney failure Brother    • Hypertension Brother    • Diabetes Brother    • Hypertension Brother    • Breast cancer Maternal Aunt      Past Medical History:   Diagnosis Date   • B12 deficiency    • Bronchitis, acute    • Chronic pain syndrome    • COPD (chronic obstructive pulmonary disease) (HCC)    • Diabetes mellitus (HCC)    • Disease of thyroid gland    • Hyperlipidemia    • Hypertension      Patient Active Problem List   Diagnosis   • Acute bronchitis   • B12 deficiency*   • Chronic pain syndrome*   • Hypertension*   • Hyperlipidemia*   • Diabetes mellitus*   • Hypothyroidism*   • Vitamin D deficiency*   • Right ear pain   • Sebaceous cyst   • Chronic bronchitis (HCC)   • Age-related osteoporosis without  current pathological fracture   • Tobacco use   • Environmental allergies       Social History     Tobacco Use   • Smoking status: Current Every Day Smoker     Packs/day: 0.50     Years: 10.00     Pack years: 5.00     Types: Cigarettes   • Smokeless tobacco: Never Used   Vaping Use   • Vaping Use: Never used   Substance Use Topics   • Alcohol use: No   • Drug use: No       Allergies   Allergen Reactions   • Avelox [Moxifloxacin] Hives     Burning itcing whelps allover       Current Outpatient Medications on File Prior to Visit   Medication Sig   • albuterol sulfate  (90 Base) MCG/ACT inhaler Inhale 2 puffs Every 4 (Four) Hours As Needed for Wheezing.   • atorvastatin (LIPITOR) 10 MG tablet Take 1 tablet by mouth Daily. (Patient taking differently: Take 10 mg by mouth Daily. Takes every other day due to leg cramps)   • Breo Ellipta 100-25 MCG/INH inhaler INHALE ONE PUFF BY MOUTH EVERY DAY FOR BREATHING   • Diclofenac Sodium (VOLTAREN) 1 % gel gel Apply 4 g topically to the appropriate area as directed 4 (Four) Times a Day As Needed.   • glucose blood test strip 1 each by Other route Daily.   • hydroxychloroquine (PLAQUENIL) 200 MG tablet Take 200 mg by mouth daily.   • linagliptin (TRADJENTA) 5 MG tablet tablet Take 1 tablet by mouth Daily.   • losartan (COZAAR) 50 MG tablet Take 2 tablets by mouth Daily. for blood pressure   • metoprolol tartrate (LOPRESSOR) 25 MG tablet TAKE ONE TABLET BY MOUTH EVERY 12 HOURS FOR BLOOD PRESSURE   • montelukast (SINGULAIR) 10 MG tablet Take 1 tablet by mouth Every Night.   • ONE TOUCH LANCETS [facility] [place] USES TWICE A DAY TO CHECK BLOOD SUGAR   • Restasis 0.05 % ophthalmic emulsion Administer 1 drop into the left eye 2 (Two) Times a Day.   • vitamin B-12 (CYANOCOBALAMIN) 1000 MCG tablet Take 1,000 mcg by mouth Daily.   • [DISCONTINUED] levothyroxine (SYNTHROID, LEVOTHROID) 88 MCG tablet TAKE ONE TABLET BY MOUTH EVERY MORNING ON AN EMPTY STOMACH FOR THYROID   • [DISCONTINUED]  "metFORMIN (GLUCOPHAGE) 1000 MG tablet Take 1 tablet by mouth 2 (Two) Times a Day Before Meals.   • [DISCONTINUED] Calcium Carb-Cholecalciferol (Os-Gian Calcium + D3) 500-200 MG-UNIT tablet Take one PO daily     No current facility-administered medications on file prior to visit.         The following portions of the patient's history were reviewed and updated as appropriate: allergies, current medications, past family history, past medical history, past social history, past surgical history and problem list.    Review of Systems   Constitutional: Negative.   HENT: Negative.  Negative for congestion.    Eyes: Negative.    Cardiovascular: Negative.  Negative for chest pain, cyanosis, dyspnea on exertion, irregular heartbeat, leg swelling, near-syncope, orthopnea, palpitations, paroxysmal nocturnal dyspnea and syncope.   Respiratory: Negative.  Negative for shortness of breath.    Hematologic/Lymphatic: Negative.    Musculoskeletal: Negative.    Gastrointestinal: Negative.    Neurological: Negative.  Negative for headaches.   Allergic/Immunologic: Positive for environmental allergies.          Objective:     /96 (BP Location: Left arm, Patient Position: Sitting, Cuff Size: Adult)   Pulse 66   Temp 97.3 °F (36.3 °C)   Ht 160 cm (63\")   Wt 73 kg (161 lb)   SpO2 97%   BMI 28.52 kg/m²   Pulmonary:      Effort: Pulmonary effort is normal.      Breath sounds: Normal breath sounds. No stridor. No wheezing. No rhonchi. No rales.   Cardiovascular:      PMI at left midclavicular line. Normal rate. Regular rhythm. Normal S1. Normal S2.      Murmurs: There is no murmur.      No gallop. No click. No rub.   Pulses:     Intact distal pulses.   Edema:     Peripheral edema absent.           Lab Review  Lab Results   Component Value Date     12/27/2021    K 4.6 12/27/2021     12/27/2021    BUN 10 12/27/2021    CREATININE 0.83 12/27/2021    GLUCOSE 122 (H) 12/27/2021    CALCIUM 9.0 12/27/2021    ALT 7 12/27/2021    " ALKPHOS 58 12/27/2021    LABIL2 1.5 04/28/2016     Lab Results   Component Value Date    CKTOTAL 37 12/27/2021     Lab Results   Component Value Date    WBC 10.06 12/27/2021    HGB 13.3 12/27/2021    HCT 41.1 12/27/2021     12/27/2021     No results found for: INR  No results found for: MG  Lab Results   Component Value Date    TSH 4.600 (H) 12/27/2021     No results found for: BNP  Lab Results   Component Value Date    CHLPL 184 04/28/2016    CHOL 178 12/27/2021    TRIG 194 (H) 12/27/2021    HDL 44 12/27/2021    VLDL 33 12/27/2021    LDLHDL 2.16 12/27/2021     Lab Results   Component Value Date     (H) 12/27/2021    LDL 97 08/09/2021       Procedures       I personally viewed and interpreted the patient's LAB data         Assessment:     1. Primary hypertension    2. Other specified hypothyroidism    3. Mixed hyperlipidemia    4. Type 2 diabetes mellitus without complication, without long-term current use of insulin (HCC)    5. Tobacco use    6. Environmental allergies          Plan:     Hypertension    Blood pressure is running high and she was advised to continue Lopressor 25 twice daily along with losartan 100 mg daily.  Norvasc 5 mg daily was added.  She will check her blood pressure daily and call us in about a week.  If blood pressure still high will add [place].    Hypothyroidism,  She is clinically euthyroid.  She is taking Synthroid 88 mcg daily which was continued TSH is mildly elevated.    Diabetes mellitus  Well controlled blood sugar is 122 and hemoglobin A1c is 6.07  She will continue current medication.    Cessation of tobacco use was stressed.  She also has multiple environmental allergies Singulair was continued.  COPD Breo Ellipta and albuterol HFA continued.  Pneumonia shot 23 Valent was given  Follow-up scheduled      No follow-ups on file.

## 2022-01-10 ENCOUNTER — TELEPHONE (OUTPATIENT)
Dept: CARDIOLOGY | Facility: CLINIC | Age: 67
End: 2022-01-10

## 2022-01-10 RX ORDER — AMOXICILLIN 500 MG/1
500 CAPSULE ORAL 3 TIMES DAILY
Qty: 21 CAPSULE | Refills: 0 | Status: SHIPPED | OUTPATIENT
Start: 2022-01-10 | End: 2022-01-17

## 2022-01-10 RX ORDER — CETIRIZINE HYDROCHLORIDE 10 MG/1
10 TABLET ORAL DAILY
Qty: 30 TABLET | Refills: 1 | Status: SHIPPED | OUTPATIENT
Start: 2022-01-10 | End: 2022-07-05 | Stop reason: ALTCHOICE

## 2022-01-10 NOTE — TELEPHONE ENCOUNTER
Patient requests medication be called into pharmacy for a drippy nose.  Patient states she has no fever, no aches just stuffy and runny nose.

## 2022-01-28 ENCOUNTER — OFFICE VISIT (OUTPATIENT)
Dept: CARDIOLOGY | Facility: CLINIC | Age: 67
End: 2022-01-28

## 2022-01-28 ENCOUNTER — LAB (OUTPATIENT)
Dept: LAB | Facility: HOSPITAL | Age: 67
End: 2022-01-28

## 2022-01-28 VITALS
BODY MASS INDEX: 29.59 KG/M2 | DIASTOLIC BLOOD PRESSURE: 92 MMHG | WEIGHT: 167 LBS | HEIGHT: 63 IN | HEART RATE: 68 BPM | SYSTOLIC BLOOD PRESSURE: 150 MMHG | TEMPERATURE: 97.7 F | OXYGEN SATURATION: 98 %

## 2022-01-28 DIAGNOSIS — S22.42XS CLOSED FRACTURE OF MULTIPLE RIBS OF LEFT SIDE, SEQUELA: ICD-10-CM

## 2022-01-28 DIAGNOSIS — R07.89 CHEST WALL PAIN: Primary | ICD-10-CM

## 2022-01-28 DIAGNOSIS — I50.22 CHRONIC SYSTOLIC HEART FAILURE: ICD-10-CM

## 2022-01-28 DIAGNOSIS — J42 CHRONIC BRONCHITIS, UNSPECIFIED CHRONIC BRONCHITIS TYPE: ICD-10-CM

## 2022-01-28 DIAGNOSIS — J20.9 ACUTE BRONCHITIS, UNSPECIFIED ORGANISM: ICD-10-CM

## 2022-01-28 DIAGNOSIS — E78.2 MIXED HYPERLIPIDEMIA: ICD-10-CM

## 2022-01-28 DIAGNOSIS — I10 PRIMARY HYPERTENSION: ICD-10-CM

## 2022-01-28 DIAGNOSIS — R06.02 SOB (SHORTNESS OF BREATH): ICD-10-CM

## 2022-01-28 DIAGNOSIS — R60.0 EDEMA LEG: ICD-10-CM

## 2022-01-28 PROBLEM — S22.42XA CLOSED FRACTURE OF MULTIPLE RIBS OF LEFT SIDE: Status: ACTIVE | Noted: 2022-01-28

## 2022-01-28 LAB
ALBUMIN SERPL-MCNC: 4.1 G/DL (ref 3.5–5.2)
ALBUMIN/GLOB SERPL: 1.3 G/DL
ALP SERPL-CCNC: 77 U/L (ref 39–117)
ALT SERPL W P-5'-P-CCNC: 7 U/L (ref 1–33)
ANION GAP SERPL CALCULATED.3IONS-SCNC: 9.3 MMOL/L (ref 5–15)
AST SERPL-CCNC: 8 U/L (ref 1–32)
BASOPHILS # BLD AUTO: 0.06 10*3/MM3 (ref 0–0.2)
BASOPHILS NFR BLD AUTO: 0.7 % (ref 0–1.5)
BILIRUB SERPL-MCNC: 0.3 MG/DL (ref 0–1.2)
BUN SERPL-MCNC: 14 MG/DL (ref 8–23)
BUN/CREAT SERPL: 17.5 (ref 7–25)
CALCIUM SPEC-SCNC: 9.3 MG/DL (ref 8.6–10.5)
CHLORIDE SERPL-SCNC: 105 MMOL/L (ref 98–107)
CO2 SERPL-SCNC: 24.7 MMOL/L (ref 22–29)
CREAT SERPL-MCNC: 0.8 MG/DL (ref 0.57–1)
DEPRECATED RDW RBC AUTO: 46.6 FL (ref 37–54)
EOSINOPHIL # BLD AUTO: 0.16 10*3/MM3 (ref 0–0.4)
EOSINOPHIL NFR BLD AUTO: 1.7 % (ref 0.3–6.2)
ERYTHROCYTE [DISTWIDTH] IN BLOOD BY AUTOMATED COUNT: 13.5 % (ref 12.3–15.4)
GFR SERPL CREATININE-BSD FRML MDRD: 72 ML/MIN/1.73
GLOBULIN UR ELPH-MCNC: 3.2 GM/DL
GLUCOSE SERPL-MCNC: 107 MG/DL (ref 65–99)
HCT VFR BLD AUTO: 39.9 % (ref 34–46.6)
HGB BLD-MCNC: 13.2 G/DL (ref 12–15.9)
IMM GRANULOCYTES # BLD AUTO: 0.02 10*3/MM3 (ref 0–0.05)
IMM GRANULOCYTES NFR BLD AUTO: 0.2 % (ref 0–0.5)
LYMPHOCYTES # BLD AUTO: 1.64 10*3/MM3 (ref 0.7–3.1)
LYMPHOCYTES NFR BLD AUTO: 17.8 % (ref 19.6–45.3)
MCH RBC QN AUTO: 30.8 PG (ref 26.6–33)
MCHC RBC AUTO-ENTMCNC: 33.1 G/DL (ref 31.5–35.7)
MCV RBC AUTO: 93 FL (ref 79–97)
MONOCYTES # BLD AUTO: 0.65 10*3/MM3 (ref 0.1–0.9)
MONOCYTES NFR BLD AUTO: 7 % (ref 5–12)
NEUTROPHILS NFR BLD AUTO: 6.69 10*3/MM3 (ref 1.7–7)
NEUTROPHILS NFR BLD AUTO: 72.6 % (ref 42.7–76)
NRBC BLD AUTO-RTO: 0 /100 WBC (ref 0–0.2)
NT-PROBNP SERPL-MCNC: 270 PG/ML (ref 0–900)
PLATELET # BLD AUTO: 393 10*3/MM3 (ref 140–450)
PMV BLD AUTO: 10.3 FL (ref 6–12)
POTASSIUM SERPL-SCNC: 4.9 MMOL/L (ref 3.5–5.2)
PROT SERPL-MCNC: 7.3 G/DL (ref 6–8.5)
RBC # BLD AUTO: 4.29 10*6/MM3 (ref 3.77–5.28)
SARS-COV-2 RNA PNL SPEC NAA+PROBE: NOT DETECTED
SODIUM SERPL-SCNC: 139 MMOL/L (ref 136–145)
WBC NRBC COR # BLD: 9.22 10*3/MM3 (ref 3.4–10.8)

## 2022-01-28 PROCEDURE — 93000 ELECTROCARDIOGRAM COMPLETE: CPT | Performed by: INTERNAL MEDICINE

## 2022-01-28 PROCEDURE — 83880 ASSAY OF NATRIURETIC PEPTIDE: CPT

## 2022-01-28 PROCEDURE — U0004 COV-19 TEST NON-CDC HGH THRU: HCPCS | Performed by: INTERNAL MEDICINE

## 2022-01-28 PROCEDURE — 85025 COMPLETE CBC W/AUTO DIFF WBC: CPT

## 2022-01-28 PROCEDURE — 99214 OFFICE O/P EST MOD 30 MIN: CPT | Performed by: INTERNAL MEDICINE

## 2022-01-28 PROCEDURE — 80053 COMPREHEN METABOLIC PANEL: CPT

## 2022-01-28 PROCEDURE — 36415 COLL VENOUS BLD VENIPUNCTURE: CPT

## 2022-01-28 RX ORDER — TRAMADOL HYDROCHLORIDE 50 MG/1
25 TABLET ORAL EVERY 8 HOURS PRN
Qty: 60 TABLET | Refills: 0 | Status: SHIPPED | OUTPATIENT
Start: 2022-01-28 | End: 2022-10-04

## 2022-01-28 RX ORDER — CEFDINIR 300 MG/1
300 CAPSULE ORAL 2 TIMES DAILY
Qty: 14 CAPSULE | Refills: 0 | Status: SHIPPED | OUTPATIENT
Start: 2022-01-28 | End: 2022-02-24

## 2022-01-28 RX ORDER — FUROSEMIDE 20 MG/1
20 TABLET ORAL DAILY
Qty: 30 TABLET | Refills: 0 | Status: SHIPPED | OUTPATIENT
Start: 2022-01-28 | End: 2022-04-05

## 2022-01-28 NOTE — PROGRESS NOTES
subjective     Chief Complaint   Patient presents with   • Edema     in feet and legs    • Fall     pt fell on landed on left side. went to urgent care.    • Hypertension     pt stopped taking norvasc      History of Present Illness  Patient is 66 years old white female who states that she fell on January 13, 2022.  Since then she has been having coughing and hurting on the left lower rib cage.  She had x-ray done on January 18, 2022 and was found to have ninth and 10th rib fracture.  Her problems actually started on January 10, 2022  when she complained of drippy nose, stuffiness and chest congestion. she took Zyrtec and amoxicillin at that time.  she fell on the 13th and sustained fractures.    She complains of shortness of breath coughing and wheezing  She also complains of swelling in the right lower extremity which is painless.  She was started on Norvasc for blood pressure control but she continued because of leg swelling and dizziness.    Patient is not vaccinated    She is having a lot of coughing and wheezing but no fever or chills.  Mainly she complains of chest wall pain leg swelling and a lot of coughing.  Patient has prior history of COPD and has been taking albuterol and Breo Ellipta    Past Surgical History:   Procedure Laterality Date   • CATARACT EXTRACTION, BILATERAL Bilateral     June and july 2020   • CHOLECYSTECTOMY     • COLONOSCOPY W/ BIOPSIES  2012   • HYSTERECTOMY     • VARICOSE VEIN SURGERY Right      Family History   Problem Relation Age of Onset   • Thyroid cancer Mother    • Aneurysm Mother    • Hypertension Mother    • Diabetes Mother    • Arthritis Mother    • Heart attack Father    • Heart disease Father    • Diabetes Brother    • Kidney failure Brother    • Hypertension Brother    • Diabetes Brother    • Hypertension Brother    • Breast cancer Maternal Aunt      Past Medical History:   Diagnosis Date   • B12 deficiency    • Bronchitis, acute    • Chronic pain syndrome    • COPD  (chronic obstructive pulmonary disease) (HCC)    • Diabetes mellitus (HCC)    • Disease of thyroid gland    • Hyperlipidemia    • Hypertension      Patient Active Problem List   Diagnosis   • Acute bronchitis   • B12 deficiency*   • Chronic pain syndrome*   • Hypertension*   • Hyperlipidemia*   • Diabetes mellitus*   • Hypothyroidism*   • Vitamin D deficiency*   • Right ear pain   • Sebaceous cyst   • Chronic bronchitis (HCC)   • Age-related osteoporosis without current pathological fracture   • Tobacco use   • Environmental allergies   • Edema leg   • Chronic systolic heart failure (HCC)   • Closed fracture of multiple ribs of left side   • Chest wall pain       Social History     Tobacco Use   • Smoking status: Current Every Day Smoker     Packs/day: 0.50     Years: 10.00     Pack years: 5.00     Types: Cigarettes   • Smokeless tobacco: Never Used   Vaping Use   • Vaping Use: Never used   Substance Use Topics   • Alcohol use: No   • Drug use: No       Allergies   Allergen Reactions   • Avelox [Moxifloxacin] Hives     Burning itcing whelps allover       Current Outpatient Medications on File Prior to Visit   Medication Sig   • albuterol sulfate  (90 Base) MCG/ACT inhaler Inhale 2 puffs Every 4 (Four) Hours As Needed for Wheezing.   • atorvastatin (LIPITOR) 10 MG tablet Take 1 tablet by mouth Daily. (Patient taking differently: Take 10 mg by mouth Daily. Takes every other day due to leg cramps)   • Breo Ellipta 100-25 MCG/INH inhaler INHALE ONE PUFF BY MOUTH EVERY DAY FOR BREATHING   • cetirizine (zyrTEC) 10 MG tablet Take 1 tablet by mouth Daily.   • Diclofenac Sodium (VOLTAREN) 1 % gel gel Apply 4 g topically to the appropriate area as directed 4 (Four) Times a Day As Needed.   • glucose blood test strip 1 each by Other route Daily.   • hydroxychloroquine (PLAQUENIL) 200 MG tablet Take 200 mg by mouth daily.   • levothyroxine (SYNTHROID, LEVOTHROID) 88 MCG tablet Take 1 tablet by mouth Daily.   •  "linagliptin (TRADJENTA) 5 MG tablet tablet Take 1 tablet by mouth Daily.   • losartan (COZAAR) 50 MG tablet Take 2 tablets by mouth Daily. for blood pressure   • metFORMIN (GLUCOPHAGE) 1000 MG tablet Take 1 tablet by mouth 2 (Two) Times a Day Before Meals.   • metoprolol tartrate (LOPRESSOR) 25 MG tablet TAKE ONE TABLET BY MOUTH EVERY 12 HOURS FOR BLOOD PRESSURE   • montelukast (SINGULAIR) 10 MG tablet Take 1 tablet by mouth Every Night.   • ONE TOUCH LANCETS [facility] [place] USES TWICE A DAY TO CHECK BLOOD SUGAR   • Restasis 0.05 % ophthalmic emulsion Administer 1 drop into the left eye 2 (Two) Times a Day.   • vitamin B-12 (CYANOCOBALAMIN) 1000 MCG tablet Take 1,000 mcg by mouth Daily.   • [DISCONTINUED] amLODIPine (NORVASC) 5 MG tablet Take 1 tablet by mouth Daily.     No current facility-administered medications on file prior to visit.         The following portions of the patient's history were reviewed and updated as appropriate: allergies, current medications, past family history, past medical history, past social history, past surgical history and problem list.    Review of Systems   Constitutional: Negative.   HENT: Negative.  Negative for congestion.    Eyes: Negative.    Cardiovascular: Positive for chest pain. Negative for cyanosis, dyspnea on exertion, irregular heartbeat, leg swelling, near-syncope, orthopnea, palpitations, paroxysmal nocturnal dyspnea and syncope.   Respiratory: Positive for cough, shortness of breath and wheezing.    Hematologic/Lymphatic: Negative.    Musculoskeletal: Negative.    Gastrointestinal: Negative.    Neurological: Negative.  Negative for headaches.          Objective:     /92 (BP Location: Left arm, Patient Position: Sitting, Cuff Size: Adult)   Pulse 68   Temp 97.7 °F (36.5 °C)   Ht 160 cm (63\")   Wt 75.8 kg (167 lb)   SpO2 98%   BMI 29.58 kg/m²   Pulmonary:      Effort: Pulmonary effort is normal.      Breath sounds: Wheezing present. No rales.   Cardiovascular:      " PMI at left midclavicular line. Normal rate. Regular rhythm. Normal S1. Normal S2.      Murmurs: There is no murmur.      No gallop. No click. No rub.   Pulses:     Intact distal pulses.   Edema:     Peripheral edema absent.           Lab Review  Lab Results   Component Value Date     12/27/2021    K 4.6 12/27/2021     12/27/2021    BUN 10 12/27/2021    CREATININE 0.83 12/27/2021    GLUCOSE 122 (H) 12/27/2021    CALCIUM 9.0 12/27/2021    ALT 7 12/27/2021    ALKPHOS 58 12/27/2021    LABIL2 1.5 04/28/2016     Lab Results   Component Value Date    CKTOTAL 37 12/27/2021     Lab Results   Component Value Date    WBC 10.06 12/27/2021    HGB 13.3 12/27/2021    HCT 41.1 12/27/2021     12/27/2021     No results found for: INR  No results found for: MG  Lab Results   Component Value Date    TSH 4.600 (H) 12/27/2021     No results found for: BNP  Lab Results   Component Value Date    CHLPL 184 04/28/2016    CHOL 178 12/27/2021    TRIG 194 (H) 12/27/2021    HDL 44 12/27/2021    VLDL 33 12/27/2021    LDLHDL 2.16 12/27/2021     Lab Results   Component Value Date     (H) 12/27/2021    LDL 97 08/09/2021         ECG 12 Lead    Date/Time: 1/28/2022 11:32 AM  Performed by: Evita Rosario MD  Authorized by: Evita Rosario MD   Comparison: compared with previous ECG from 1/3/2014  Similar to previous ECG  Rhythm: sinus rhythm  BPM: 68  Conduction: incomplete right bundle branch block  Other findings: non-specific ST-T wave changes    Clinical impression: non-specific ECG               I personally viewed and interpreted the patient's LAB data         Assessment:     1. Chest wall pain    2. Primary hypertension    3. Mixed hyperlipidemia    4. Acute bronchitis, unspecified organism    5. Chronic bronchitis, unspecified chronic bronchitis type (HCC)    6. Edema leg    7. Chronic systolic heart failure (HCC)    8. SOB (shortness of breath)    9. Closed fracture of multiple ribs of left side,  sequela          Plan:     Patient is 66 years old white female who recently fell and sustained fracture of the ninth and 10th rib on the left.  She has been complaining of a lot of pain in that area and also has been coughing and wheezing.  She has prior history of COPD and appears to have exacerbation of the COPD.  She also complains of right lower extremity edema.  She has no fever but has lot of cough.  She was given Ultram for pain control  She will continue Breo Ellipta and albuterol for COPD exacerbation  Omnicef was also prescribed.  She will take Lasix 20 mg daily for lower extremity edema.    Repeat chest x-ray, Covid testing and lab work was scheduled.  We will also check ultrasound of the lower extremity to rule out DVT although is nontender and Homans' sign is negative.  Patient has lot of wheezing and because of leg swelling will check proBNP also.  EKG today does not show any acute changes.  Follow-up scheduled        No follow-ups on file.

## 2022-01-31 DIAGNOSIS — R60.0 EDEMA LEG: Primary | ICD-10-CM

## 2022-02-17 ENCOUNTER — HOSPITAL ENCOUNTER (OUTPATIENT)
Dept: GENERAL RADIOLOGY | Facility: HOSPITAL | Age: 67
Discharge: HOME | End: 2022-02-17

## 2022-02-17 ENCOUNTER — HOSPITAL ENCOUNTER (OUTPATIENT)
Dept: CARDIOLOGY | Facility: HOSPITAL | Age: 67
Discharge: HOME | End: 2022-02-17

## 2022-02-17 ENCOUNTER — TELEPHONE (OUTPATIENT)
Dept: CARDIOLOGY | Facility: CLINIC | Age: 67
End: 2022-02-17

## 2022-02-17 DIAGNOSIS — J42 CHRONIC BRONCHITIS, UNSPECIFIED CHRONIC BRONCHITIS TYPE: ICD-10-CM

## 2022-02-17 DIAGNOSIS — R60.0 EDEMA LEG: ICD-10-CM

## 2022-02-17 DIAGNOSIS — R06.02 SOB (SHORTNESS OF BREATH): ICD-10-CM

## 2022-02-17 LAB
BH CV ECHO MEAS - % IVS THICK: 26 %
BH CV ECHO MEAS - % LVPW THICK: 41.4 %
BH CV ECHO MEAS - ACS: 1.8 CM
BH CV ECHO MEAS - AO MAX PG: 8.9 MMHG
BH CV ECHO MEAS - AO MEAN PG: 4 MMHG
BH CV ECHO MEAS - AO ROOT AREA (BSA CORRECTED): 1.7
BH CV ECHO MEAS - AO ROOT AREA: 7.5 CM^2
BH CV ECHO MEAS - AO ROOT DIAM: 3.1 CM
BH CV ECHO MEAS - AO V2 MAX: 149 CM/SEC
BH CV ECHO MEAS - AO V2 MEAN: 92.8 CM/SEC
BH CV ECHO MEAS - AO V2 VTI: 31.5 CM
BH CV ECHO MEAS - BSA(HAYCOCK): 1.9 M^2
BH CV ECHO MEAS - BSA: 1.8 M^2
BH CV ECHO MEAS - BZI_BMI: 29.6 KILOGRAMS/M^2
BH CV ECHO MEAS - BZI_METRIC_HEIGHT: 160 CM
BH CV ECHO MEAS - BZI_METRIC_WEIGHT: 75.8 KG
BH CV ECHO MEAS - EDV(CUBED): 102.8 ML
BH CV ECHO MEAS - EDV(MOD-SP4): 28 ML
BH CV ECHO MEAS - EDV(TEICH): 101.6 ML
BH CV ECHO MEAS - EF(CUBED): 51.9 %
BH CV ECHO MEAS - EF(MOD-SP4): 61.4 %
BH CV ECHO MEAS - EF(TEICH): 43.9 %
BH CV ECHO MEAS - ESV(CUBED): 49.4 ML
BH CV ECHO MEAS - ESV(MOD-SP4): 10.8 ML
BH CV ECHO MEAS - ESV(TEICH): 57 ML
BH CV ECHO MEAS - FS: 21.7 %
BH CV ECHO MEAS - IVS/LVPW: 0.91
BH CV ECHO MEAS - IVSD: 0.97 CM
BH CV ECHO MEAS - IVSS: 1.2 CM
BH CV ECHO MEAS - LA DIMENSION: 3.5 CM
BH CV ECHO MEAS - LA/AO: 1.1
BH CV ECHO MEAS - LV DIASTOLIC VOL/BSA (35-75): 15.6 ML/M^2
BH CV ECHO MEAS - LV MASS(C)D: 167.6 GRAMS
BH CV ECHO MEAS - LV MASS(C)DI: 93.6 GRAMS/M^2
BH CV ECHO MEAS - LV MASS(C)S: 177.6 GRAMS
BH CV ECHO MEAS - LV MASS(C)SI: 99.1 GRAMS/M^2
BH CV ECHO MEAS - LV SYSTOLIC VOL/BSA (12-30): 6 ML/M^2
BH CV ECHO MEAS - LVIDD: 4.7 CM
BH CV ECHO MEAS - LVIDS: 3.7 CM
BH CV ECHO MEAS - LVLD AP4: 6.2 CM
BH CV ECHO MEAS - LVLS AP4: 5.3 CM
BH CV ECHO MEAS - LVOT AREA (M): 2.5 CM^2
BH CV ECHO MEAS - LVOT AREA: 2.5 CM^2
BH CV ECHO MEAS - LVOT DIAM: 1.8 CM
BH CV ECHO MEAS - LVPWD: 1.1 CM
BH CV ECHO MEAS - LVPWS: 1.5 CM
BH CV ECHO MEAS - MV A MAX VEL: 75.8 CM/SEC
BH CV ECHO MEAS - MV E MAX VEL: 60.4 CM/SEC
BH CV ECHO MEAS - MV E/A: 0.8
BH CV ECHO MEAS - PA ACC TIME: 0.05 SEC
BH CV ECHO MEAS - PA PR(ACCEL): 55.2 MMHG
BH CV ECHO MEAS - RAP SYSTOLE: 10 MMHG
BH CV ECHO MEAS - RVSP: 38.8 MMHG
BH CV ECHO MEAS - SI(AO): 132.7 ML/M^2
BH CV ECHO MEAS - SI(CUBED): 29.8 ML/M^2
BH CV ECHO MEAS - SI(MOD-SP4): 9.6 ML/M^2
BH CV ECHO MEAS - SI(TEICH): 24.9 ML/M^2
BH CV ECHO MEAS - SV(AO): 237.8 ML
BH CV ECHO MEAS - SV(CUBED): 53.4 ML
BH CV ECHO MEAS - SV(MOD-SP4): 17.2 ML
BH CV ECHO MEAS - SV(TEICH): 44.6 ML
BH CV ECHO MEAS - TR MAX VEL: 267.5 CM/SEC
MAXIMAL PREDICTED HEART RATE: 153 BPM
MAXIMAL PREDICTED HEART RATE: 153 BPM
STRESS TARGET HR: 130 BPM
STRESS TARGET HR: 130 BPM

## 2022-02-17 PROCEDURE — 93970 EXTREMITY STUDY: CPT

## 2022-02-17 PROCEDURE — 71046 X-RAY EXAM CHEST 2 VIEWS: CPT | Performed by: RADIOLOGY

## 2022-02-17 PROCEDURE — 93306 TTE W/DOPPLER COMPLETE: CPT | Performed by: INTERNAL MEDICINE

## 2022-02-17 PROCEDURE — 93970 EXTREMITY STUDY: CPT | Performed by: RADIOLOGY

## 2022-02-17 PROCEDURE — 71046 X-RAY EXAM CHEST 2 VIEWS: CPT

## 2022-02-17 PROCEDURE — 93306 TTE W/DOPPLER COMPLETE: CPT

## 2022-02-17 NOTE — TELEPHONE ENCOUNTER
----- Message from Evita Rosario MD sent at 2/17/2022 12:43 PM EST -----  Cardiac echo, chest x-ray and Doppler study for legs, all normal

## 2022-02-24 ENCOUNTER — OFFICE VISIT (OUTPATIENT)
Dept: CARDIOLOGY | Facility: CLINIC | Age: 67
End: 2022-02-24

## 2022-02-24 VITALS
BODY MASS INDEX: 28.17 KG/M2 | SYSTOLIC BLOOD PRESSURE: 114 MMHG | HEIGHT: 63 IN | DIASTOLIC BLOOD PRESSURE: 70 MMHG | OXYGEN SATURATION: 95 % | WEIGHT: 159 LBS | TEMPERATURE: 96.9 F | HEART RATE: 66 BPM

## 2022-02-24 DIAGNOSIS — Z72.0 TOBACCO USE: ICD-10-CM

## 2022-02-24 DIAGNOSIS — R07.89 CHEST WALL PAIN: ICD-10-CM

## 2022-02-24 DIAGNOSIS — S22.42XS CLOSED FRACTURE OF MULTIPLE RIBS OF LEFT SIDE, SEQUELA: Primary | ICD-10-CM

## 2022-02-24 DIAGNOSIS — E03.9 HYPOTHYROIDISM, UNSPECIFIED TYPE: ICD-10-CM

## 2022-02-24 DIAGNOSIS — E78.2 MIXED HYPERLIPIDEMIA: ICD-10-CM

## 2022-02-24 DIAGNOSIS — J42 CHRONIC BRONCHITIS, UNSPECIFIED CHRONIC BRONCHITIS TYPE: ICD-10-CM

## 2022-02-24 DIAGNOSIS — I10 PRIMARY HYPERTENSION: ICD-10-CM

## 2022-02-24 PROCEDURE — 99214 OFFICE O/P EST MOD 30 MIN: CPT | Performed by: INTERNAL MEDICINE

## 2022-02-24 NOTE — PROGRESS NOTES
subjective     Chief Complaint   Patient presents with   • Chest Pain     follow up   • Results     heart echo and us venous doppler     History of Present Illness  Add is 66 years old white female who fell and sustained multiple rib fractures the left lower rib cage.  This happened about a month ago.  She is feeling better now still hurts when she starts taking a deep breath but overall is much better.  She denies any cough fever or chills.  No shortness of breath.  Chest pain overall is significantly better.    Hypertension  Blood pressure is better controlled.  She is taking Lopressor and losartan.    Hypothyroidism  Patient is clinically euthyroid and is taking Synthroid 88 mcg daily.  TSH is mildly elevated.    Complains of right shoulder pain but range of motion is perfectly normal.  She states it is not too bad but blames it on arthritis.  She is using Voltaren gel and also is taking Plaquenil and Ultram.  She had echocardiogram and venous Doppler done which will be reviewed.  Ankle edema is gone.    Past Surgical History:   Procedure Laterality Date   • CATARACT EXTRACTION, BILATERAL Bilateral     June and july 2020   • CHOLECYSTECTOMY     • COLONOSCOPY W/ BIOPSIES  2012   • HYSTERECTOMY     • VARICOSE VEIN SURGERY Right      Family History   Problem Relation Age of Onset   • Thyroid cancer Mother    • Aneurysm Mother    • Hypertension Mother    • Diabetes Mother    • Arthritis Mother    • Heart attack Father    • Heart disease Father    • Diabetes Brother    • Kidney failure Brother    • Hypertension Brother    • Diabetes Brother    • Hypertension Brother    • Breast cancer Maternal Aunt      Past Medical History:   Diagnosis Date   • B12 deficiency    • Bronchitis, acute    • Chronic pain syndrome    • COPD (chronic obstructive pulmonary disease) (HCC)    • Diabetes mellitus (HCC)    • Disease of thyroid gland    • Hyperlipidemia    • Hypertension      Patient Active Problem List   Diagnosis   • Acute  bronchitis   • B12 deficiency*   • Chronic pain syndrome*   • Hypertension*   • Hyperlipidemia*   • Diabetes mellitus*   • Hypothyroidism*   • Vitamin D deficiency*   • Right ear pain   • Sebaceous cyst   • Chronic bronchitis (HCC)   • Age-related osteoporosis without current pathological fracture   • Tobacco use   • Environmental allergies   • Edema leg   • Chronic systolic heart failure (HCC)   • Closed fracture of multiple ribs of left side   • Chest wall pain       Social History     Tobacco Use   • Smoking status: Current Every Day Smoker     Packs/day: 0.50     Years: 10.00     Pack years: 5.00     Types: Cigarettes   • Smokeless tobacco: Never Used   Vaping Use   • Vaping Use: Never used   Substance Use Topics   • Alcohol use: No   • Drug use: No       Allergies   Allergen Reactions   • Avelox [Moxifloxacin] Hives     Burning itcing whelps allover       Current Outpatient Medications on File Prior to Visit   Medication Sig   • albuterol sulfate  (90 Base) MCG/ACT inhaler Inhale 2 puffs Every 4 (Four) Hours As Needed for Wheezing.   • atorvastatin (LIPITOR) 10 MG tablet Take 1 tablet by mouth Daily. (Patient taking differently: Take 10 mg by mouth Daily. Takes every other day due to leg cramps)   • Breo Ellipta 100-25 MCG/INH inhaler INHALE ONE PUFF BY MOUTH EVERY DAY FOR BREATHING   • cetirizine (zyrTEC) 10 MG tablet Take 1 tablet by mouth Daily.   • Diclofenac Sodium (VOLTAREN) 1 % gel gel Apply 4 g topically to the appropriate area as directed 4 (Four) Times a Day As Needed.   • furosemide (LASIX) 20 MG tablet Take 1 tablet by mouth Daily.   • glucose blood test strip 1 each by Other route Daily.   • hydroxychloroquine (PLAQUENIL) 200 MG tablet Take 200 mg by mouth daily.   • levothyroxine (SYNTHROID, LEVOTHROID) 88 MCG tablet Take 1 tablet by mouth Daily.   • linagliptin (TRADJENTA) 5 MG tablet tablet Take 1 tablet by mouth Daily.   • losartan (COZAAR) 50 MG tablet Take 2 tablets by mouth Daily. for  "blood pressure   • metFORMIN (GLUCOPHAGE) 1000 MG tablet Take 1 tablet by mouth 2 (Two) Times a Day Before Meals.   • metoprolol tartrate (LOPRESSOR) 25 MG tablet TAKE ONE TABLET BY MOUTH EVERY 12 HOURS FOR BLOOD PRESSURE   • montelukast (SINGULAIR) 10 MG tablet Take 1 tablet by mouth Every Night.   • ONE TOUCH LANCETS [facility] USES TWICE A DAY TO CHECK BLOOD SUGAR   • Restasis 0.05 % ophthalmic emulsion Administer 1 drop into the left eye 2 (Two) Times a Day.   • traMADol (Ultram) 50 MG tablet Take 0.5 tablets by mouth Every 8 (Eight) Hours As Needed for Moderate Pain .   • vitamin B-12 (CYANOCOBALAMIN) 1000 MCG tablet Take 1,000 mcg by mouth Daily.   • [DISCONTINUED] cefdinir (OMNICEF) 300 MG capsule Take 1 capsule by mouth 2 (Two) Times a Day.     No current facility-administered medications on file prior to visit.         The following portions of the patient's history were reviewed and updated as appropriate: allergies, current medications, past family history, past medical history, past social history, past surgical history and problem list.    Review of Systems   Constitutional: Negative.   HENT: Negative.  Negative for congestion.    Eyes: Negative.    Cardiovascular: Negative.  Negative for chest pain, cyanosis, dyspnea on exertion, irregular heartbeat, leg swelling, near-syncope, orthopnea, palpitations, paroxysmal nocturnal dyspnea and syncope.        Chest wall pain   Respiratory: Negative.  Negative for shortness of breath.    Hematologic/Lymphatic: Negative.    Musculoskeletal: Positive for arthritis and joint pain.   Gastrointestinal: Negative.    Neurological: Negative.  Negative for headaches.          Objective:     /70 (BP Location: Left arm, Patient Position: Sitting, Cuff Size: Adult)   Pulse 66   Temp 96.9 °F (36.1 °C)   Ht 160 cm (63\")   Wt 72.1 kg (159 lb)   SpO2 95%   BMI 28.17 kg/m²   Pulmonary:      Effort: Pulmonary effort is normal.      Breath sounds: Normal breath sounds. No " stridor. No wheezing. No rhonchi. No rales.   Cardiovascular:      PMI at left midclavicular line. Normal rate. Regular rhythm. Normal S1. Normal S2.      Murmurs: There is no murmur.      No gallop. No click. No rub.   Pulses:     Intact distal pulses.   Edema:     Peripheral edema absent.           Lab Review  Lab Results   Component Value Date     01/28/2022    K 4.9 01/28/2022     01/28/2022    BUN 14 01/28/2022    CREATININE 0.80 01/28/2022    GLUCOSE 107 (H) 01/28/2022    CALCIUM 9.3 01/28/2022    ALT 7 01/28/2022    ALKPHOS 77 01/28/2022    LABIL2 1.5 04/28/2016     Lab Results   Component Value Date    CKTOTAL 37 12/27/2021     Lab Results   Component Value Date    WBC 9.22 01/28/2022    HGB 13.2 01/28/2022    HCT 39.9 01/28/2022     01/28/2022     No results found for: INR  No results found for: MG  Lab Results   Component Value Date    TSH 4.600 (H) 12/27/2021     No results found for: BNP  Lab Results   Component Value Date    CHLPL 184 04/28/2016    CHOL 178 12/27/2021    TRIG 194 (H) 12/27/2021    HDL 44 12/27/2021    VLDL 33 12/27/2021    LDLHDL 2.16 12/27/2021     Lab Results   Component Value Date     (H) 12/27/2021    LDL 97 08/09/2021       Procedures       I personally viewed and interpreted the patient's LAB data         Assessment:     1. Closed fracture of multiple ribs of left side, sequela    2. Chest wall pain    3. Tobacco use    4. Chronic bronchitis, unspecified chronic bronchitis type (HCC)    5. Hypothyroidism, unspecified type    6. Primary hypertension    7. Mixed hyperlipidemia          Plan:     Patient is here for follow-up of multiple chronic medical problems.  She has history of multiple rib fractures left anterior chest and complains of chest wall pain however is significantly better.  Blood pressure is very well controlled.  She has COPD and is taking albuterol, Zyrtec and is breathing better.  Blood sugar is better with the Metformin and  Tradjenta  Hyperlipidemia she was advised to continue Lipitor    She also complains of arthritis she will continue current medication.  Follow-up scheduled    No follow-ups on file.

## 2022-04-05 ENCOUNTER — OFFICE VISIT (OUTPATIENT)
Dept: CARDIOLOGY | Facility: CLINIC | Age: 67
End: 2022-04-05

## 2022-04-05 VITALS
DIASTOLIC BLOOD PRESSURE: 94 MMHG | SYSTOLIC BLOOD PRESSURE: 164 MMHG | TEMPERATURE: 97.8 F | HEART RATE: 63 BPM | BODY MASS INDEX: 29.06 KG/M2 | WEIGHT: 164 LBS | OXYGEN SATURATION: 97 % | HEIGHT: 63 IN

## 2022-04-05 DIAGNOSIS — E03.9 HYPOTHYROIDISM, UNSPECIFIED TYPE: ICD-10-CM

## 2022-04-05 DIAGNOSIS — I10 PRIMARY HYPERTENSION: Primary | ICD-10-CM

## 2022-04-05 DIAGNOSIS — E11.9 TYPE 2 DIABETES MELLITUS WITHOUT COMPLICATION, WITHOUT LONG-TERM CURRENT USE OF INSULIN: ICD-10-CM

## 2022-04-05 DIAGNOSIS — Z72.0 TOBACCO USE: ICD-10-CM

## 2022-04-05 DIAGNOSIS — M81.0 AGE-RELATED OSTEOPOROSIS WITHOUT CURRENT PATHOLOGICAL FRACTURE: ICD-10-CM

## 2022-04-05 DIAGNOSIS — S22.42XS CLOSED FRACTURE OF MULTIPLE RIBS OF LEFT SIDE, SEQUELA: ICD-10-CM

## 2022-04-05 DIAGNOSIS — Z91.09 ENVIRONMENTAL ALLERGIES: ICD-10-CM

## 2022-04-05 DIAGNOSIS — E55.9 VITAMIN D DEFICIENCY: ICD-10-CM

## 2022-04-05 DIAGNOSIS — E78.2 MIXED HYPERLIPIDEMIA: ICD-10-CM

## 2022-04-05 DIAGNOSIS — Z12.11 SCREENING FOR COLON CANCER: ICD-10-CM

## 2022-04-05 DIAGNOSIS — J42 CHRONIC BRONCHITIS, UNSPECIFIED CHRONIC BRONCHITIS TYPE: ICD-10-CM

## 2022-04-05 PROCEDURE — 99214 OFFICE O/P EST MOD 30 MIN: CPT | Performed by: INTERNAL MEDICINE

## 2022-04-05 PROCEDURE — 93000 ELECTROCARDIOGRAM COMPLETE: CPT | Performed by: INTERNAL MEDICINE

## 2022-04-05 RX ORDER — CHLORTHALIDONE 25 MG/1
12.5 TABLET ORAL DAILY
Qty: 45 TABLET | Refills: 1 | Status: SHIPPED | OUTPATIENT
Start: 2022-04-05 | End: 2022-10-04 | Stop reason: SDUPTHER

## 2022-04-05 NOTE — PROGRESS NOTES
subjective     Chief Complaint   Patient presents with   • Chest Pain     Follow up   • Hypertension       Today    • Med Refill     pending     History of Present Illness  Ada is 66 years old white female who had multiple rib fractures left rib cage.  She is feeling better but her pain is practically gone.  She still slightly sore.    Blood pressure is elevated  She states that she stopped her amlodipine because it was making her dizzy.  She is taking Lopressor 25 twice daily and losartan 50 twice daily.  She was advised to start Hygroton 12.5 mg daily and keep a check on blood pressure.    Ankle edema is gone and she is no longer taking Lasix.    Unfortunately she continues to smoke.    Patient has diabetes mellitus and is taking Metformin 1000 twice daily and Tradjenta 5 mg daily.  Hypothyroidism on Synthroid 88 mcg daily.  Chronic back and leg pains patient is taking Ultram 25 every 8 hours as needed and is compliant with medications.  Multiple environmental allergies and COPD she is taking albuterol Breo Ellipta Zyrtec and Singulair.  Overall she is doing better  Past Surgical History:   Procedure Laterality Date   • CATARACT EXTRACTION, BILATERAL Bilateral     June and july 2020   • CHOLECYSTECTOMY     • COLONOSCOPY W/ BIOPSIES  2012   • HYSTERECTOMY     • VARICOSE VEIN SURGERY Right      Family History   Problem Relation Age of Onset   • Thyroid cancer Mother    • Aneurysm Mother    • Hypertension Mother    • Diabetes Mother    • Arthritis Mother    • Heart attack Father    • Heart disease Father    • Diabetes Brother    • Kidney failure Brother    • Hypertension Brother    • Diabetes Brother    • Hypertension Brother    • Breast cancer Maternal Aunt      Past Medical History:   Diagnosis Date   • B12 deficiency    • Bronchitis, acute    • Chronic pain syndrome    • COPD (chronic obstructive pulmonary disease) (HCC)    • Diabetes mellitus (HCC)    • Disease of thyroid gland    • Hyperlipidemia    •  Hypertension      Patient Active Problem List   Diagnosis   • Acute bronchitis   • B12 deficiency*   • Chronic pain syndrome*   • Hypertension*   • Hyperlipidemia*   • Diabetes mellitus*   • Hypothyroidism*   • Vitamin D deficiency*   • Right ear pain   • Sebaceous cyst   • Chronic bronchitis (HCC)   • Age-related osteoporosis without current pathological fracture   • Tobacco use   • Environmental allergies   • Edema leg   • Chronic systolic heart failure (HCC)   • Closed fracture of multiple ribs of left side   • Chest wall pain       Social History     Tobacco Use   • Smoking status: Current Every Day Smoker     Packs/day: 0.50     Years: 10.00     Pack years: 5.00     Types: Cigarettes   • Smokeless tobacco: Never Used   Vaping Use   • Vaping Use: Never used   Substance Use Topics   • Alcohol use: No   • Drug use: No       Allergies   Allergen Reactions   • Avelox [Moxifloxacin] Hives     Burning itcing whelps allover       Current Outpatient Medications on File Prior to Visit   Medication Sig   • albuterol sulfate  (90 Base) MCG/ACT inhaler Inhale 2 puffs Every 4 (Four) Hours As Needed for Wheezing.   • atorvastatin (LIPITOR) 10 MG tablet Take 1 tablet by mouth Daily. (Patient taking differently: Take 10 mg by mouth Daily. Takes every other day due to leg cramps)   • Breo Ellipta 100-25 MCG/INH inhaler INHALE ONE PUFF BY MOUTH EVERY DAY FOR BREATHING   • cetirizine (zyrTEC) 10 MG tablet Take 1 tablet by mouth Daily.   • Diclofenac Sodium (VOLTAREN) 1 % gel gel Apply 4 g topically to the appropriate area as directed 4 (Four) Times a Day As Needed.   • hydroxychloroquine (PLAQUENIL) 200 MG tablet Take 200 mg by mouth daily.   • levothyroxine (SYNTHROID, LEVOTHROID) 88 MCG tablet Take 1 tablet by mouth Daily.   • losartan (COZAAR) 50 MG tablet Take 2 tablets by mouth Daily. for blood pressure   • metFORMIN (GLUCOPHAGE) 1000 MG tablet Take 1 tablet by mouth 2 (Two) Times a Day Before Meals.   • metoprolol  "tartrate (LOPRESSOR) 25 MG tablet TAKE ONE TABLET BY MOUTH EVERY 12 HOURS FOR BLOOD PRESSURE   • montelukast (SINGULAIR) 10 MG tablet Take 1 tablet by mouth Every Night.   • ONE TOUCH LANCETS [facility] USES TWICE A DAY TO CHECK BLOOD SUGAR   • Restasis 0.05 % ophthalmic emulsion Administer 1 drop into the left eye 2 (Two) Times a Day.   • traMADol (Ultram) 50 MG tablet Take 0.5 tablets by mouth Every 8 (Eight) Hours As Needed for Moderate Pain .   • vitamin B-12 (CYANOCOBALAMIN) 1000 MCG tablet Take 1,000 mcg by mouth Daily.   • [DISCONTINUED] furosemide (LASIX) 20 MG tablet Take 1 tablet by mouth Daily.   • [DISCONTINUED] glucose blood test strip 1 each by Other route Daily.   • [DISCONTINUED] linagliptin (TRADJENTA) 5 MG tablet tablet Take 1 tablet by mouth Daily.     No current facility-administered medications on file prior to visit.         The following portions of the patient's history were reviewed and updated as appropriate: allergies, current medications, past family history, past medical history, past social history, past surgical history and problem list.    Review of Systems   Constitutional: Negative.   HENT: Negative.  Negative for congestion.    Eyes: Negative.    Cardiovascular: Negative.  Negative for chest pain, cyanosis, dyspnea on exertion, irregular heartbeat, leg swelling, near-syncope, orthopnea, palpitations, paroxysmal nocturnal dyspnea and syncope.   Respiratory: Negative.  Negative for shortness of breath.    Hematologic/Lymphatic: Negative.    Musculoskeletal: Negative.    Gastrointestinal: Negative.    Neurological: Negative.  Negative for headaches.          Objective:     /94 (BP Location: Left arm, Patient Position: Sitting, Cuff Size: Adult)   Pulse 63   Temp 97.8 °F (36.6 °C)   Ht 160 cm (63\")   Wt 74.4 kg (164 lb)   SpO2 97%   BMI 29.05 kg/m²   Pulmonary:      Effort: Pulmonary effort is normal.      Breath sounds: Normal breath sounds. No stridor. No wheezing. No rhonchi. " No rales.   Cardiovascular:      PMI at left midclavicular line. Normal rate. Regular rhythm. Normal S1. Normal S2.      Murmurs: There is no murmur.      No gallop. No click. No rub.   Pulses:     Intact distal pulses.   Edema:     Peripheral edema absent.           Lab Review  Lab Results   Component Value Date     01/28/2022    K 4.9 01/28/2022     01/28/2022    BUN 14 01/28/2022    CREATININE 0.80 01/28/2022    GLUCOSE 107 (H) 01/28/2022    CALCIUM 9.3 01/28/2022    ALT 7 01/28/2022    ALKPHOS 77 01/28/2022    LABIL2 1.5 04/28/2016     Lab Results   Component Value Date    CKTOTAL 37 12/27/2021     Lab Results   Component Value Date    WBC 9.22 01/28/2022    HGB 13.2 01/28/2022    HCT 39.9 01/28/2022     01/28/2022     No results found for: INR  No results found for: MG  Lab Results   Component Value Date    TSH 4.600 (H) 12/27/2021     No results found for: BNP  Lab Results   Component Value Date    CHLPL 184 04/28/2016    CHOL 178 12/27/2021    TRIG 194 (H) 12/27/2021    HDL 44 12/27/2021    VLDL 33 12/27/2021    LDLHDL 2.16 12/27/2021     Lab Results   Component Value Date     (H) 12/27/2021    LDL 97 08/09/2021         ECG 12 Lead    Date/Time: 4/5/2022 12:26 PM  Performed by: Evita Rosario MD  Authorized by: Evita Rosario MD   Comparison: compared with previous ECG from 1/28/2022  Comparison to previous ECG: Nonspecific ST changes are new  Rhythm: sinus rhythm  Rate: normal  BPM: 63  Conduction: right bundle branch block  QRS axis: normal  Other findings: non-specific ST-T wave changes    Clinical impression: abnormal EKG               I personally viewed and interpreted the patient's LAB data         Assessment:     1. Primary hypertension    2. Type 2 diabetes mellitus without complication, without long-term current use of insulin (HCC)    3. Screening for colon cancer    4. Mixed hyperlipidemia    5. Closed fracture of multiple ribs of left side, sequela     6. Hypothyroidism, unspecified type    7. Tobacco use    8. Vitamin D deficiency*    9. Age-related osteoporosis without current pathological fracture    10. Chronic bronchitis, unspecified chronic bronchitis type (HCC)    11. Environmental allergies          Plan:     Hypertension  Blood pressure significantly elevated patient stopped Norvasc.  He was advised to continue Lopressor and losartan.  She was started on Hygroton 12.5 mg daily.  She will check her blood pressure twice a day and call for further instructions.  Salt restriction was stressed.  She also smokes cessation of tobacco use stressed.  EKG shows chronic right bundle branch block with nonspecific ST changes which are new.  Patient is asymptomatic however.    Diabetes mellitus  Patient will continue Metformin and Tradjenta.  Weight loss emphasized.    GI consult was arranged screening for colonoscopy discussed.  Hyperlipidemia Lipitor was continued unfortunately patient is taking only half the dose because of leg cramps.    Thyroid functions are mildly abnormal she will continue Synthroid lab work scheduled.  Patient also has COPD and environmental allergies she will continue Breo Ellipta albuterol and Zyrtec.  Healthy life emphasized follow-up scheduled        No follow-ups on file.

## 2022-04-12 DIAGNOSIS — Z12.11 ENCOUNTER FOR SCREENING FOR MALIGNANT NEOPLASM OF COLON: Primary | ICD-10-CM

## 2022-06-02 ENCOUNTER — APPOINTMENT (OUTPATIENT)
Dept: LAB | Facility: HOSPITAL | Age: 67
End: 2022-06-02

## 2022-06-03 ENCOUNTER — LAB (OUTPATIENT)
Dept: LAB | Facility: HOSPITAL | Age: 67
End: 2022-06-03

## 2022-06-03 DIAGNOSIS — Z12.11 ENCOUNTER FOR SCREENING FOR MALIGNANT NEOPLASM OF COLON: ICD-10-CM

## 2022-06-03 LAB — SARS-COV-2 RNA PNL SPEC NAA+PROBE: NOT DETECTED

## 2022-06-03 PROCEDURE — U0004 COV-19 TEST NON-CDC HGH THRU: HCPCS

## 2022-06-03 PROCEDURE — C9803 HOPD COVID-19 SPEC COLLECT: HCPCS

## 2022-06-07 ENCOUNTER — ANESTHESIA (OUTPATIENT)
Dept: PERIOP | Facility: HOSPITAL | Age: 67
End: 2022-06-07

## 2022-06-07 ENCOUNTER — HOSPITAL ENCOUNTER (OUTPATIENT)
Facility: HOSPITAL | Age: 67
Setting detail: HOSPITAL OUTPATIENT SURGERY
Discharge: HOME | End: 2022-06-07
Attending: INTERNAL MEDICINE | Admitting: INTERNAL MEDICINE

## 2022-06-07 ENCOUNTER — ANESTHESIA EVENT (OUTPATIENT)
Dept: PERIOP | Facility: HOSPITAL | Age: 67
End: 2022-06-07

## 2022-06-07 VITALS
TEMPERATURE: 97.8 F | HEIGHT: 62 IN | HEART RATE: 59 BPM | SYSTOLIC BLOOD PRESSURE: 134 MMHG | BODY MASS INDEX: 30.36 KG/M2 | OXYGEN SATURATION: 98 % | DIASTOLIC BLOOD PRESSURE: 79 MMHG | RESPIRATION RATE: 20 BRPM | WEIGHT: 165 LBS

## 2022-06-07 DIAGNOSIS — Z12.11 ENCOUNTER FOR SCREENING FOR MALIGNANT NEOPLASM OF COLON: ICD-10-CM

## 2022-06-07 LAB — GLUCOSE BLDC GLUCOMTR-MCNC: 120 MG/DL (ref 70–130)

## 2022-06-07 PROCEDURE — 25010000002 PROPOFOL 10 MG/ML EMULSION: Performed by: NURSE ANESTHETIST, CERTIFIED REGISTERED

## 2022-06-07 PROCEDURE — 82962 GLUCOSE BLOOD TEST: CPT

## 2022-06-07 PROCEDURE — S0260 H&P FOR SURGERY: HCPCS | Performed by: PHYSICIAN ASSISTANT

## 2022-06-07 PROCEDURE — 88305 TISSUE EXAM BY PATHOLOGIST: CPT

## 2022-06-07 PROCEDURE — 45385 COLONOSCOPY W/LESION REMOVAL: CPT | Performed by: INTERNAL MEDICINE

## 2022-06-07 RX ORDER — ONDANSETRON 2 MG/ML
4 INJECTION INTRAMUSCULAR; INTRAVENOUS AS NEEDED
Status: DISCONTINUED | OUTPATIENT
Start: 2022-06-07 | End: 2022-06-07 | Stop reason: HOSPADM

## 2022-06-07 RX ORDER — OXYCODONE HYDROCHLORIDE AND ACETAMINOPHEN 5; 325 MG/1; MG/1
1 TABLET ORAL ONCE AS NEEDED
Status: DISCONTINUED | OUTPATIENT
Start: 2022-06-07 | End: 2022-06-07 | Stop reason: HOSPADM

## 2022-06-07 RX ORDER — MIDAZOLAM HYDROCHLORIDE 1 MG/ML
0.5 INJECTION INTRAMUSCULAR; INTRAVENOUS
Status: DISCONTINUED | OUTPATIENT
Start: 2022-06-07 | End: 2022-06-07 | Stop reason: HOSPADM

## 2022-06-07 RX ORDER — SODIUM CHLORIDE 0.9 % (FLUSH) 0.9 %
10 SYRINGE (ML) INJECTION AS NEEDED
Status: DISCONTINUED | OUTPATIENT
Start: 2022-06-07 | End: 2022-06-07 | Stop reason: HOSPADM

## 2022-06-07 RX ORDER — SODIUM CHLORIDE, SODIUM LACTATE, POTASSIUM CHLORIDE, CALCIUM CHLORIDE 600; 310; 30; 20 MG/100ML; MG/100ML; MG/100ML; MG/100ML
125 INJECTION, SOLUTION INTRAVENOUS ONCE
Status: DISCONTINUED | OUTPATIENT
Start: 2022-06-07 | End: 2022-06-07 | Stop reason: HOSPADM

## 2022-06-07 RX ORDER — SODIUM CHLORIDE, SODIUM LACTATE, POTASSIUM CHLORIDE, CALCIUM CHLORIDE 600; 310; 30; 20 MG/100ML; MG/100ML; MG/100ML; MG/100ML
INJECTION, SOLUTION INTRAVENOUS CONTINUOUS PRN
Status: DISCONTINUED | OUTPATIENT
Start: 2022-06-07 | End: 2022-06-07 | Stop reason: SURG

## 2022-06-07 RX ORDER — PROPOFOL 10 MG/ML
VIAL (ML) INTRAVENOUS AS NEEDED
Status: DISCONTINUED | OUTPATIENT
Start: 2022-06-07 | End: 2022-06-07 | Stop reason: SURG

## 2022-06-07 RX ORDER — SODIUM CHLORIDE, SODIUM LACTATE, POTASSIUM CHLORIDE, CALCIUM CHLORIDE 600; 310; 30; 20 MG/100ML; MG/100ML; MG/100ML; MG/100ML
100 INJECTION, SOLUTION INTRAVENOUS ONCE AS NEEDED
Status: DISCONTINUED | OUTPATIENT
Start: 2022-06-07 | End: 2022-06-07 | Stop reason: HOSPADM

## 2022-06-07 RX ORDER — SODIUM CHLORIDE 0.9 % (FLUSH) 0.9 %
10 SYRINGE (ML) INJECTION EVERY 12 HOURS SCHEDULED
Status: DISCONTINUED | OUTPATIENT
Start: 2022-06-07 | End: 2022-06-07 | Stop reason: HOSPADM

## 2022-06-07 RX ORDER — MEPERIDINE HYDROCHLORIDE 25 MG/ML
12.5 INJECTION INTRAMUSCULAR; INTRAVENOUS; SUBCUTANEOUS
Status: DISCONTINUED | OUTPATIENT
Start: 2022-06-07 | End: 2022-06-07 | Stop reason: HOSPADM

## 2022-06-07 RX ORDER — IPRATROPIUM BROMIDE AND ALBUTEROL SULFATE 2.5; .5 MG/3ML; MG/3ML
3 SOLUTION RESPIRATORY (INHALATION) ONCE AS NEEDED
Status: DISCONTINUED | OUTPATIENT
Start: 2022-06-07 | End: 2022-06-07 | Stop reason: HOSPADM

## 2022-06-07 RX ORDER — LIDOCAINE HYDROCHLORIDE 20 MG/ML
INJECTION, SOLUTION INFILTRATION; PERINEURAL AS NEEDED
Status: DISCONTINUED | OUTPATIENT
Start: 2022-06-07 | End: 2022-06-07 | Stop reason: SURG

## 2022-06-07 RX ORDER — FENTANYL CITRATE 50 UG/ML
50 INJECTION, SOLUTION INTRAMUSCULAR; INTRAVENOUS
Status: DISCONTINUED | OUTPATIENT
Start: 2022-06-07 | End: 2022-06-07 | Stop reason: HOSPADM

## 2022-06-07 RX ADMIN — SODIUM CHLORIDE, POTASSIUM CHLORIDE, SODIUM LACTATE AND CALCIUM CHLORIDE: 600; 310; 30; 20 INJECTION, SOLUTION INTRAVENOUS at 10:08

## 2022-06-07 RX ADMIN — PROPOFOL 100 MCG/KG/MIN: 10 INJECTION, EMULSION INTRAVENOUS at 10:11

## 2022-06-07 RX ADMIN — LIDOCAINE HYDROCHLORIDE 60 MG: 20 INJECTION, SOLUTION INFILTRATION; PERINEURAL at 10:11

## 2022-06-07 RX ADMIN — PROPOFOL 100 MG: 10 INJECTION, EMULSION INTRAVENOUS at 10:11

## 2022-06-07 NOTE — ANESTHESIA PREPROCEDURE EVALUATION
Anesthesia Evaluation     Patient summary reviewed and Nursing notes reviewed   no history of anesthetic complications:  NPO Solid Status: > 8 hours  NPO Liquid Status: > 8 hours           Airway   Mallampati: I  TM distance: >3 FB  Neck ROM: full  Dental    (+) upper dentures, lower dentures and edentulous    Pulmonary     breath sounds clear to auscultation  (+) COPD moderate,   Cardiovascular   Exercise tolerance: good (4-7 METS)    Rhythm: regular  Rate: normal    (+) hypertension, hyperlipidemia,       Neuro/Psych- negative ROS  GI/Hepatic/Renal/Endo    (+)   diabetes mellitus, thyroid problem     Musculoskeletal (-) negative ROS    Abdominal     Abdomen: soft.   Substance History - negative use     OB/GYN negative ob/gyn ROS         Other - negative ROS                       Anesthesia Plan    ASA 3     general     intravenous induction     Anesthetic plan, risks, benefits, and alternatives have been provided, discussed and informed consent has been obtained with: patient.  Use of blood products discussed with consented to blood products.   Plan discussed with CRNA.        CODE STATUS:

## 2022-06-07 NOTE — H&P
Chief complaint colorectal cancer screening    Subjective     Patient is a 67 y.o. female who presents today for a screening colonoscopy.   The patient denies all GI symptoms, including rectal bleeding, black stools, heartburn, dysphagia symptoms, nausea, vomiting, abdominal pain, and constipation.  She reports intermittent diarrhea but thinks it is from her gallbladder. Family history is negative for GI disease.  Medical, surgical, and social histories were reviewed and are listed below.      Review of Systems  Review of Systems - General ROS: negative for - weight loss  Psychological ROS: negative for - behavioral disorder  Ophthalmic ROS: negative for - dry eyes  ENT ROS: negative for - vertigo or vocal changes  Hematological and Lymphatic ROS: negative for - jaundice or swollen lymph nodes  Respiratory ROS: negative for - sputum changes or stridor  Cardiovascular ROS: negative for - irregular heartbeat or murmur  Gastrointestinal ROS: negative for - blood in stools or change in stools  Genitourinary ROS: negative for - hematuria or incontinence  Musculoskeletal ROS: negative for - gait disturbance      History  Past Medical History:   Diagnosis Date   • B12 deficiency    • Bronchitis, acute    • Chronic pain syndrome    • COPD (chronic obstructive pulmonary disease) (HCC)    • Diabetes mellitus (HCC)    • Disease of thyroid gland    • Hyperlipidemia    • Hypertension      Past Surgical History:   Procedure Laterality Date   • CATARACT EXTRACTION, BILATERAL Bilateral     June and july 2020   • CHOLECYSTECTOMY     • COLONOSCOPY W/ BIOPSIES  2012   • HYSTERECTOMY     • VARICOSE VEIN SURGERY Right      Family History   Problem Relation Age of Onset   • Thyroid cancer Mother    • Aneurysm Mother    • Hypertension Mother    • Diabetes Mother    • Arthritis Mother    • Heart attack Father    • Heart disease Father    • Diabetes Brother    • Kidney failure Brother    • Hypertension Brother    • Diabetes Brother     • Hypertension Brother    • Breast cancer Maternal Aunt      Social History     Tobacco Use   • Smoking status: Current Every Day Smoker     Packs/day: 0.50     Years: 10.00     Pack years: 5.00     Types: Cigarettes   • Smokeless tobacco: Never Used   Vaping Use   • Vaping Use: Never used   Substance Use Topics   • Alcohol use: No   • Drug use: No     Medications Prior to Admission   Medication Sig Dispense Refill Last Dose   • albuterol sulfate  (90 Base) MCG/ACT inhaler Inhale 2 puffs Every 4 (Four) Hours As Needed for Wheezing. 6.7 g 3    • atorvastatin (LIPITOR) 10 MG tablet Take 1 tablet by mouth Daily. (Patient taking differently: Take 10 mg by mouth Daily. Takes every other day due to leg cramps) 90 tablet 3    • Breo Ellipta 100-25 MCG/INH inhaler INHALE ONE PUFF BY MOUTH EVERY DAY FOR BREATHING 60 each 12    • cetirizine (zyrTEC) 10 MG tablet Take 1 tablet by mouth Daily. 30 tablet 1    • chlorthalidone (HYGROTON) 25 MG tablet Take 0.5 tablets by mouth Daily. 45 tablet 1    • Diclofenac Sodium (VOLTAREN) 1 % gel gel Apply 4 g topically to the appropriate area as directed 4 (Four) Times a Day As Needed.      • glucose blood test strip 1 each by Other route Daily. 100 each 2    • hydroxychloroquine (PLAQUENIL) 200 MG tablet Take 200 mg by mouth daily.      • levothyroxine (SYNTHROID, LEVOTHROID) 88 MCG tablet Take 1 tablet by mouth Daily. 90 tablet 1    • linagliptin (TRADJENTA) 5 MG tablet tablet Take 1 tablet by mouth Daily. 90 tablet 1    • losartan (COZAAR) 50 MG tablet Take 2 tablets by mouth Daily. for blood pressure 180 tablet 2    • metFORMIN (GLUCOPHAGE) 1000 MG tablet Take 1 tablet by mouth 2 (Two) Times a Day Before Meals. 180 tablet 1    • metoprolol tartrate (LOPRESSOR) 25 MG tablet TAKE ONE TABLET BY MOUTH EVERY 12 HOURS FOR BLOOD PRESSURE 180 tablet 3    • montelukast (SINGULAIR) 10 MG tablet Take 1 tablet by mouth Every Night. 90 tablet 3    • ONE TOUCH LANCETS misc USES TWICE A DAY  TO CHECK BLOOD SUGAR 180 each 1    • Restasis 0.05 % ophthalmic emulsion Administer 1 drop into the left eye 2 (Two) Times a Day.      • traMADol (Ultram) 50 MG tablet Take 0.5 tablets by mouth Every 8 (Eight) Hours As Needed for Moderate Pain . 60 tablet 0    • vitamin B-12 (CYANOCOBALAMIN) 1000 MCG tablet Take 1,000 mcg by mouth Daily.        Allergies:  Avelox [moxifloxacin]    Objective     Vital Signs       Physical Exam  General:  This is a WD pleasant female in no acute distress  Vital signs: Stable, afebrile  HEENT exam:  WNL. Sclerae are anicteric.  EOMI  Neck: Supple, FROM.  No JVD.  Trachea midline  Lungs:  Respiratory effort normal. Auscultation: Clear, without wheezes, rhonchi, rales  Heart:  Regular rate and rhythm, without murmur, gallop, rub.  No pedal edema  Abdomen:  No tenderness or palpable masses;  Bowel sounds normal  Musculoskeletal: Muscle strength/tone is normal.    Psych:  Alert, oriented x 3.  Mood and affect are appropriate  Skin:  Warm with good turgor.  Without rash or lesion  Extremities:  Examination of the extremities revealed no cyanosis, clubbing or edema.    Results Review:                     Invalid input(s): PROTCrCl cannot be calculated (Patient's most recent lab result is older than the maximum 30 days allowed.).  No results found for: AMMONIA      No results found for: BLOODCX  No results found for: URINECX  No results found for: WOUNDCX  No results found for: STOOLCX    Imaging:  Imaging Results (Last 24 Hours)     ** No results found for the last 24 hours. **                Impression:  Patient Active Problem List   Diagnosis Code   • Acute bronchitis J20.9   • B12 deficiency* E53.8   • Chronic pain syndrome* G89.4   • Hypertension* I10   • Hyperlipidemia* E78.5   • Diabetes mellitus* E11.9   • Hypothyroidism* E03.9   • Vitamin D deficiency* E55.9   • Right ear pain H92.01   • Sebaceous cyst L72.3   • Chronic bronchitis (HCC) J42   • Age-related osteoporosis without  current pathological fracture M81.0   • Tobacco use Z72.0   • Environmental allergies Z91.09   • Edema leg R60.0   • Chronic systolic heart failure (HCC) I50.22   • Closed fracture of multiple ribs of left side S22.42XA   • Chest wall pain R07.89   • Encounter for screening for malignant neoplasm of colon Z12.11       Assessment:  1.  Colorectal cancer screening      Plan:  The patient will undergo a screening colonoscopy today.  She voiced understanding and agreement of procedure.    MADELINE Thompson  06/07/22  07:43 EDT

## 2022-06-07 NOTE — ANESTHESIA POSTPROCEDURE EVALUATION
Patient: Leonarda Diaz    Procedure Summary     Date: 06/07/22 Room / Location:  [facility] 07 /  [facility]    Anesthesia Start: 1008 Anesthesia Stop: 1031    Procedure: COLONOSCOPY FOR SCREENING (N/A ) Diagnosis:       Encounter for screening for malignant neoplasm of colon      (Encounter for screening for malignant neoplasm of colon [Z12.11])    Surgeons: Loren Leigh MD Provider: Sha Bernard MD    Anesthesia Type: general ASA Status: 3          Anesthesia Type: general    Vitals  Vitals Value Taken Time   /79 06/07/22 1105   Temp 97.8 °F (36.6 °C) 06/07/22 1105   Pulse 59 06/07/22 1050   Resp 20 06/07/22 1105   SpO2 98 % 06/07/22 1105           Post Anesthesia Care and Evaluation    Patient location during evaluation: PACU  Patient participation: complete - patient participated  Level of consciousness: awake  Pain score: 1  Pain management: adequate    Airway patency: patent  Anesthetic complications: No anesthetic complications  PONV Status: controlled  Cardiovascular status: acceptable and blood pressure returned to baseline  Respiratory status: acceptable and room air  Hydration status: acceptable    Comments: Patient comfortable with discharge at this time.

## 2022-06-10 LAB — REF LAB TEST METHOD: NORMAL

## 2022-06-13 NOTE — PROGRESS NOTES
At the time of your recent colonoscopy, 6 polyps were removed from the colon.  All of the polyps were tubular adenomas which are considered precancerous polyps.  You will need repeat colonoscopy in 5 years.  Thank you for allowing me to participate in your care.

## 2022-06-17 ENCOUNTER — LAB (OUTPATIENT)
Dept: LAB | Facility: HOSPITAL | Age: 67
End: 2022-06-17

## 2022-06-17 DIAGNOSIS — E78.2 MIXED HYPERLIPIDEMIA: ICD-10-CM

## 2022-06-17 DIAGNOSIS — E55.9 VITAMIN D DEFICIENCY: ICD-10-CM

## 2022-06-17 DIAGNOSIS — E11.9 TYPE 2 DIABETES MELLITUS WITHOUT COMPLICATION, WITHOUT LONG-TERM CURRENT USE OF INSULIN: ICD-10-CM

## 2022-06-17 DIAGNOSIS — E03.9 HYPOTHYROIDISM, UNSPECIFIED TYPE: ICD-10-CM

## 2022-06-17 LAB
25(OH)D3 SERPL-MCNC: 27.4 NG/ML (ref 30–100)
ALBUMIN SERPL-MCNC: 4.3 G/DL (ref 3.5–5.2)
ALBUMIN UR-MCNC: <1.2 MG/DL
ALBUMIN/GLOB SERPL: 1.7 G/DL
ALP SERPL-CCNC: 66 U/L (ref 39–117)
ALT SERPL W P-5'-P-CCNC: 8 U/L (ref 1–33)
ANION GAP SERPL CALCULATED.3IONS-SCNC: 12.3 MMOL/L (ref 5–15)
AST SERPL-CCNC: 12 U/L (ref 1–32)
BASOPHILS # BLD AUTO: 0.06 10*3/MM3 (ref 0–0.2)
BASOPHILS NFR BLD AUTO: 0.7 % (ref 0–1.5)
BILIRUB SERPL-MCNC: 0.3 MG/DL (ref 0–1.2)
BUN SERPL-MCNC: 14 MG/DL (ref 8–23)
BUN/CREAT SERPL: 18.7 (ref 7–25)
CALCIUM SPEC-SCNC: 9.3 MG/DL (ref 8.6–10.5)
CHLORIDE SERPL-SCNC: 98 MMOL/L (ref 98–107)
CHOLEST SERPL-MCNC: 168 MG/DL (ref 0–200)
CK SERPL-CCNC: 25 U/L (ref 20–180)
CO2 SERPL-SCNC: 26.7 MMOL/L (ref 22–29)
CREAT SERPL-MCNC: 0.75 MG/DL (ref 0.57–1)
DEPRECATED RDW RBC AUTO: 41.5 FL (ref 37–54)
EGFRCR SERPLBLD CKD-EPI 2021: 86.8 ML/MIN/1.73
EOSINOPHIL # BLD AUTO: 0.19 10*3/MM3 (ref 0–0.4)
EOSINOPHIL NFR BLD AUTO: 2.3 % (ref 0.3–6.2)
ERYTHROCYTE [DISTWIDTH] IN BLOOD BY AUTOMATED COUNT: 12.8 % (ref 12.3–15.4)
GLOBULIN UR ELPH-MCNC: 2.5 GM/DL
GLUCOSE SERPL-MCNC: 113 MG/DL (ref 65–99)
HBA1C MFR BLD: 6.2 % (ref 4.8–5.6)
HCT VFR BLD AUTO: 39.2 % (ref 34–46.6)
HDLC SERPL-MCNC: 50 MG/DL (ref 40–60)
HGB BLD-MCNC: 13.2 G/DL (ref 12–15.9)
IMM GRANULOCYTES # BLD AUTO: 0.03 10*3/MM3 (ref 0–0.05)
IMM GRANULOCYTES NFR BLD AUTO: 0.4 % (ref 0–0.5)
LDLC SERPL CALC-MCNC: 91 MG/DL (ref 0–100)
LDLC/HDLC SERPL: 1.75 {RATIO}
LYMPHOCYTES # BLD AUTO: 1.47 10*3/MM3 (ref 0.7–3.1)
LYMPHOCYTES NFR BLD AUTO: 18 % (ref 19.6–45.3)
MCH RBC QN AUTO: 30.2 PG (ref 26.6–33)
MCHC RBC AUTO-ENTMCNC: 33.7 G/DL (ref 31.5–35.7)
MCV RBC AUTO: 89.7 FL (ref 79–97)
MONOCYTES # BLD AUTO: 0.64 10*3/MM3 (ref 0.1–0.9)
MONOCYTES NFR BLD AUTO: 7.8 % (ref 5–12)
NEUTROPHILS NFR BLD AUTO: 5.78 10*3/MM3 (ref 1.7–7)
NEUTROPHILS NFR BLD AUTO: 70.8 % (ref 42.7–76)
NRBC BLD AUTO-RTO: 0 /100 WBC (ref 0–0.2)
PLATELET # BLD AUTO: 345 10*3/MM3 (ref 140–450)
PMV BLD AUTO: 10.6 FL (ref 6–12)
POTASSIUM SERPL-SCNC: 5 MMOL/L (ref 3.5–5.2)
PROT SERPL-MCNC: 6.8 G/DL (ref 6–8.5)
RBC # BLD AUTO: 4.37 10*6/MM3 (ref 3.77–5.28)
SODIUM SERPL-SCNC: 137 MMOL/L (ref 136–145)
T4 FREE SERPL-MCNC: 1.71 NG/DL (ref 0.93–1.7)
TRIGL SERPL-MCNC: 153 MG/DL (ref 0–150)
TSH SERPL DL<=0.05 MIU/L-ACNC: 1.83 UIU/ML (ref 0.27–4.2)
VLDLC SERPL-MCNC: 27 MG/DL (ref 5–40)
WBC NRBC COR # BLD: 8.17 10*3/MM3 (ref 3.4–10.8)

## 2022-06-17 PROCEDURE — 80061 LIPID PANEL: CPT

## 2022-06-17 PROCEDURE — 84443 ASSAY THYROID STIM HORMONE: CPT

## 2022-06-17 PROCEDURE — 85025 COMPLETE CBC W/AUTO DIFF WBC: CPT

## 2022-06-17 PROCEDURE — 80053 COMPREHEN METABOLIC PANEL: CPT

## 2022-06-17 PROCEDURE — 82043 UR ALBUMIN QUANTITATIVE: CPT

## 2022-06-17 PROCEDURE — 36415 COLL VENOUS BLD VENIPUNCTURE: CPT

## 2022-06-17 PROCEDURE — 82550 ASSAY OF CK (CPK): CPT

## 2022-06-17 PROCEDURE — 84439 ASSAY OF FREE THYROXINE: CPT

## 2022-06-17 PROCEDURE — 83036 HEMOGLOBIN GLYCOSYLATED A1C: CPT

## 2022-06-17 PROCEDURE — 82306 VITAMIN D 25 HYDROXY: CPT

## 2022-07-05 ENCOUNTER — OFFICE VISIT (OUTPATIENT)
Dept: CARDIOLOGY | Facility: CLINIC | Age: 67
End: 2022-07-05

## 2022-07-05 VITALS
DIASTOLIC BLOOD PRESSURE: 80 MMHG | SYSTOLIC BLOOD PRESSURE: 140 MMHG | OXYGEN SATURATION: 98 % | BODY MASS INDEX: 28.35 KG/M2 | TEMPERATURE: 97.3 F | HEART RATE: 63 BPM | WEIGHT: 160 LBS | HEIGHT: 63 IN

## 2022-07-05 DIAGNOSIS — E78.2 MIXED HYPERLIPIDEMIA: ICD-10-CM

## 2022-07-05 DIAGNOSIS — E03.8 OTHER SPECIFIED HYPOTHYROIDISM: ICD-10-CM

## 2022-07-05 DIAGNOSIS — I10 PRIMARY HYPERTENSION: Primary | ICD-10-CM

## 2022-07-05 DIAGNOSIS — E11.9 TYPE 2 DIABETES MELLITUS WITHOUT COMPLICATION, WITHOUT LONG-TERM CURRENT USE OF INSULIN: ICD-10-CM

## 2022-07-05 DIAGNOSIS — Z72.0 TOBACCO USE: ICD-10-CM

## 2022-07-05 DIAGNOSIS — E03.9 HYPOTHYROIDISM, UNSPECIFIED TYPE: ICD-10-CM

## 2022-07-05 PROCEDURE — 99214 OFFICE O/P EST MOD 30 MIN: CPT | Performed by: INTERNAL MEDICINE

## 2022-07-05 RX ORDER — LOSARTAN POTASSIUM 50 MG/1
100 TABLET ORAL DAILY
Qty: 180 TABLET | Refills: 2 | Status: SHIPPED | OUTPATIENT
Start: 2022-07-05 | End: 2023-04-04 | Stop reason: SDUPTHER

## 2022-07-05 NOTE — PROGRESS NOTES
subjective     Chief Complaint   Patient presents with   • Hypertension     Follow up   • Results     Labs     • Med Refill     pending     History of Present Illness  Patient is 67 years old white female who is here for follow-up.  She is doing very well denies any chest pain palpitations or shortness of breath.    Patient recently had colonoscopy and 6 adenomatous polyps were removed.  She will have follow-up colonoscopy in 5 years.  He had lab work done which will be reviewed.    Hypertension is controlled with current medications.  She is taking losartan 100 mg daily, Hygroton 12.5 daily and Lopressor 25 twice daily.    Hyperlipidemia on Lipitor  diabetes mellitus type 2 hypothyroidism.    Past Surgical History:   Procedure Laterality Date   • CATARACT EXTRACTION, BILATERAL Bilateral     June and july 2020   • CHOLECYSTECTOMY     • COLONOSCOPY N/A 6/7/2022    Procedure: COLONOSCOPY FOR SCREENING;  Surgeon: Loren Leigh MD;  Location: [facility];  Service: Gastroenterology;  Laterality: N/A;   • COLONOSCOPY W/ BIOPSIES  2012   • HYSTERECTOMY     • VARICOSE VEIN SURGERY Right      Family History   Problem Relation Age of Onset   • Thyroid cancer Mother    • Aneurysm Mother    • Hypertension Mother    • Diabetes Mother    • Arthritis Mother    • Heart attack Father    • Heart disease Father    • Diabetes Brother    • Kidney failure Brother    • Hypertension Brother    • Diabetes Brother    • Hypertension Brother    • Breast cancer Maternal Aunt      Past Medical History:   Diagnosis Date   • Arthritis    • B12 deficiency    • Bronchitis, acute    • Chronic pain syndrome    • COPD (chronic obstructive pulmonary disease) (HCC)    • Diabetes mellitus (HCC)    • Disease of thyroid gland    • Elevated cholesterol    • Hyperlipidemia    • Hypertension      Patient Active Problem List   Diagnosis   • Acute bronchitis   • B12 deficiency*   • Chronic pain syndrome*   • Hypertension*   • Hyperlipidemia*   •  Diabetes mellitus*   • Hypothyroidism*   • Vitamin D deficiency*   • Right ear pain   • Sebaceous cyst   • Chronic bronchitis (HCC)   • Age-related osteoporosis without current pathological fracture   • Tobacco use   • Environmental allergies   • Edema leg   • Chronic systolic heart failure (HCC)   • Closed fracture of multiple ribs of left side   • Chest wall pain   • Encounter for screening for malignant neoplasm of colon       Social History     Tobacco Use   • Smoking status: Current Every Day Smoker     Packs/day: 0.50     Years: 10.00     Pack years: 5.00     Types: Cigarettes   • Smokeless tobacco: Never Used   Vaping Use   • Vaping Use: Never used   Substance Use Topics   • Alcohol use: No   • Drug use: No       Allergies   Allergen Reactions   • Avelox [Moxifloxacin] Hives     Burning itcing whelps allover       Current Outpatient Medications on File Prior to Visit   Medication Sig   • albuterol sulfate  (90 Base) MCG/ACT inhaler Inhale 2 puffs Every 4 (Four) Hours As Needed for Wheezing.   • atorvastatin (LIPITOR) 10 MG tablet Take 1 tablet by mouth Daily. (Patient taking differently: Take 10 mg by mouth Daily. Takes every other day due to leg cramps)   • Breo Ellipta 100-25 MCG/INH inhaler INHALE ONE PUFF BY MOUTH EVERY DAY FOR BREATHING   • chlorthalidone (HYGROTON) 25 MG tablet Take 0.5 tablets by mouth Daily.   • Diclofenac Sodium (VOLTAREN) 1 % gel gel Apply 4 g topically to the appropriate area as directed 4 (Four) Times a Day As Needed.   • glucose blood test strip 1 each by Other route Daily.   • hydroxychloroquine (PLAQUENIL) 200 MG tablet Take 200 mg by mouth daily.   • levothyroxine (SYNTHROID, LEVOTHROID) 88 MCG tablet Take 1 tablet by mouth Daily.   • linagliptin (TRADJENTA) 5 MG tablet tablet Take 1 tablet by mouth Daily.   • metFORMIN (GLUCOPHAGE) 1000 MG tablet Take 1 tablet by mouth 2 (Two) Times a Day Before Meals.   • metoprolol tartrate (LOPRESSOR) 25 MG tablet TAKE ONE TABLET BY  "MOUTH EVERY 12 HOURS FOR BLOOD PRESSURE   • montelukast (SINGULAIR) 10 MG tablet Take 1 tablet by mouth Every Night.   • ONE TOUCH LANCETS [facility] USES TWICE A DAY TO CHECK BLOOD SUGAR   • Restasis 0.05 % ophthalmic emulsion Administer 1 drop into the left eye 2 (Two) Times a Day.   • traMADol (Ultram) 50 MG tablet Take 0.5 tablets by mouth Every 8 (Eight) Hours As Needed for Moderate Pain .   • vitamin B-12 (CYANOCOBALAMIN) 1000 MCG tablet Take 1,000 mcg by mouth Daily.   • [DISCONTINUED] losartan (COZAAR) 50 MG tablet Take 2 tablets by mouth Daily. for blood pressure   • [DISCONTINUED] cetirizine (zyrTEC) 10 MG tablet Take 1 tablet by mouth Daily.     No current facility-administered medications on file prior to visit.         The following portions of the patient's history were reviewed and updated as appropriate: allergies, current medications, past family history, past medical history, past social history, past surgical history and problem list.    Review of Systems   Constitutional: Negative.   HENT: Negative.  Negative for congestion.    Eyes: Negative.    Cardiovascular: Negative.  Negative for chest pain, cyanosis, dyspnea on exertion, irregular heartbeat, leg swelling, near-syncope, orthopnea, palpitations, paroxysmal nocturnal dyspnea and syncope.   Respiratory: Negative.  Negative for shortness of breath.    Hematologic/Lymphatic: Negative.    Musculoskeletal: Negative.    Gastrointestinal: Negative.    Neurological: Negative.  Negative for headaches.          Objective:     /80   Pulse 63   Temp 97.3 °F (36.3 °C)   Ht 160 cm (63\")   Wt 72.6 kg (160 lb)   SpO2 98%   BMI 28.34 kg/m²   Pulmonary:      Effort: Pulmonary effort is normal.      Breath sounds: Normal breath sounds. No stridor. No wheezing. No rhonchi. No rales.   Cardiovascular:      PMI at left midclavicular line. Normal rate. Regular rhythm. Normal S1. Normal S2.      Murmurs: There is no murmur.      No gallop. No click. No rub. "   Pulses:     Intact distal pulses.   Edema:     Peripheral edema absent.           Lab Review  Lab Results   Component Value Date     06/17/2022    K 5.0 06/17/2022    CL 98 06/17/2022    BUN 14 06/17/2022    CREATININE 0.75 06/17/2022    GLUCOSE 113 (H) 06/17/2022    CALCIUM 9.3 06/17/2022    ALT 8 06/17/2022    ALKPHOS 66 06/17/2022    LABIL2 1.5 04/28/2016     Lab Results   Component Value Date    CKTOTAL 25 06/17/2022     Lab Results   Component Value Date    WBC 8.17 06/17/2022    HGB 13.2 06/17/2022    HCT 39.2 06/17/2022     06/17/2022     No results found for: INR  No results found for: MG  Lab Results   Component Value Date    TSH 1.830 06/17/2022     No results found for: BNP  Lab Results   Component Value Date    CHLPL 184 04/28/2016    CHOL 168 06/17/2022    TRIG 153 (H) 06/17/2022    HDL 50 06/17/2022    VLDL 27 06/17/2022    LDLHDL 1.75 06/17/2022     Lab Results   Component Value Date    LDL 91 06/17/2022     (H) 12/27/2021       Procedures       I personally viewed and interpreted the patient's LAB data         Assessment:     1. Primary hypertension    2. Tobacco use    3. Mixed hyperlipidemia    4. Hypothyroidism, unspecified type    5. Type 2 diabetes mellitus without complication, without long-term current use of insulin (HCC)          Plan:     Blood pressure is 140/80 which is upper limits of normal.  Diet restriction healthy lifestyle and salt restriction discussed with the patient  She will continue metoprolol, losartan and Hygroton.    Cessation of tobacco use stressed.  Hyperlipidemia LDL is 91 she was advised to increase Lipitor but she is taking only every other day because of myalgias.    Diabetes mellitus is well controlled.  Thyroid functions are mildly abnormal TSH is 1.83 and free T4 1.71  She will continue current dose of Synthroid.  Follow-up scheduled        No follow-ups on file.

## 2022-07-13 RX ORDER — MONTELUKAST SODIUM 10 MG/1
TABLET ORAL
Qty: 90 TABLET | Refills: 5 | Status: SHIPPED | OUTPATIENT
Start: 2022-07-13

## 2022-07-13 RX ORDER — ALBUTEROL SULFATE 90 UG/1
AEROSOL, METERED RESPIRATORY (INHALATION)
Qty: 18 G | Refills: 5 | Status: SHIPPED | OUTPATIENT
Start: 2022-07-13 | End: 2023-04-04 | Stop reason: SDUPTHER

## 2022-07-21 ENCOUNTER — OFFICE VISIT (OUTPATIENT)
Dept: CARDIOLOGY | Facility: CLINIC | Age: 67
End: 2022-07-21

## 2022-07-21 VITALS
DIASTOLIC BLOOD PRESSURE: 72 MMHG | OXYGEN SATURATION: 96 % | SYSTOLIC BLOOD PRESSURE: 134 MMHG | HEART RATE: 68 BPM | WEIGHT: 159 LBS | TEMPERATURE: 97.1 F | BODY MASS INDEX: 28.17 KG/M2 | HEIGHT: 63 IN

## 2022-07-21 DIAGNOSIS — E03.8 OTHER SPECIFIED HYPOTHYROIDISM: ICD-10-CM

## 2022-07-21 DIAGNOSIS — Z72.0 TOBACCO USE: ICD-10-CM

## 2022-07-21 DIAGNOSIS — E11.9 TYPE 2 DIABETES MELLITUS WITHOUT COMPLICATION, WITHOUT LONG-TERM CURRENT USE OF INSULIN: ICD-10-CM

## 2022-07-21 DIAGNOSIS — Z12.31 ENCOUNTER FOR SCREENING MAMMOGRAM FOR BREAST CANCER: Primary | ICD-10-CM

## 2022-07-21 DIAGNOSIS — E03.9 HYPOTHYROIDISM, UNSPECIFIED TYPE: ICD-10-CM

## 2022-07-21 DIAGNOSIS — M81.0 AGE-RELATED OSTEOPOROSIS WITHOUT CURRENT PATHOLOGICAL FRACTURE: ICD-10-CM

## 2022-07-21 DIAGNOSIS — I10 PRIMARY HYPERTENSION: ICD-10-CM

## 2022-07-21 DIAGNOSIS — E55.9 VITAMIN D DEFICIENCY: ICD-10-CM

## 2022-07-21 DIAGNOSIS — E78.2 MIXED HYPERLIPIDEMIA: ICD-10-CM

## 2022-07-21 PROCEDURE — G0439 PPPS, SUBSEQ VISIT: HCPCS | Performed by: INTERNAL MEDICINE

## 2022-07-21 RX ORDER — MELATONIN
1000 DAILY
Qty: 90 TABLET | Refills: 0 | OUTPATIENT
Start: 2022-07-21 | End: 2023-04-04 | Stop reason: SDUPTHER

## 2022-07-21 RX ORDER — LEVOTHYROXINE SODIUM 88 UG/1
88 TABLET ORAL DAILY
Qty: 90 TABLET | Refills: 3 | Status: SHIPPED | OUTPATIENT
Start: 2022-07-21 | End: 2022-10-04 | Stop reason: SDUPTHER

## 2022-07-21 NOTE — PROGRESS NOTES
Initial Medicare Wellness Visit   The ABC's of the Annual Wellness Visit    Chief Complaint   Patient presents with   • Medicare Wellness-Initial Visit       HPI:  ELA Nina-1955, is a 67 y.o. female who presents for an Initial Medicare Wellness Visit.    Recent Hospitalizations:  No hospitalization(s) within the last year..    Current Medical Providers:  Patient Care Team:  Evita Rosario MD as PCP - General  Evita Rosario MD as PCP - Family Medicine    Health Habits and Functional and Cognitive Screening and Depression Screening:  Functional & Cognitive Status 2022   Do you have difficulty preparing food and eating? No   Do you have difficulty bathing yourself, getting dressed or grooming yourself? No   Do you have difficulty using the toilet? No   Do you have difficulty moving around from place to place? No   Do you have trouble with steps or getting out of a bed or a chair? Yes   Current Diet Limited Junk Food   Dental Exam Up to date   Eye Exam Up to date   Exercise (times per week) 1 times per week   Do you need help using the phone?  No   Are you deaf or do you have serious difficulty hearing?  No   Do you need help with transportation? No   Do you need help shopping? No   Do you need help preparing meals?  No   Do you need help with housework?  No   Do you need help with laundry? No   Do you need help taking your medications? No   Do you need help managing money? No   Do you ever drive or ride in a car without wearing a seat belt? No   Have you felt unusual stress, anger or loneliness in the last month? No   Who do you live with? Spouse   If you need help, do you have trouble finding someone available to you? No   Do you have difficulty concentrating, remembering or making decisions? No       Compared to one year ago, the patient feels her physical health is the same and her mental health is the same.    Depression Screen:  PHQ-2/PHQ-9 Depression Screening 2022  "  Retired PHQ-9 Total Score -   Retired Total Score -   Little Interest or Pleasure in Doing Things 0-->not at all   Feeling Down, Depressed or Hopeless 0-->not at all   PHQ-9: Brief Depression Severity Measure Score 0         Past Medical/Family/Social History:  The following portions of the patient's history were reviewed and updated as appropriate: past medical history, past social history and past surgical history.      Aspirin use counseling: Does not need ASA (and currently is not on it)    Current medication list contains no high risk medications.  No harmful drug interactions have been identified.     Social History     Tobacco Use   • Smoking status: Current Every Day Smoker     Packs/day: 0.50     Years: 10.00     Pack years: 5.00     Types: Cigarettes   • Smokeless tobacco: Never Used   Substance Use Topics   • Alcohol use: No       Review of Systems   Constitutional: Negative.    HENT: Negative.    Eyes: Negative.    Respiratory: Negative.    Cardiovascular: Negative.    Gastrointestinal: Negative.    Endocrine: Negative.    Genitourinary: Negative.    Musculoskeletal: Negative.    Skin: Negative.    Allergic/Immunologic: Negative.    Neurological: Negative.    Hematological: Negative.    Psychiatric/Behavioral: Negative.        Objective     Vitals:    07/21/22 1003   BP: 134/72   BP Location: Left arm   Patient Position: Sitting   Cuff Size: Adult   Pulse: 68   Temp: 97.1 °F (36.2 °C)   SpO2: 96%   Weight: 72.1 kg (159 lb)   Height: 160 cm (63\")   PainSc:   4   PainLoc: Knee  Comment: right       BMI is >= 25 and <30. (Overweight) The following options were offered after discussion;: weight loss educational material (shared in after visit summary)      No exam data present    The patient has no evidence of cognitve impairment.     Physical Exam  Constitutional:       Appearance: Normal appearance. She is normal weight.   HENT:      Head: Normocephalic and atraumatic.      Right Ear: Tympanic membrane " and ear canal normal.      Left Ear: Tympanic membrane and ear canal normal.      Nose: Nose normal.      Mouth/Throat:      Mouth: Mucous membranes are moist.      Pharynx: Oropharynx is clear.   Eyes:      Extraocular Movements: Extraocular movements intact.      Pupils: Pupils are equal, round, and reactive to light.   Neck:      Vascular: No carotid bruit.   Cardiovascular:      Rate and Rhythm: Normal rate and regular rhythm.      Pulses: Normal pulses.      Heart sounds: Normal heart sounds. No murmur heard.    No friction rub.   Pulmonary:      Effort: Pulmonary effort is normal.      Breath sounds: Normal breath sounds.   Abdominal:      General: Abdomen is flat. Bowel sounds are normal. There is no distension.      Palpations: Abdomen is soft.      Tenderness: There is no abdominal tenderness.   Musculoskeletal:         General: Normal range of motion.      Cervical back: Normal range of motion and neck supple. No rigidity or tenderness.   Skin:     General: Skin is warm and dry.      Capillary Refill: Capillary refill takes less than 2 seconds.   Neurological:      General: No focal deficit present.      Mental Status: She is alert and oriented to person, place, and time.   Psychiatric:         Mood and Affect: Mood normal.         Behavior: Behavior normal.         Thought Content: Thought content normal.         Judgment: Judgment normal.         Recent Lab Results:     Lab Results   Component Value Date    CHOL 168 06/17/2022    TRIG 153 (H) 06/17/2022    HDL 50 06/17/2022    VLDL 27 06/17/2022    LDLHDL 1.75 06/17/2022     Units 1 mo ago   (6/17/22) 6 mo ago   (12/27/21) 1 yr ago   (12/4/20) 2 yr ago   (6/15/20) 2 yr ago   (11/21/19) 3 yr ago   (6/10/19) 3 yr ago   (12/6/18)   Free T4   0.93 - 1.70 ng/dL 1.71 High   1.69  1.56  1.53  1.89 High   1.93 High   1.63 R      0 Result Notes    Component   Ref Range & Units 1 mo ago   (6/17/22) 1 mo ago   (6/7/22) 5 mo ago   (1/28/22) 6 mo ago   (12/27/21) 11  mo ago   (8/9/21) 1 yr ago   (1/29/21) 1 yr ago   (12/4/20)   Glucose   65 - 99 mg/dL 113 High   120 R, CM  107 High   122 High   113 High   135 High   128 High     BUN   8 - 23 mg/dL 14   14  10  9  15  14    Creatinine   0.57 - 1.00 mg/dL 0.75   0.80  0.83  0.83  0.82  0.74    Sodium   136 - 145 mmol/L 137   139  136  139  139  137    Potassium   3.5 - 5.2 mmol/L 5.0   4.9  4.6  4.8  5.0  4.8    Chloride   98 - 107 mmol/L 98   105  102  102  99  100    CO2   22.0 - 29.0 mmol/L 26.7   24.7  25.4  24.9  27.8  29.4 High     Calcium   8.6 - 10.5 mg/dL 9.3   9.3  9.0  9.1  9.8  9.4    Total Protein   6.0 - 8.5 g/dL 6.8   7.3  6.7  6.9  7.2  7.0    Albumin   3.50 - 5.20 g/dL 4.30   4.10  4.00  4.00  4.00  4.23    ALT (SGPT)   1 - 33 U/L 8   7  7  12  10  7    AST (SGOT)   1 - 32 U/L 12   8  9  12  10  9    Alkaline Phosphatase   39 - 117 U/L 66   77  58  60  63  66    Total Bilirubin   0.0 - 1.2 mg/dL 0.3   0.3  0.3  0.2  0.2  0.4    Globulin   gm/dL 2.5   3.2  2.7  2.9  3.2  2.8    A/G Ratio   g/dL 1.7               Component   Ref Range & Units 1 mo ago   (6/17/22) 11 mo ago   (8/9/21) 4 yr ago   (2/21/18) 5 yr ago   (4/12/17) 5 yr ago   (10/18/16)   25 Hydroxy, Vitamin D   30.0 - 100.0 ng/ml 27.4 Low   24.8 R  26.0 R  34.0 R  52.0 R          Assessment & Plan   Age-appropriate Screening Schedule:  Refer to the list below for future screening recommendations based on patient's age, sex and/or medical conditions.      Health Maintenance   Topic Date Due   • MAMMOGRAM  07/15/2022   • DIABETIC FOOT EXAM  12/05/2022 (Originally 4/6/2017)   • DXA SCAN  12/05/2022 (Originally 7/16/2021)   • INFLUENZA VACCINE  10/01/2022   • HEMOGLOBIN A1C  12/17/2022   • DIABETIC EYE EXAM  12/20/2022   • LIPID PANEL  06/17/2023   • URINE MICROALBUMIN  06/17/2023   • PAP SMEAR  Discontinued   • TDAP/TD VACCINES  Discontinued   • ZOSTER VACCINE  Discontinued         Medicare Risks and Personalized Health Plan:  CMS-Preventive Services Quick  Reference  Advance Directive Discussion  Obesity/Overweight   Tobacco Use/Dependance (use dotphrase .tobaccocessation for documentation)    Advance Care Planning:  ACP discussion was held with the patient during this visit. Patient does not have an advance directive, information provided.    Diagnoses and all orders for this visit:    1. Encounter for screening mammogram for breast cancer (Primary)  -     Mammo Screening Digital Tomosynthesis Bilateral With CAD; Future    2. Primary hypertension    3. Mixed hyperlipidemia    4. Vitamin D deficiency*    5. Tobacco use    6. Hypothyroidism, unspecified type    7. Type 2 diabetes mellitus without complication, without long-term current use of insulin (HCC)    8. Age-related osteoporosis without current pathological fracture        An After Visit Summary and PPPS with all of these plans were given to the patient.    Leonarda Diaz  reports that she has been smoking cigarettes. She has a 5.00 pack-year smoking history. She has never used smokeless tobacco.. I have educated her on the risk of diseases from using tobacco products such as cancer, COPD and heart disease.   Subjective   Chief Complaint   Patient presents with   • Medicare Wellness-Initial Visit       History of Present Illness      Past Surgical History:   Procedure Laterality Date   • CATARACT EXTRACTION, BILATERAL Bilateral     June and july 2020   • CHOLECYSTECTOMY     • COLONOSCOPY N/A 6/7/2022    Procedure: COLONOSCOPY FOR SCREENING;  Surgeon: Loren Leigh MD;  Location: [facility];  Service: Gastroenterology;  Laterality: N/A;   • COLONOSCOPY W/ BIOPSIES  2012   • HYSTERECTOMY     • VARICOSE VEIN SURGERY Right      Family History   Problem Relation Age of Onset   • Thyroid cancer Mother    • Aneurysm Mother    • Hypertension Mother    • Diabetes Mother    • Arthritis Mother    • Heart attack Father    • Heart disease Father    • Diabetes Brother    • Kidney failure Brother    • Hypertension  Brother    • Diabetes Brother    • Hypertension Brother    • Breast cancer Maternal Aunt      Past Medical History:   Diagnosis Date   • Arthritis    • B12 deficiency    • Bronchitis, acute    • Chronic pain syndrome    • COPD (chronic obstructive pulmonary disease) (HCC)    • Diabetes mellitus (HCC)    • Disease of thyroid gland    • Elevated cholesterol    • Hyperlipidemia    • Hypertension        Patient Active Problem List   Diagnosis   • Acute bronchitis   • B12 deficiency*   • Chronic pain syndrome*   • Hypertension*   • Hyperlipidemia*   • Diabetes mellitus*   • Hypothyroidism*   • Vitamin D deficiency*   • Right ear pain   • Sebaceous cyst   • Chronic bronchitis (HCC)   • Age-related osteoporosis without current pathological fracture   • Tobacco use   • Environmental allergies   • Edema leg   • Chronic systolic heart failure (HCC)   • Closed fracture of multiple ribs of left side   • Chest wall pain   • Encounter for screening for malignant neoplasm of colon         Social History     Tobacco Use   • Smoking status: Current Every Day Smoker     Packs/day: 0.50     Years: 10.00     Pack years: 5.00     Types: Cigarettes   • Smokeless tobacco: Never Used   Vaping Use   • Vaping Use: Never used   Substance Use Topics   • Alcohol use: No   • Drug use: No         The following portions of the patient's history were reviewed and updated as appropriate: allergies, current medications, past family history, past medical history, past social history, past surgical history and problem list.    Allergies   Allergen Reactions   • Avelox [Moxifloxacin] Hives     Burning itcing whelps allover         Current Outpatient Medications:   •  albuterol sulfate  (90 Base) MCG/ACT inhaler, INHALE TWO PUFFS BY MOUTH EVERY 4 HOURS AS NEEDED FOR WHEEZING, Disp: 18 g, Rfl: 5  •  atorvastatin (LIPITOR) 10 MG tablet, Take 1 tablet by mouth Daily. (Patient taking differently: Take 10 mg by mouth Daily. Takes every other day due to  leg cramps), Disp: 90 tablet, Rfl: 3  •  Breo Ellipta 100-25 MCG/INH inhaler, INHALE ONE PUFF BY MOUTH EVERY DAY FOR BREATHING, Disp: 60 each, Rfl: 12  •  chlorthalidone (HYGROTON) 25 MG tablet, Take 0.5 tablets by mouth Daily., Disp: 45 tablet, Rfl: 1  •  Diclofenac Sodium (VOLTAREN) 1 % gel gel, Apply 4 g topically to the appropriate area as directed 4 (Four) Times a Day As Needed., Disp: , Rfl:   •  glucose blood test strip, 1 each by Other route Daily., Disp: 100 each, Rfl: 2  •  hydroxychloroquine (PLAQUENIL) 200 MG tablet, Take 200 mg by mouth daily., Disp: , Rfl:   •  levothyroxine (SYNTHROID, LEVOTHROID) 88 MCG tablet, Take 1 tablet by mouth Daily., Disp: 90 tablet, Rfl: 3  •  linagliptin (TRADJENTA) 5 MG tablet tablet, Take 1 tablet by mouth Daily., Disp: 90 tablet, Rfl: 1  •  losartan (COZAAR) 50 MG tablet, Take 2 tablets by mouth Daily. for blood pressure, Disp: 180 tablet, Rfl: 2  •  metFORMIN (GLUCOPHAGE) 1000 MG tablet, Take 1 tablet by mouth 2 (Two) Times a Day With Meals., Disp: 180 tablet, Rfl: 3  •  metoprolol tartrate (LOPRESSOR) 25 MG tablet, TAKE ONE TABLET BY MOUTH EVERY 12 HOURS FOR BLOOD PRESSURE, Disp: 180 tablet, Rfl: 3  •  montelukast (SINGULAIR) 10 MG tablet, TAKE ONE TABLET BY MOUTH EVERY NIGHT AT BEDTIME, Disp: 90 tablet, Rfl: 5  •  ONE TOUCH LANCETS misc, USES TWICE A DAY TO CHECK BLOOD SUGAR, Disp: 180 each, Rfl: 1  •  Restasis 0.05 % ophthalmic emulsion, Administer 1 drop into the left eye 2 (Two) Times a Day., Disp: , Rfl:   •  traMADol (Ultram) 50 MG tablet, Take 0.5 tablets by mouth Every 8 (Eight) Hours As Needed for Moderate Pain ., Disp: 60 tablet, Rfl: 0  •  vitamin B-12 (CYANOCOBALAMIN) 1000 MCG tablet, Take 1,000 mcg by mouth Daily., Disp: , Rfl:   •  cholecalciferol (Vitamin D, Cholecalciferol,) 25 MCG (1000 UT) tablet, Take 1 tablet by mouth Daily., Disp: 90 tablet, Rfl: 0    ROS     Objective      /72 (BP Location: Left arm, Patient Position: Sitting, Cuff Size:  "Adult)   Pulse 68   Temp 97.1 °F (36.2 °C)   Ht 160 cm (63\")   Wt 72.1 kg (159 lb)   SpO2 96%   BMI 28.17 kg/m²     Physical Exam    Lab Review:    Lab Results   Component Value Date     06/17/2022    K 5.0 06/17/2022    CL 98 06/17/2022    BUN 14 06/17/2022    CREATININE 0.75 06/17/2022    GLUCOSE 113 (H) 06/17/2022    CALCIUM 9.3 06/17/2022    ALT 8 06/17/2022    ALKPHOS 66 06/17/2022    LABIL2 1.5 04/28/2016     Lab Results   Component Value Date    CKTOTAL 25 06/17/2022     Lab Results   Component Value Date    WBC 8.17 06/17/2022    HGB 13.2 06/17/2022    HCT 39.2 06/17/2022     06/17/2022     No results found for: INR  No results found for: MG  Lab Results   Component Value Date    CHOL 168 06/17/2022    CHLPL 184 04/28/2016    TRIG 153 (H) 06/17/2022    HDL 50 06/17/2022    VLDL 27 06/17/2022    LDLHDL 1.75 06/17/2022     No results found for: BNP    Procedures    I reviewed the patient's new clinical results.  I personally viewed and interpreted the patient's EKG/lab data        Assessment:   Diagnosis Plan   1. Encounter for screening mammogram for breast cancer  Mammo Screening Digital Tomosynthesis Bilateral With CAD   2. Primary hypertension     3. Mixed hyperlipidemia     4. Vitamin D deficiency*     5. Tobacco use     6. Hypothyroidism, unspecified type     7. Type 2 diabetes mellitus without complication, without long-term current use of insulin (HCC)     8. Age-related osteoporosis without current pathological fracture     9. Other specified hypothyroidism  metFORMIN (GLUCOPHAGE) 1000 MG tablet          Plan:  All the problems were discussed in detail with the patient  Weight loss was emphasized  Cessation of tobacco use was stressed.  Blood pressure is very well controlled  Lipid control is also good.  Advanced directive discussed.  Mammogram scheduled  All health maintenance issues were discussed.      I advised her to quit and she is willing to quit. We have discussed the " following method/s for tobacco cessation:  Education Material.  Together we have set a quit date for 1 month from today.  She will follow up with me in several months or sooner to check on her progress.    I spent 3  minutes counseling the patient.  Smoking Tobacco Information, Adult  Smoking tobacco can be harmful to your health. Tobacco contains a poisonous (toxic), colorless chemical called nicotine. Nicotine is addictive. It changes the brain and can make it hard to stop smoking. Tobacco also has other toxic chemicals that can hurt your body and raise your risk of many cancers.  How can smoking tobacco affect me?  Smoking tobacco puts you at risk for:  · Cancer. Smoking is most commonly associated with lung cancer, but can also lead to cancer in other parts of the body.  · Chronic obstructive pulmonary disease (COPD). This is a long-term lung condition that makes it hard to breathe. It also gets worse over time.  · High blood pressure (hypertension), heart disease, stroke, or heart attack.  · Lung infections, such as pneumonia.  · Cataracts. This is when the lenses in the eyes become clouded.  · Digestive problems. This may include peptic ulcers, heartburn, and gastroesophageal reflux disease (GERD).  · Oral health problems, such as gum disease and tooth loss.  · Loss of taste and smell.  Smoking can affect your appearance by causing:  · Wrinkles.  · Yellow or stained teeth, fingers, and fingernails.  Smoking tobacco can also affect your social life, because:  1. It may be challenging to find places to smoke when away from home. Many workplaces, restaurants, hotels, and public places are tobacco-free.  2. Smoking is expensive. This is due to the cost of tobacco and the long-term costs of treating health problems from smoking.  3. Secondhand smoke may affect those around you. Secondhand smoke can cause lung cancer, breathing problems, and heart disease. Children of smokers have a higher risk for:  ? Sudden infant  death syndrome (SIDS).  ? Ear infections.  ? Lung infections.  If you currently smoke tobacco, quitting now can help you:  · Lead a longer and healthier life.  · Look, smell, breathe, and feel better over time.  · Save money.  · Protect others from the harms of secondhand smoke.  What actions can I take to prevent health problems?  Quit smoking    · Do not start smoking. Quit if you already do.  · Make a plan to quit smoking and commit to it. Look for programs to help you and ask your health care provider for recommendations and ideas.  · Set a date and write down all the reasons you want to quit.  · Let your friends and family know you are quitting so they can help and support you. Consider finding friends who also want to quit. It can be easier to quit with someone else, so that you can support each other.  · Talk with your health care provider about using nicotine replacement medicines to help you quit, such as gum, lozenges, patches, sprays, or pills.  · Do not replace cigarette smoking with electronic cigarettes, which are commonly called e-cigarettes. The safety of e-cigarettes is not known, and some may contain harmful chemicals.  · If you try to quit but return to smoking, stay positive. It is common to slip up when you first quit, so take it one day at a time.  · Be prepared for cravings. When you feel the urge to smoke, chew gum or suck on hard candy.    Lifestyle  · Stay busy and take care of your body.  · Drink enough fluid to keep your urine pale yellow.  · Get plenty of exercise and eat a healthy diet. This can help prevent weight gain after quitting.  · Monitor your eating habits. Quitting smoking can cause you to have a larger appetite than when you smoke.  · Find ways to relax. Go out with friends or family to a movie or a restaurant where people do not smoke.  · Ask your health care provider about having regular tests (screenings) to check for cancer. This may include blood tests, imaging tests,  and other tests.  · Find ways to manage your stress, such as meditation, yoga, or exercise.  Where to find support  To get support to quit smoking, consider:  · Asking your health care provider for more information and resources.  · Taking classes to learn more about quitting smoking.  · Looking for local organizations that offer resources about quitting smoking.  · Joining a support group for people who want to quit smoking in your local community.  · Calling the [employer] counselor helpline: 1-800-Quit-Now (1-482.470.1991)  Where to find more information  You may find more information about quitting smoking from:  · HelpGuide.org: www.helpguide.org  · Smokefree.gov: smokefree.gov  · American Lung Association: www.lung.org  Contact a health care provider if you:  · Have problems breathing.  · Notice that your lips, nose, or fingers turn blue.  · Have chest pain.  · Are coughing up blood.  · Feel faint or you pass out.  · Have other health changes that cause you to worry.  Summary  · Smoking tobacco can negatively affect your health, the health of those around you, your finances, and your social life.  · Do not start smoking. Quit if you already do. If you need help quitting, ask your health care provider.  · Think about joining a support group for people who want to quit smoking in your local community. There are many effective programs that will help you to quit this behavior.  This information is not intended to replace advice given to you by your health care provider. Make sure you discuss any questions you have with your health care provider.  Document Revised: 09/11/2020 Document Reviewed: 01/02/2018  [employer] Patient Education © 2021 [employer]      LANCE Rosario M.D. FACP [facility]     No follow-ups on file.

## 2022-07-21 NOTE — PATIENT INSTRUCTIONS
Smoking Tobacco Information, Adult  Smoking tobacco can be harmful to your health. Tobacco contains a poisonous (toxic), colorless chemical called nicotine. Nicotine is addictive. It changes the brain and can make it hard to stop smoking. Tobacco also has other toxic chemicals that can hurt your body and raise your risk of many cancers.  How can smoking tobacco affect me?  Smoking tobacco puts you at risk for:  Cancer. Smoking is most commonly associated with lung cancer, but can also lead to cancer in other parts of the body.  Chronic obstructive pulmonary disease (COPD). This is a long-term lung condition that makes it hard to breathe. It also gets worse over time.  High blood pressure (hypertension), heart disease, stroke, or heart attack.  Lung infections, such as pneumonia.  Cataracts. This is when the lenses in the eyes become clouded.  Digestive problems. This may include peptic ulcers, heartburn, and gastroesophageal reflux disease (GERD).  Oral health problems, such as gum disease and tooth loss.  Loss of taste and smell.  Smoking can affect your appearance by causing:  Wrinkles.  Yellow or stained teeth, fingers, and fingernails.  Smoking tobacco can also affect your social life, because:  It may be challenging to find places to smoke when away from home. Many workplaces, restaurants, hotels, and public places are tobacco-free.  Smoking is expensive. This is due to the cost of tobacco and the long-term costs of treating health problems from smoking.  Secondhand smoke may affect those around you. Secondhand smoke can cause lung cancer, breathing problems, and heart disease. Children of smokers have a higher risk for:  Sudden infant death syndrome (SIDS).  Ear infections.  Lung infections.  If you currently smoke tobacco, quitting now can help you:  Lead a longer and healthier life.  Look, smell, breathe, and feel better over time.  Save money.  Protect others from the harms of secondhand smoke.  What  actions can I take to prevent health problems?  Quit smoking    Do not start smoking. Quit if you already do.  Make a plan to quit smoking and commit to it. Look for programs to help you and ask your health care provider for recommendations and ideas.  Set a date and write down all the reasons you want to quit.  Let your friends and family know you are quitting so they can help and support you. Consider finding friends who also want to quit. It can be easier to quit with someone else, so that you can support each other.  Talk with your health care provider about using nicotine replacement medicines to help you quit, such as gum, lozenges, patches, sprays, or pills.  Do not replace cigarette smoking with electronic cigarettes, which are commonly called e-cigarettes. The safety of e-cigarettes is not known, and some may contain harmful chemicals.  If you try to quit but return to smoking, stay positive. It is common to slip up when you first quit, so take it one day at a time.  Be prepared for cravings. When you feel the urge to smoke, chew gum or suck on hard candy.    Lifestyle  Stay busy and take care of your body.  Drink enough fluid to keep your urine pale yellow.  Get plenty of exercise and eat a healthy diet. This can help prevent weight gain after quitting.  Monitor your eating habits. Quitting smoking can cause you to have a larger appetite than when you smoke.  Find ways to relax. Go out with friends or family to a movie or a restaurant where people do not smoke.  Ask your health care provider about having regular tests (screenings) to check for cancer. This may include blood tests, imaging tests, and other tests.  Find ways to manage your stress, such as meditation, yoga, or exercise.  Where to find support  To get support to quit smoking, consider:  Asking your health care provider for more information and resources.  Taking classes to learn more about quitting smoking.  Looking for local organizations  that offer resources about quitting smoking.  Joining a support group for people who want to quit smoking in your local community.  Calling the smokefree.gov counselor helpline: 1-800-Quit-Now (1-174.411.6348)  Where to find more information  You may find more information about quitting smoking from:  HelpGuide.org: www.helpguide.org  Smokefree.gov: smokefree.gov  American Lung Association: www.lung.org  Contact a health care provider if you:  Have problems breathing.  Notice that your lips, nose, or fingers turn blue.  Have chest pain.  Are coughing up blood.  Feel faint or you pass out.  Have other health changes that cause you to worry.  Summary  Smoking tobacco can negatively affect your health, the health of those around you, your finances, and your social life.  Do not start smoking. Quit if you already do. If you need help quitting, ask your health care provider.  Think about joining a support group for people who want to quit smoking in your local community. There are many effective programs that will help you to quit this behavior.  This information is not intended to replace advice given to you by your health care provider. Make sure you discuss any questions you have with your health care provider.  Document Revised: 09/11/2020 Document Reviewed: 01/02/2018  Elsevier Patient Education © 2021 [employer]

## 2022-08-04 ENCOUNTER — HOSPITAL ENCOUNTER (OUTPATIENT)
Dept: MAMMOGRAPHY | Facility: HOSPITAL | Age: 67
Discharge: HOME | End: 2022-08-04
Admitting: INTERNAL MEDICINE

## 2022-08-04 DIAGNOSIS — Z12.31 ENCOUNTER FOR SCREENING MAMMOGRAM FOR BREAST CANCER: ICD-10-CM

## 2022-08-04 PROCEDURE — 77063 BREAST TOMOSYNTHESIS BI: CPT | Performed by: RADIOLOGY

## 2022-08-04 PROCEDURE — 77067 SCR MAMMO BI INCL CAD: CPT | Performed by: RADIOLOGY

## 2022-08-04 PROCEDURE — 77063 BREAST TOMOSYNTHESIS BI: CPT

## 2022-08-04 PROCEDURE — 77067 SCR MAMMO BI INCL CAD: CPT

## 2022-08-08 ENCOUNTER — TELEPHONE (OUTPATIENT)
Dept: CARDIOLOGY | Facility: CLINIC | Age: 67
End: 2022-08-08

## 2022-09-07 ENCOUNTER — TELEPHONE (OUTPATIENT)
Dept: CARDIOLOGY | Facility: CLINIC | Age: 67
End: 2022-09-07

## 2022-09-07 RX ORDER — ONDANSETRON 4 MG/1
4 TABLET, FILM COATED ORAL EVERY 8 HOURS PRN
Qty: 14 TABLET | Refills: 0 | Status: SHIPPED | OUTPATIENT
Start: 2022-09-07 | End: 2023-04-04 | Stop reason: SDUPTHER

## 2022-09-07 NOTE — TELEPHONE ENCOUNTER
Call in Zofran 4 mg every 6 hours as needed for nausea  She needs to go to the ER for possible COVID.

## 2022-09-07 NOTE — TELEPHONE ENCOUNTER
Patient c/o cough, congestion, did have a low grade fever but it is gone, now she has nausea, upset stomach, no appetite  Ears pop and crack, real weak, feels bad all over... has not been tested for covid, [facility]  Allergic to avelox.

## 2022-09-08 ENCOUNTER — TELEPHONE (OUTPATIENT)
Dept: CARDIOLOGY | Facility: CLINIC | Age: 67
End: 2022-09-08

## 2022-09-08 NOTE — TELEPHONE ENCOUNTER
Patient called to let us know she had a positive covid test and wants to know if anything can be called into the pharmacy for her to take.

## 2022-09-08 NOTE — TELEPHONE ENCOUNTER
We do not recommend anything for just positive COVID test.  There is no really good treatment so depending on symptoms we recommend antibody infusions.  There is a pill also available but it has a lot of reaction with the other pills.  If symptoms are bad I would recommend to go to ER

## 2022-09-09 ENCOUNTER — TELEPHONE (OUTPATIENT)
Dept: CARDIOLOGY | Facility: CLINIC | Age: 67
End: 2022-09-09

## 2022-09-09 RX ORDER — CETIRIZINE HYDROCHLORIDE 5 MG/1
5 TABLET ORAL DAILY
Qty: 30 TABLET | Refills: 1 | Status: SHIPPED | OUTPATIENT
Start: 2022-09-09 | End: 2023-04-04

## 2022-09-09 NOTE — TELEPHONE ENCOUNTER
Patient c/o a lot of head and chest congestion, she tested positve for covid and wants to know if we can sent in something for the congestion to help loosen it up, please send to [facility]..  Allergic to avelox

## 2022-10-04 ENCOUNTER — HOSPITAL ENCOUNTER (OUTPATIENT)
Dept: GENERAL RADIOLOGY | Facility: HOSPITAL | Age: 67
Discharge: HOME | End: 2022-10-04

## 2022-10-04 ENCOUNTER — OFFICE VISIT (OUTPATIENT)
Dept: CARDIOLOGY | Facility: CLINIC | Age: 67
End: 2022-10-04

## 2022-10-04 ENCOUNTER — TELEPHONE (OUTPATIENT)
Dept: CARDIOLOGY | Facility: CLINIC | Age: 67
End: 2022-10-04

## 2022-10-04 VITALS
DIASTOLIC BLOOD PRESSURE: 84 MMHG | BODY MASS INDEX: 28.71 KG/M2 | SYSTOLIC BLOOD PRESSURE: 120 MMHG | HEIGHT: 62 IN | OXYGEN SATURATION: 96 % | WEIGHT: 156 LBS | HEART RATE: 65 BPM

## 2022-10-04 DIAGNOSIS — M79.672 LEFT FOOT PAIN: ICD-10-CM

## 2022-10-04 DIAGNOSIS — M79.672 LEFT FOOT PAIN: Primary | ICD-10-CM

## 2022-10-04 DIAGNOSIS — S92.902A FRACTURE, FOOT, LEFT, CLOSED, INITIAL ENCOUNTER: Primary | ICD-10-CM

## 2022-10-04 DIAGNOSIS — E03.8 OTHER SPECIFIED HYPOTHYROIDISM: ICD-10-CM

## 2022-10-04 DIAGNOSIS — E78.2 MIXED HYPERLIPIDEMIA: ICD-10-CM

## 2022-10-04 DIAGNOSIS — I10 HYPERTENSION, UNSPECIFIED TYPE: ICD-10-CM

## 2022-10-04 DIAGNOSIS — I10 ESSENTIAL HYPERTENSION: ICD-10-CM

## 2022-10-04 DIAGNOSIS — Z23 NEED FOR PROPHYLACTIC VACCINATION AND INOCULATION AGAINST INFLUENZA: ICD-10-CM

## 2022-10-04 DIAGNOSIS — J43.8 OTHER EMPHYSEMA: ICD-10-CM

## 2022-10-04 DIAGNOSIS — E11.9 TYPE 2 DIABETES MELLITUS WITHOUT COMPLICATION, WITHOUT LONG-TERM CURRENT USE OF INSULIN: ICD-10-CM

## 2022-10-04 DIAGNOSIS — H91.91 HEARING LOSS OF RIGHT EAR, UNSPECIFIED HEARING LOSS TYPE: ICD-10-CM

## 2022-10-04 PROCEDURE — 73620 X-RAY EXAM OF FOOT: CPT | Performed by: RADIOLOGY

## 2022-10-04 PROCEDURE — 73610 X-RAY EXAM OF ANKLE: CPT

## 2022-10-04 PROCEDURE — 73610 X-RAY EXAM OF ANKLE: CPT | Performed by: RADIOLOGY

## 2022-10-04 PROCEDURE — 73620 X-RAY EXAM OF FOOT: CPT

## 2022-10-04 PROCEDURE — 99214 OFFICE O/P EST MOD 30 MIN: CPT | Performed by: INTERNAL MEDICINE

## 2022-10-04 PROCEDURE — G0008 ADMIN INFLUENZA VIRUS VAC: HCPCS | Performed by: INTERNAL MEDICINE

## 2022-10-04 PROCEDURE — 90662 IIV NO PRSV INCREASED AG IM: CPT | Performed by: INTERNAL MEDICINE

## 2022-10-04 RX ORDER — CHLORTHALIDONE 25 MG/1
12.5 TABLET ORAL DAILY
Qty: 45 TABLET | Refills: 1 | Status: SHIPPED | OUTPATIENT
Start: 2022-10-04 | End: 2023-04-04 | Stop reason: SDUPTHER

## 2022-10-04 RX ORDER — LEVOTHYROXINE SODIUM 88 UG/1
88 TABLET ORAL DAILY
Qty: 90 TABLET | Refills: 3 | Status: SHIPPED | OUTPATIENT
Start: 2022-10-04 | End: 2022-12-08 | Stop reason: DRUGHIGH

## 2022-10-04 NOTE — PROGRESS NOTES
subjective     Chief Complaint   Patient presents with   • Earache     Sometimes throbs also loss of hearing in right ear   • Hypertension     Follow up   • Foot Injury     Pt fell last sun and injured left foot   • Med Refill     pending     History of Present Illness     Patient is 67 years old white female who states that she stepped in a pothole while walking in the yard and twisted her ankle.  She is having pain in her foot but not in the ankle.  She can move her toes and ankle very well.  Foot is swollen.  He is worried about fracture.    She also complains of earache and loss of hearing in the right ear she has been taking Zyrtec and Singulair which is not helping    Blood pressure is very well controlled with medications.    Diabetes is well controlled with metformin and Tradjenta    Hyperlipidemia on Lipitor which she is tolerating very well.    Hypothyroidism on Synthroid replacement therapy.    Hypertension is controlled with the [place], losartan and metoprolol    Past Surgical History:   Procedure Laterality Date   • CATARACT EXTRACTION, BILATERAL Bilateral     June and july 2020   • CHOLECYSTECTOMY     • COLONOSCOPY N/A 6/7/2022    Procedure: COLONOSCOPY FOR SCREENING;  Surgeon: Loren Leigh MD;  Location: [facility];  Service: Gastroenterology;  Laterality: N/A;   • COLONOSCOPY W/ BIOPSIES  2012   • HYSTERECTOMY     • VARICOSE VEIN SURGERY Right      Family History   Problem Relation Age of Onset   • Thyroid cancer Mother    • Aneurysm Mother    • Hypertension Mother    • Diabetes Mother    • Arthritis Mother    • Heart attack Father    • Heart disease Father    • Diabetes Brother    • Kidney failure Brother    • Hypertension Brother    • Diabetes Brother    • Hypertension Brother    • Breast cancer Maternal Aunt      Past Medical History:   Diagnosis Date   • Arthritis    • B12 deficiency    • Bronchitis, acute    • Chronic pain syndrome    • COPD (chronic obstructive pulmonary disease)  (HCC)    • Diabetes mellitus (HCC)    • Disease of thyroid gland    • Elevated cholesterol    • Hyperlipidemia    • Hypertension      Patient Active Problem List   Diagnosis   • Acute bronchitis   • B12 deficiency*   • Chronic pain syndrome*   • Hypertension*   • Hyperlipidemia*   • Diabetes mellitus*   • Hypothyroidism*   • Vitamin D deficiency*   • Right ear pain   • Sebaceous cyst   • Chronic bronchitis (HCC)   • Age-related osteoporosis without current pathological fracture   • Tobacco use   • Environmental allergies   • Edema leg   • Chronic systolic heart failure (HCC)   • Closed fracture of multiple ribs of left side   • Chest wall pain   • Encounter for screening for malignant neoplasm of colon   • Left foot pain   • Hearing loss of right ear       Social History     Tobacco Use   • Smoking status: Current Every Day Smoker     Packs/day: 0.50     Years: 10.00     Pack years: 5.00     Types: Cigarettes   • Smokeless tobacco: Never Used   Vaping Use   • Vaping Use: Never used   Substance Use Topics   • Alcohol use: No   • Drug use: No       Allergies   Allergen Reactions   • Avelox [Moxifloxacin] Hives     Burning itcing whelps allover       Current Outpatient Medications on File Prior to Visit   Medication Sig   • albuterol sulfate  (90 Base) MCG/ACT inhaler INHALE TWO PUFFS BY MOUTH EVERY 4 HOURS AS NEEDED FOR WHEEZING   • atorvastatin (LIPITOR) 10 MG tablet Take 1 tablet by mouth Daily. (Patient taking differently: Take 10 mg by mouth Daily. Takes every other day due to leg cramps)   • Breo Ellipta 100-25 MCG/INH inhaler INHALE ONE PUFF BY MOUTH EVERY DAY FOR BREATHING   • cetirizine (zyrTEC) 5 MG tablet Take 1 tablet by mouth Daily.   • cholecalciferol (Vitamin D, Cholecalciferol,) 25 MCG (1000 UT) tablet Take 1 tablet by mouth Daily.   • Diclofenac Sodium (VOLTAREN) 1 % gel gel Apply 4 g topically to the appropriate area as directed 4 (Four) Times a Day As Needed.   • glucose blood test strip 1  each by Other route Daily.   • hydroxychloroquine (PLAQUENIL) 200 MG tablet Take 200 mg by mouth daily.   • losartan (COZAAR) 50 MG tablet Take 2 tablets by mouth Daily. for blood pressure   • montelukast (SINGULAIR) 10 MG tablet TAKE ONE TABLET BY MOUTH EVERY NIGHT AT BEDTIME   • ondansetron (Zofran) 4 MG tablet Take 1 tablet by mouth Every 8 (Eight) Hours As Needed for Nausea or Vomiting.   • ONE TOUCH LANCETS [facility] [place] USES TWICE A DAY TO CHECK BLOOD SUGAR   • Restasis 0.05 % ophthalmic emulsion Administer 1 drop into the left eye 2 (Two) Times a Day.   • vitamin B-12 (CYANOCOBALAMIN) 1000 MCG tablet Take 1,000 mcg by mouth Daily.   • [DISCONTINUED] chlorthalidone (HYGROTON) 25 MG tablet Take 0.5 tablets by mouth Daily.   • [DISCONTINUED] levothyroxine (SYNTHROID, LEVOTHROID) 88 MCG tablet Take 1 tablet by mouth Daily.   • [DISCONTINUED] linagliptin (TRADJENTA) 5 MG tablet tablet Take 1 tablet by mouth Daily.   • [DISCONTINUED] metFORMIN (GLUCOPHAGE) 1000 MG tablet Take 1 tablet by mouth 2 (Two) Times a Day With Meals.   • [DISCONTINUED] metoprolol tartrate (LOPRESSOR) 25 MG tablet TAKE ONE TABLET BY MOUTH EVERY 12 HOURS FOR BLOOD PRESSURE   • [DISCONTINUED] traMADol (Ultram) 50 MG tablet Take 0.5 tablets by mouth Every 8 (Eight) Hours As Needed for Moderate Pain .     No current facility-administered medications on file prior to visit.         The following portions of the patient's history were reviewed and updated as appropriate: allergies, current medications, past family history, past medical history, past social history, past surgical history and problem list.    Review of Systems   Constitutional: Negative.   HENT: Positive for ear pain and hearing loss. Negative for congestion.    Eyes: Negative.    Cardiovascular: Negative.  Negative for chest pain, cyanosis, dyspnea on exertion, irregular heartbeat, leg swelling, near-syncope, orthopnea, palpitations, paroxysmal nocturnal dyspnea and syncope.   Respiratory:  "Negative.  Negative for shortness of breath.    Hematologic/Lymphatic: Negative.    Musculoskeletal: Negative.         Injury to left foot   Gastrointestinal: Negative.    Neurological: Negative.  Negative for headaches.          Objective:     /84 (BP Location: Left arm, Patient Position: Sitting, Cuff Size: Adult)   Pulse 65   Ht 157.5 cm (62\")   Wt 70.8 kg (156 lb)   SpO2 96%   BMI 28.53 kg/m²   HENT:         Comments: Earwax left ear.  Right ear not well visualized  Cardiovascular:      PMI at left midclavicular line. Normal rate. Regular rhythm. Normal S1. Normal S2.      Murmurs: There is no murmur.      No gallop. No click. No rub.   Pulses:     Intact distal pulses.   Edema:     Peripheral edema absent.   Musculoskeletal:      Comments: Dorsum of left foot is swollen tenderness lateral half of the left foot.  Range of motion ankle and toes normal         Lab Review  Lab Results   Component Value Date     06/17/2022    K 5.0 06/17/2022    CL 98 06/17/2022    BUN 14 06/17/2022    CREATININE 0.75 06/17/2022    GLUCOSE 113 (H) 06/17/2022    CALCIUM 9.3 06/17/2022    ALT 8 06/17/2022    ALKPHOS 66 06/17/2022    LABIL2 1.5 04/28/2016     Lab Results   Component Value Date    CKTOTAL 25 06/17/2022     Lab Results   Component Value Date    WBC 8.17 06/17/2022    HGB 13.2 06/17/2022    HCT 39.2 06/17/2022     06/17/2022     No results found for: INR  No results found for: MG  Lab Results   Component Value Date    TSH 1.830 06/17/2022     No results found for: BNP  Lab Results   Component Value Date    CHLPL 184 04/28/2016    CHOL 168 06/17/2022    TRIG 153 (H) 06/17/2022    HDL 50 06/17/2022    VLDL 27 06/17/2022    LDLHDL 1.75 06/17/2022     Lab Results   Component Value Date    LDL 91 06/17/2022     (H) 12/27/2021       Procedures       I personally viewed and interpreted the patient's LAB data         Assessment:     1. Left foot pain    2. Type 2 diabetes mellitus without " complication, without long-term current use of insulin (HCC)    3. Other specified hypothyroidism    4. Essential hypertension    5. Other emphysema (HCC)    6. Need for prophylactic vaccination and inoculation against influenza    7. Hypertension, unspecified type    8. Mixed hyperlipidemia    9. Hearing loss of right ear, unspecified hearing loss type          Plan:     Acute injury to left foot with foot swelling.  Range of motion of ankle and toes normal  Will arrange x-ray of the left foot.    Hearing loss and pain right ear.  Physical examination unremarkable  Will arrange ENT consult.    Blood pressure is very well controlled she will continue current medications.    Flu shot high-dose was given.  Hyperlipidemia, diabetes mellitus and hypothyroidism we will get lab work for evaluation.  COPD she will continue current medication.  Follow-up scheduled        No follow-ups on file.

## 2022-10-04 NOTE — TELEPHONE ENCOUNTER
----- Message from Evita Rosario MD sent at 10/4/2022 11:55 AM EDT -----  X-ray shows fracture.  We will make appointment with Dr. Cooley

## 2022-10-07 ENCOUNTER — OFFICE VISIT (OUTPATIENT)
Dept: ORTHOPEDIC SURGERY | Facility: CLINIC | Age: 67
End: 2022-10-07

## 2022-10-07 VITALS
DIASTOLIC BLOOD PRESSURE: 88 MMHG | BODY MASS INDEX: 28.71 KG/M2 | HEIGHT: 62 IN | HEART RATE: 73 BPM | SYSTOLIC BLOOD PRESSURE: 140 MMHG | WEIGHT: 156 LBS

## 2022-10-07 DIAGNOSIS — S92.345A CLOSED NONDISPLACED FRACTURE OF FOURTH METATARSAL BONE OF LEFT FOOT, INITIAL ENCOUNTER: Primary | ICD-10-CM

## 2022-10-07 PROCEDURE — 28470 CLTX METATARSAL FX WO MNP EA: CPT | Performed by: PHYSICIAN ASSISTANT

## 2022-10-07 NOTE — PROGRESS NOTES
[facility] [place]          Patient: Leonarda Diaz  YOB: 1955  Date of Encounter: 10/07/2022  PCP: Evita Rosario MD      Subjective     Chief Complaint   Patient presents with   • Left Foot - Initial Evaluation, Pain, Edema           History of Present Illness:     Leonarda Diaz is a 67 y.o. female presents today as result of a left foot fracture suffered 10/2/2022.  Patient states that she stepped in a depression/hole in her yard when she felt a popping sensation and pain along the dorsal lateral aspect of her midfoot.  Patient had pain and presented to her local cardiologist for her regularly scheduled appointment.  She continued to have difficulty with weightbearing and radiographs were obtained and the patient was found as having a fourth metatarsal head fracture.  The patient was asked to follow-up with orthopedics.  She has been ambulating in a normal shoe on her heel stating dull throbbing aching pain at rest with sharp stabbing pain upon ambulation.  She is unable to take NSAIDs secondary to gastric complications so she has been taking Tylenol with some improvement.  Patient reports no specific paresthesias.  She describes swelling and ecchymosis and pain upon attempted range of motion and weightbearing.        Patient Active Problem List   Diagnosis   • Acute bronchitis   • B12 deficiency*   • Chronic pain syndrome*   • Hypertension*   • Hyperlipidemia*   • Diabetes mellitus*   • Hypothyroidism*   • Vitamin D deficiency*   • Right ear pain   • Sebaceous cyst   • Chronic bronchitis (HCC)   • Age-related osteoporosis without current pathological fracture   • Tobacco use   • Environmental allergies   • Edema leg   • Chronic systolic heart failure (HCC)   • Closed fracture of multiple ribs of left side   • Chest wall pain   • Encounter for screening for malignant neoplasm of colon   • Left foot pain   • Hearing loss of right ear     Past Medical History:    Diagnosis Date   • Arthritis    • B12 deficiency    • Bronchitis, acute    • Chronic pain syndrome    • COPD (chronic obstructive pulmonary disease) (HCC)    • Diabetes mellitus (HCC)    • Disease of thyroid gland    • Elevated cholesterol    • Hyperlipidemia    • Hypertension      Past Surgical History:   Procedure Laterality Date   • CATARACT EXTRACTION, BILATERAL Bilateral     June and july 2020   • CHOLECYSTECTOMY     • COLONOSCOPY N/A 6/7/2022    Procedure: COLONOSCOPY FOR SCREENING;  Surgeon: Loren Leigh MD;  Location: [facility];  Service: Gastroenterology;  Laterality: N/A;   • COLONOSCOPY W/ BIOPSIES  2012   • HYSTERECTOMY     • VARICOSE VEIN SURGERY Right      Social History     Occupational History   • Not on file   Tobacco Use   • Smoking status: Current Every Day Smoker     Packs/day: 0.50     Years: 10.00     Pack years: 5.00     Types: Cigarettes   • Smokeless tobacco: Never Used   Vaping Use   • Vaping Use: Never used   Substance and Sexual Activity   • Alcohol use: No   • Drug use: No   • Sexual activity: Defer    Leonarda Diaz  reports that she has been smoking cigarettes. She has a 5.00 pack-year smoking history. She has never used smokeless tobacco.. I have educated her on the risk of diseases from using tobacco products such as cancer, COPD and heart disease.     I advised her to quit and she is not willing to quit.    I spent 3  minutes counseling the patient.          Social History     Social History Narrative   • Not on file     Family History   Problem Relation Age of Onset   • Thyroid cancer Mother    • Aneurysm Mother    • Hypertension Mother    • Diabetes Mother    • Arthritis Mother    • Heart attack Father    • Heart disease Father    • Diabetes Brother    • Kidney failure Brother    • Hypertension Brother    • Diabetes Brother    • Hypertension Brother    • Breast cancer Maternal Aunt      Current Outpatient Medications   Medication Sig Dispense Refill   • albuterol  sulfate  (90 Base) MCG/ACT inhaler INHALE TWO PUFFS BY MOUTH EVERY 4 HOURS AS NEEDED FOR WHEEZING 18 g 5   • atorvastatin (LIPITOR) 10 MG tablet Take 1 tablet by mouth Daily. (Patient taking differently: Take 10 mg by mouth Daily. Takes every other day due to leg cramps) 90 tablet 3   • Breo Ellipta 100-25 MCG/INH inhaler INHALE ONE PUFF BY MOUTH EVERY DAY FOR BREATHING 60 each 12   • cetirizine (zyrTEC) 5 MG tablet Take 1 tablet by mouth Daily. 30 tablet 1   • chlorthalidone (HYGROTON) 25 MG tablet Take 0.5 tablets by mouth Daily. 45 tablet 1   • cholecalciferol (Vitamin D, Cholecalciferol,) 25 MCG (1000 UT) tablet Take 1 tablet by mouth Daily. 90 tablet 0   • Diclofenac Sodium (VOLTAREN) 1 % gel gel Apply 4 g topically to the appropriate area as directed 4 (Four) Times a Day As Needed.     • glucose blood test strip 1 each by Other route Daily. 100 each 2   • hydroxychloroquine (PLAQUENIL) 200 MG tablet Take 200 mg by mouth daily.     • levothyroxine (SYNTHROID, LEVOTHROID) 88 MCG tablet Take 1 tablet by mouth Daily. 90 tablet 3   • linagliptin (TRADJENTA) 5 MG tablet tablet Take 1 tablet by mouth Daily. 90 tablet 1   • losartan (COZAAR) 50 MG tablet Take 2 tablets by mouth Daily. for blood pressure 180 tablet 2   • metFORMIN (GLUCOPHAGE) 1000 MG tablet Take 1 tablet by mouth 2 (Two) Times a Day With Meals. 180 tablet 3   • metoprolol tartrate (LOPRESSOR) 25 MG tablet Take 1 tablet by mouth 2 (Two) Times a Day. 180 tablet 1   • montelukast (SINGULAIR) 10 MG tablet TAKE ONE TABLET BY MOUTH EVERY NIGHT AT BEDTIME 90 tablet 5   • ondansetron (Zofran) 4 MG tablet Take 1 tablet by mouth Every 8 (Eight) Hours As Needed for Nausea or Vomiting. 14 tablet 0   • ONE TOUCH LANCETS [facility] [place] DAY TO CHECK BLOOD SUGAR 180 each 1   • Restasis 0.05 % ophthalmic emulsion Administer 1 drop into the left eye 2 (Two) Times a Day.     • vitamin B-12 (CYANOCOBALAMIN) 1000 MCG tablet Take 1,000 mcg by mouth Daily.    "    No current facility-administered medications for this visit.     Allergies   Allergen Reactions   • Avelox [Moxifloxacin] Hives     Burning itcing whelps allover            ROS      Objective      Vitals:    10/07/22 0939   BP: 140/88   Pulse: 73   Weight: 70.8 kg (156 lb)   Height: 157.5 cm (62\")      BMI is >= 25 and <30. (Overweight) The following options were offered after discussion;: exercise counseling/recommendations and nutrition counseling/recommendations      Physical Exam  Vitals and nursing note reviewed.   Constitutional:       General: She is not in acute distress.     Appearance: She is not ill-appearing.   HENT:      Head: Normocephalic and atraumatic.      Right Ear: External ear normal.      Left Ear: External ear normal.      Nose: Nose normal. No congestion or rhinorrhea.   Eyes:      Extraocular Movements: Extraocular movements intact.      Conjunctiva/sclera: Conjunctivae normal.      Pupils: Pupils are equal, round, and reactive to light.   Cardiovascular:      Rate and Rhythm: Normal rate.      Pulses: Normal pulses.   Pulmonary:      Effort: Pulmonary effort is normal. No respiratory distress.      Breath sounds: No stridor.   Abdominal:      General: There is no distension.   Musculoskeletal:      Cervical back: Normal range of motion.      Left foot: Decreased range of motion. Normal capillary refill. Swelling, tenderness (4th metatarsal head and mtp joint), bony tenderness and crepitus present. No deformity, foot drop, prominent metatarsal heads or laceration. Normal pulse.   Skin:     General: Skin is warm and dry.      Capillary Refill: Capillary refill takes less than 2 seconds.   Neurological:      General: No focal deficit present.      Mental Status: She is alert and oriented to person, place, and time.   Psychiatric:         Mood and Affect: Mood normal.         Behavior: Behavior normal.         Thought Content: Thought content normal.         Judgment: Judgment normal. "           Radiology:      XR Ankle 3+ View Left    Result Date: 10/4/2022    No acute findings. Calcaneal spurring.  This report was finalized on 10/4/2022 11:45 AM by Dr. Venkata Marlow MD.      XR foot 2 vw left    Result Date: 10/4/2022  1.  Nondisplaced fracture distal aspect of the left fourth metatarsal bone. 2.  Soft tissue edema.  This report was finalized on 10/4/2022 11:44 AM by Dr. Venkata Marlow MD.              Assessment/Plan        ICD-10-CM ICD-9-CM   1. Closed nondisplaced fracture of fourth metatarsal bone of left foot, initial encounter  S92.345A 825.25       67-year-old female with nondisplaced fracture involving the distal aspect of the left fourth metatarsal head.  This is relatively stable and as result of this the patient was immobilized in equalizer boot to be worn the entirety of the time and removal for bathing purposes only.  She will return back in 2 weeks for repeat radiographs and further evaluation and alignment check.  Maximum elevation and limited weightbearing were given to the patient in form of instruction.  She was provided with a rolling walker and knee scooter prescription for assistance with this.                    This document was signed by Billy Gray PA-C October 7, 2022     CC: Evita Rosario MD      Dictated Utilizing Dragon Dictation: Part of this note may be an electronic transcription/translation of spoken language to printed text using the Dragon Dictation System.

## 2022-10-20 DIAGNOSIS — S92.345D CLOSED NONDISPLACED FRACTURE OF FOURTH METATARSAL BONE OF LEFT FOOT WITH ROUTINE HEALING, SUBSEQUENT ENCOUNTER: Primary | ICD-10-CM

## 2022-10-21 ENCOUNTER — HOSPITAL ENCOUNTER (OUTPATIENT)
Dept: GENERAL RADIOLOGY | Facility: HOSPITAL | Age: 67
Discharge: HOME | End: 2022-10-21
Admitting: PHYSICIAN ASSISTANT

## 2022-10-21 ENCOUNTER — OFFICE VISIT (OUTPATIENT)
Dept: ORTHOPEDIC SURGERY | Facility: CLINIC | Age: 67
End: 2022-10-21

## 2022-10-21 VITALS — BODY MASS INDEX: 28.71 KG/M2 | HEIGHT: 62 IN | WEIGHT: 156 LBS

## 2022-10-21 DIAGNOSIS — S92.345D CLOSED NONDISPLACED FRACTURE OF FOURTH METATARSAL BONE OF LEFT FOOT WITH ROUTINE HEALING, SUBSEQUENT ENCOUNTER: Primary | ICD-10-CM

## 2022-10-21 DIAGNOSIS — S92.345D CLOSED NONDISPLACED FRACTURE OF FOURTH METATARSAL BONE OF LEFT FOOT WITH ROUTINE HEALING, SUBSEQUENT ENCOUNTER: ICD-10-CM

## 2022-10-21 PROCEDURE — 73630 X-RAY EXAM OF FOOT: CPT | Performed by: RADIOLOGY

## 2022-10-21 PROCEDURE — 73630 X-RAY EXAM OF FOOT: CPT

## 2022-10-21 PROCEDURE — 99024 POSTOP FOLLOW-UP VISIT: CPT | Performed by: PHYSICIAN ASSISTANT

## 2022-10-21 NOTE — PROGRESS NOTES
[facility] [place] [facility] Established Patient Visit          Patient: Leonarda Diaz  YOB: 1955  Date of Encounter: 10/21/2022  PCP: Evita Rosario MD      Subjective     Chief Complaint   Patient presents with   • Left Foot - Pain, Follow-up           History of Present Illness:     Leonarda Diaz is a 67 y.o. female presents today as result of a left foot fracture suffered 10/2/2022.  Patient states that she stepped in a depression/hole in her yard when she felt a popping sensation and pain along the dorsal lateral aspect of her midfoot.  Patient was found as having a minimally displaced fourth metatarsal head fracture.  The patient has been immobilized nonweightbearing in equalizer boot.  She has been compliant with this.  She reports no other new complaints.  She describes decreasing swelling and resolution of ecchymosis.  She reports no other new complaints.  Patient has been compliant with usage of a knee scooter and nonweightbearing status.  Denies any paresthesias.        Patient Active Problem List   Diagnosis   • Acute bronchitis   • B12 deficiency*   • Chronic pain syndrome*   • Hypertension*   • Hyperlipidemia*   • Diabetes mellitus*   • Hypothyroidism*   • Vitamin D deficiency*   • Right ear pain   • Sebaceous cyst   • Chronic bronchitis (HCC)   • Age-related osteoporosis without current pathological fracture   • Tobacco use   • Environmental allergies   • Edema leg   • Chronic systolic heart failure (HCC)   • Closed fracture of multiple ribs of left side   • Chest wall pain   • Encounter for screening for malignant neoplasm of colon   • Left foot pain   • Hearing loss of right ear     Past Medical History:   Diagnosis Date   • Arthritis    • B12 deficiency    • Bronchitis, acute    • Chronic pain syndrome    • COPD (chronic obstructive pulmonary disease) (HCC)    • Diabetes mellitus (HCC)    • Disease of thyroid gland    • Elevated cholesterol    • Hyperlipidemia    • Hypertension       Past Surgical History:   Procedure Laterality Date   • CATARACT EXTRACTION, BILATERAL Bilateral     June and july 2020   • CHOLECYSTECTOMY     • COLONOSCOPY N/A 6/7/2022    Procedure: COLONOSCOPY FOR SCREENING;  Surgeon: Loren Leigh MD;  Location: [facility];  Service: Gastroenterology;  Laterality: N/A;   • COLONOSCOPY W/ BIOPSIES  2012   • HYSTERECTOMY     • VARICOSE VEIN SURGERY Right      Social History     Occupational History   • Not on file   Tobacco Use   • Smoking status: Every Day     Packs/day: 0.50     Years: 10.00     Pack years: 5.00     Types: Cigarettes   • Smokeless tobacco: Never   Vaping Use   • Vaping Use: Never used   Substance and Sexual Activity   • Alcohol use: No   • Drug use: No   • Sexual activity: Defer    Leonarda Diaz  reports that she has been smoking cigarettes. She has a 5.00 pack-year smoking history. She has never used smokeless tobacco.. I have educated her on the risk of diseases from using tobacco products such as cancer, COPD and heart disease.     I advised her to quit and she is not willing to quit.            Social History     Social History Narrative   • Not on file     Family History   Problem Relation Age of Onset   • Thyroid cancer Mother    • Aneurysm Mother    • Hypertension Mother    • Diabetes Mother    • Arthritis Mother    • Heart attack Father    • Heart disease Father    • Diabetes Brother    • Kidney failure Brother    • Hypertension Brother    • Diabetes Brother    • Hypertension Brother    • Breast cancer Maternal Aunt      Current Outpatient Medications   Medication Sig Dispense Refill   • albuterol sulfate  (90 Base) MCG/ACT inhaler INHALE TWO PUFFS BY MOUTH EVERY 4 HOURS AS NEEDED FOR WHEEZING 18 g 5   • atorvastatin (LIPITOR) 10 MG tablet Take 1 tablet by mouth Daily. (Patient taking differently: Take 1 tablet by mouth Daily. Takes every other day due to leg cramps) 90 tablet 3   • Breo Ellipta 100-25 MCG/INH inhaler INHALE ONE PUFF  BY MOUTH EVERY DAY FOR BREATHING 60 each 12   • cetirizine (zyrTEC) 5 MG tablet Take 1 tablet by mouth Daily. 30 tablet 1   • chlorthalidone (HYGROTON) 25 MG tablet Take 0.5 tablets by mouth Daily. 45 tablet 1   • cholecalciferol (Vitamin D, Cholecalciferol,) 25 MCG (1000 UT) tablet Take 1 tablet by mouth Daily. 90 tablet 0   • Diclofenac Sodium (VOLTAREN) 1 % gel gel Apply 4 g topically to the appropriate area as directed 4 (Four) Times a Day As Needed.     • glucose blood test strip 1 each by Other route Daily. 100 each 2   • hydroxychloroquine (PLAQUENIL) 200 MG tablet Take 200 mg by mouth daily.     • levothyroxine (SYNTHROID, LEVOTHROID) 88 MCG tablet Take 1 tablet by mouth Daily. 90 tablet 3   • linagliptin (TRADJENTA) 5 MG tablet tablet Take 1 tablet by mouth Daily. 90 tablet 1   • losartan (COZAAR) 50 MG tablet Take 2 tablets by mouth Daily. for blood pressure 180 tablet 2   • metFORMIN (GLUCOPHAGE) 1000 MG tablet Take 1 tablet by mouth 2 (Two) Times a Day With Meals. 180 tablet 3   • metoprolol tartrate (LOPRESSOR) 25 MG tablet Take 1 tablet by mouth 2 (Two) Times a Day. 180 tablet 1   • montelukast (SINGULAIR) 10 MG tablet TAKE ONE TABLET BY MOUTH EVERY NIGHT AT BEDTIME 90 tablet 5   • ondansetron (Zofran) 4 MG tablet Take 1 tablet by mouth Every 8 (Eight) Hours As Needed for Nausea or Vomiting. 14 tablet 0   • ONE TOUCH LANCETS [facility] USES TWICE A DAY TO CHECK BLOOD SUGAR 180 each 1   • Restasis 0.05 % ophthalmic emulsion Administer 1 drop into the left eye 2 (Two) Times a Day.     • vitamin B-12 (CYANOCOBALAMIN) 1000 MCG tablet Take 1,000 mcg by mouth Daily.       No current facility-administered medications for this visit.     Allergies   Allergen Reactions   • Avelox [Moxifloxacin] Hives     Burning itcing whelps allover            Review of Systems   Constitutional: Negative.   HENT: Negative.    Eyes: Negative.    Cardiovascular: Negative.    Respiratory: Negative.    Endocrine: Negative.   "  Hematologic/Lymphatic: Negative.    Skin: Negative.    Musculoskeletal:        Pertinent positives listed in HPI   Gastrointestinal: Negative.    Genitourinary: Negative.    Neurological: Negative.    Psychiatric/Behavioral: Negative.    Allergic/Immunologic: Negative.          Objective      Vitals:    10/21/22 0910   Weight: 70.8 kg (156 lb)   Height: 157.5 cm (62\")      BMI is >= 25 and <30. (Overweight) The following options were offered after discussion;: exercise counseling/recommendations and nutrition counseling/recommendations      Physical Exam  Vitals and nursing note reviewed.   Constitutional:       General: She is not in acute distress.     Appearance: She is not ill-appearing.   HENT:      Head: Normocephalic and atraumatic.      Right Ear: External ear normal.      Left Ear: External ear normal.      Nose: Nose normal. No congestion or rhinorrhea.   Eyes:      Extraocular Movements: Extraocular movements intact.      Conjunctiva/sclera: Conjunctivae normal.      Pupils: Pupils are equal, round, and reactive to light.   Cardiovascular:      Rate and Rhythm: Normal rate.      Pulses: Normal pulses.   Pulmonary:      Effort: Pulmonary effort is normal. No respiratory distress.      Breath sounds: No stridor.   Abdominal:      General: There is no distension.   Musculoskeletal:      Cervical back: Normal range of motion.      Left foot: Decreased range of motion. Normal capillary refill. Swelling, tenderness (4th metatarsal head and mtp joint), bony tenderness and crepitus present. No deformity, foot drop, prominent metatarsal heads or laceration. Normal pulse.   Skin:     General: Skin is warm and dry.      Capillary Refill: Capillary refill takes less than 2 seconds.   Neurological:      General: No focal deficit present.      Mental Status: She is alert and oriented to person, place, and time.   Psychiatric:         Mood and Affect: Mood normal.         Behavior: Behavior normal.         Thought " Content: Thought content normal.         Judgment: Judgment normal.           Radiology:      XR Ankle 3+ View Left    Result Date: 10/4/2022    No acute findings. Calcaneal spurring.  This report was finalized on 10/4/2022 11:45 AM by Dr. Venkata Marlow MD.      XR foot 2 vw left    Result Date: 10/4/2022  1.  Nondisplaced fracture distal aspect of the left fourth metatarsal bone. 2.  Soft tissue edema.  This report was finalized on 10/4/2022 11:44 AM by Dr. Venkata Marlow MD.      XR Foot 3+ View Left    Result Date: 10/21/2022  Stable alignment of the avulsion type fracture of the distal 4th metatarsal.  This report was finalized on 10/21/2022 9:54 AM by Dr. Ben Negro II, MD.              Assessment/Plan        ICD-10-CM ICD-9-CM   1. Closed nondisplaced fracture of fourth metatarsal bone of left foot with routine healing, subsequent encounter  S92.345D V54.19         67-year-old female with nondisplaced fracture involving the distal aspect of the left fourth metatarsal head.  This is relatively stable and as result she will continue in the equalizer boot to be worn the entirety of the time and removal for bathing purposes only.  She will return back in 4 weeks for repeat radiographs and further evaluation and alignment/callus check.  Maximum elevation and limited weightbearing were given to the patient in form of instruction.                     This document was signed by Billy Gray PA-C October 21, 2022     CC: Evita Rosario MD      Dictated Utilizing Dragon Dictation: Part of this note may be an electronic transcription/translation of spoken language to printed text using the Dragon Dictation System.

## 2022-11-17 DIAGNOSIS — S92.345D CLOSED NONDISPLACED FRACTURE OF FOURTH METATARSAL BONE OF LEFT FOOT WITH ROUTINE HEALING, SUBSEQUENT ENCOUNTER: Primary | ICD-10-CM

## 2022-11-18 ENCOUNTER — OFFICE VISIT (OUTPATIENT)
Dept: ORTHOPEDIC SURGERY | Facility: CLINIC | Age: 67
End: 2022-11-18

## 2022-11-18 ENCOUNTER — HOSPITAL ENCOUNTER (OUTPATIENT)
Dept: GENERAL RADIOLOGY | Facility: HOSPITAL | Age: 67
Discharge: HOME | End: 2022-11-18
Admitting: PHYSICIAN ASSISTANT

## 2022-11-18 VITALS — WEIGHT: 156 LBS | BODY MASS INDEX: 28.71 KG/M2 | HEIGHT: 62 IN

## 2022-11-18 DIAGNOSIS — S92.345D CLOSED NONDISPLACED FRACTURE OF FOURTH METATARSAL BONE OF LEFT FOOT WITH ROUTINE HEALING, SUBSEQUENT ENCOUNTER: Primary | ICD-10-CM

## 2022-11-18 DIAGNOSIS — S92.345D CLOSED NONDISPLACED FRACTURE OF FOURTH METATARSAL BONE OF LEFT FOOT WITH ROUTINE HEALING, SUBSEQUENT ENCOUNTER: ICD-10-CM

## 2022-11-18 PROCEDURE — 73630 X-RAY EXAM OF FOOT: CPT

## 2022-11-18 PROCEDURE — 99024 POSTOP FOLLOW-UP VISIT: CPT | Performed by: PHYSICIAN ASSISTANT

## 2022-11-18 PROCEDURE — 73630 X-RAY EXAM OF FOOT: CPT | Performed by: RADIOLOGY

## 2022-12-07 ENCOUNTER — LAB (OUTPATIENT)
Dept: LAB | Facility: HOSPITAL | Age: 67
End: 2022-12-07

## 2022-12-07 DIAGNOSIS — E03.8 OTHER SPECIFIED HYPOTHYROIDISM: ICD-10-CM

## 2022-12-07 DIAGNOSIS — E11.9 TYPE 2 DIABETES MELLITUS WITHOUT COMPLICATION, WITHOUT LONG-TERM CURRENT USE OF INSULIN: ICD-10-CM

## 2022-12-07 DIAGNOSIS — E78.2 MIXED HYPERLIPIDEMIA: ICD-10-CM

## 2022-12-07 LAB
ALBUMIN SERPL-MCNC: 4.3 G/DL (ref 3.5–5.2)
ALBUMIN UR-MCNC: <1.2 MG/DL
ALBUMIN/GLOB SERPL: 1.7 G/DL
ALP SERPL-CCNC: 60 U/L (ref 39–117)
ALT SERPL W P-5'-P-CCNC: 7 U/L (ref 1–33)
ANION GAP SERPL CALCULATED.3IONS-SCNC: 12 MMOL/L (ref 5–15)
AST SERPL-CCNC: 11 U/L (ref 1–32)
BASOPHILS # BLD AUTO: 0.08 10*3/MM3 (ref 0–0.2)
BASOPHILS NFR BLD AUTO: 0.8 % (ref 0–1.5)
BILIRUB SERPL-MCNC: 0.3 MG/DL (ref 0–1.2)
BUN SERPL-MCNC: 16 MG/DL (ref 8–23)
BUN/CREAT SERPL: 15.8 (ref 7–25)
CALCIUM SPEC-SCNC: 9.5 MG/DL (ref 8.6–10.5)
CHLORIDE SERPL-SCNC: 99 MMOL/L (ref 98–107)
CHOLEST SERPL-MCNC: 162 MG/DL (ref 0–200)
CK SERPL-CCNC: 32 U/L (ref 20–180)
CO2 SERPL-SCNC: 25 MMOL/L (ref 22–29)
CREAT SERPL-MCNC: 1.01 MG/DL (ref 0.57–1)
DEPRECATED RDW RBC AUTO: 42.6 FL (ref 37–54)
EGFRCR SERPLBLD CKD-EPI 2021: 60.8 ML/MIN/1.73
EOSINOPHIL # BLD AUTO: 0.15 10*3/MM3 (ref 0–0.4)
EOSINOPHIL NFR BLD AUTO: 1.6 % (ref 0.3–6.2)
ERYTHROCYTE [DISTWIDTH] IN BLOOD BY AUTOMATED COUNT: 12.5 % (ref 12.3–15.4)
GLOBULIN UR ELPH-MCNC: 2.5 GM/DL
GLUCOSE SERPL-MCNC: 117 MG/DL (ref 65–99)
HBA1C MFR BLD: 6 % (ref 4.8–5.6)
HCT VFR BLD AUTO: 37.8 % (ref 34–46.6)
HDLC SERPL-MCNC: 45 MG/DL (ref 40–60)
HGB BLD-MCNC: 12.9 G/DL (ref 12–15.9)
IMM GRANULOCYTES # BLD AUTO: 0.03 10*3/MM3 (ref 0–0.05)
IMM GRANULOCYTES NFR BLD AUTO: 0.3 % (ref 0–0.5)
LDLC SERPL CALC-MCNC: 89 MG/DL (ref 0–100)
LDLC/HDLC SERPL: 1.88 {RATIO}
LYMPHOCYTES # BLD AUTO: 1.61 10*3/MM3 (ref 0.7–3.1)
LYMPHOCYTES NFR BLD AUTO: 16.9 % (ref 19.6–45.3)
MCH RBC QN AUTO: 31.8 PG (ref 26.6–33)
MCHC RBC AUTO-ENTMCNC: 34.1 G/DL (ref 31.5–35.7)
MCV RBC AUTO: 93.1 FL (ref 79–97)
MONOCYTES # BLD AUTO: 0.63 10*3/MM3 (ref 0.1–0.9)
MONOCYTES NFR BLD AUTO: 6.6 % (ref 5–12)
NEUTROPHILS NFR BLD AUTO: 7.05 10*3/MM3 (ref 1.7–7)
NEUTROPHILS NFR BLD AUTO: 73.8 % (ref 42.7–76)
NRBC BLD AUTO-RTO: 0 /100 WBC (ref 0–0.2)
PLATELET # BLD AUTO: 356 10*3/MM3 (ref 140–450)
PMV BLD AUTO: 10.5 FL (ref 6–12)
POTASSIUM SERPL-SCNC: 4.6 MMOL/L (ref 3.5–5.2)
PROT SERPL-MCNC: 6.8 G/DL (ref 6–8.5)
RBC # BLD AUTO: 4.06 10*6/MM3 (ref 3.77–5.28)
SODIUM SERPL-SCNC: 136 MMOL/L (ref 136–145)
T4 FREE SERPL-MCNC: 1.83 NG/DL (ref 0.93–1.7)
TRIGL SERPL-MCNC: 163 MG/DL (ref 0–150)
TSH SERPL DL<=0.05 MIU/L-ACNC: 2.08 UIU/ML (ref 0.27–4.2)
VLDLC SERPL-MCNC: 28 MG/DL (ref 5–40)
WBC NRBC COR # BLD: 9.55 10*3/MM3 (ref 3.4–10.8)

## 2022-12-07 PROCEDURE — 82550 ASSAY OF CK (CPK): CPT

## 2022-12-07 PROCEDURE — 80061 LIPID PANEL: CPT

## 2022-12-07 PROCEDURE — 85025 COMPLETE CBC W/AUTO DIFF WBC: CPT

## 2022-12-07 PROCEDURE — 36415 COLL VENOUS BLD VENIPUNCTURE: CPT

## 2022-12-07 PROCEDURE — 80053 COMPREHEN METABOLIC PANEL: CPT

## 2022-12-07 PROCEDURE — 83036 HEMOGLOBIN GLYCOSYLATED A1C: CPT

## 2022-12-07 PROCEDURE — 84439 ASSAY OF FREE THYROXINE: CPT

## 2022-12-07 PROCEDURE — 82043 UR ALBUMIN QUANTITATIVE: CPT

## 2022-12-07 PROCEDURE — 84443 ASSAY THYROID STIM HORMONE: CPT

## 2022-12-08 ENCOUNTER — TELEPHONE (OUTPATIENT)
Dept: CARDIOLOGY | Facility: CLINIC | Age: 67
End: 2022-12-08

## 2022-12-08 RX ORDER — LEVOTHYROXINE SODIUM 0.07 MG/1
75 TABLET ORAL DAILY
Qty: 90 TABLET | Refills: 0 | Status: SHIPPED | OUTPATIENT
Start: 2022-12-08 | End: 2023-01-04 | Stop reason: SDUPTHER

## 2022-12-08 NOTE — TELEPHONE ENCOUNTER
----- Message from Evita Rosario MD sent at 12/8/2022 11:26 AM EST -----  Thyroid level is borderline elevated, decrease Synthroid 75 mcg daily.  Call in Synthroid 75 mcg daily

## 2022-12-08 NOTE — PROGRESS NOTES
Thyroid level is borderline elevated, decrease Synthroid 75 mcg daily.  Call in Synthroid 75 mcg daily

## 2022-12-12 DIAGNOSIS — S92.345D CLOSED NONDISPLACED FRACTURE OF FOURTH METATARSAL BONE OF LEFT FOOT WITH ROUTINE HEALING, SUBSEQUENT ENCOUNTER: Primary | ICD-10-CM

## 2022-12-16 ENCOUNTER — HOSPITAL ENCOUNTER (OUTPATIENT)
Dept: GENERAL RADIOLOGY | Facility: HOSPITAL | Age: 67
Discharge: HOME | End: 2022-12-16
Admitting: PHYSICIAN ASSISTANT

## 2022-12-16 ENCOUNTER — OFFICE VISIT (OUTPATIENT)
Dept: ORTHOPEDIC SURGERY | Facility: CLINIC | Age: 67
End: 2022-12-16

## 2022-12-16 VITALS — WEIGHT: 156 LBS | BODY MASS INDEX: 28.71 KG/M2 | HEIGHT: 62 IN

## 2022-12-16 DIAGNOSIS — S92.345D CLOSED NONDISPLACED FRACTURE OF FOURTH METATARSAL BONE OF LEFT FOOT WITH ROUTINE HEALING, SUBSEQUENT ENCOUNTER: Primary | ICD-10-CM

## 2022-12-16 DIAGNOSIS — S92.345D CLOSED NONDISPLACED FRACTURE OF FOURTH METATARSAL BONE OF LEFT FOOT WITH ROUTINE HEALING, SUBSEQUENT ENCOUNTER: ICD-10-CM

## 2022-12-16 PROCEDURE — 99024 POSTOP FOLLOW-UP VISIT: CPT | Performed by: PHYSICIAN ASSISTANT

## 2022-12-16 PROCEDURE — 73630 X-RAY EXAM OF FOOT: CPT | Performed by: RADIOLOGY

## 2022-12-16 PROCEDURE — 73630 X-RAY EXAM OF FOOT: CPT

## 2022-12-16 NOTE — PROGRESS NOTES
[facility] Established Patient Note      Date:12/16/2022  Patient: Leonarda Diaz  YOB: 1955  PCP: Evita Rosario MD      Subjective     Chief Complaint:  Chief Complaint   Patient presents with   • Left Foot - Follow-up, Fracture, Pain         History of Present Illness:     Leonarda Diaz is a 67 y.o. female presents today for follow up evaluation closed nondisplaced fracture involving the left fourth metatarsal head.  The patient has progressed out of immobilization and implemented ambulation without significant complication.  She reports only mild dull pain upon prolonged standing or ambulation.  She reports no other new complaints.  Denies any paresthesias.          Patient Active Problem List   Diagnosis   • Acute bronchitis   • B12 deficiency*   • Chronic pain syndrome*   • Hypertension*   • Hyperlipidemia*   • Diabetes mellitus*   • Hypothyroidism*   • Vitamin D deficiency*   • Right ear pain   • Sebaceous cyst   • Chronic bronchitis (HCC)   • Age-related osteoporosis without current pathological fracture   • Tobacco use   • Environmental allergies   • Edema leg   • Chronic systolic heart failure (HCC)   • Closed fracture of multiple ribs of left side   • Chest wall pain   • Encounter for screening for malignant neoplasm of colon   • Left foot pain   • Hearing loss of right ear     Past Medical History:   Diagnosis Date   • Arthritis    • B12 deficiency    • Bronchitis, acute    • Chronic pain syndrome    • COPD (chronic obstructive pulmonary disease) (HCC)    • Diabetes mellitus (HCC)    • Disease of thyroid gland    • Elevated cholesterol    • Hyperlipidemia    • Hypertension      Past Surgical History:   Procedure Laterality Date   • CATARACT EXTRACTION, BILATERAL Bilateral     June and july 2020   • CHOLECYSTECTOMY     • COLONOSCOPY N/A 6/7/2022    Procedure: COLONOSCOPY FOR SCREENING;  Surgeon: Loren Leigh MD;  Location: [facility] OR;  Service:  Gastroenterology;  Laterality: N/A;   • COLONOSCOPY W/ BIOPSIES  2012   • HYSTERECTOMY     • VARICOSE VEIN SURGERY Right      Family History   Problem Relation Age of Onset   • Thyroid cancer Mother    • Aneurysm Mother    • Hypertension Mother    • Diabetes Mother    • Arthritis Mother    • Heart attack Father    • Heart disease Father    • Diabetes Brother    • Kidney failure Brother    • Hypertension Brother    • Diabetes Brother    • Hypertension Brother    • Breast cancer Maternal Aunt      Social History     Occupational History   • Not on file   Tobacco Use   • Smoking status: Every Day     Packs/day: 0.50     Years: 10.00     Pack years: 5.00     Types: Cigarettes   • Smokeless tobacco: Never   Vaping Use   • Vaping Use: Never used   Substance and Sexual Activity   • Alcohol use: No   • Drug use: No   • Sexual activity: Defer     Leonarda Diaz  reports that she has been smoking cigarettes. She has a 5.00 pack-year smoking history. She has never used smokeless tobacco.. I have educated her on the risk of diseases from using tobacco products such as cancer, COPD and heart disease.     I advised her to quit and she is not willing to quit.    I spent 3  minutes counseling the patient.         Current Outpatient Medications   Medication Sig Dispense Refill   • albuterol sulfate  (90 Base) MCG/ACT inhaler INHALE TWO PUFFS BY MOUTH EVERY 4 HOURS AS NEEDED FOR WHEEZING 18 g 5   • atorvastatin (LIPITOR) 10 MG tablet Take 1 tablet by mouth Daily. (Patient taking differently: Take 10 mg by mouth Daily. Takes every other day due to leg cramps) 90 tablet 3   • Breo Ellipta 100-25 MCG/INH inhaler INHALE ONE PUFF BY MOUTH EVERY DAY FOR BREATHING 60 each 12   • cetirizine (zyrTEC) 5 MG tablet Take 1 tablet by mouth Daily. 30 tablet 1   • chlorthalidone (HYGROTON) 25 MG tablet Take 0.5 tablets by mouth Daily. 45 tablet 1   • cholecalciferol (Vitamin D, Cholecalciferol,) 25 MCG (1000 UT) tablet Take 1 tablet by mouth  Daily. 90 tablet 0   • Diclofenac Sodium (VOLTAREN) 1 % gel gel Apply 4 g topically to the appropriate area as directed 4 (Four) Times a Day As Needed.     • glucose blood test strip 1 each by Other route Daily. 100 each 2   • hydroxychloroquine (PLAQUENIL) 200 MG tablet Take 200 mg by mouth daily.     • levothyroxine (Synthroid) 75 MCG tablet Take 1 tablet by mouth Daily. 90 tablet 0   • linagliptin (TRADJENTA) 5 MG tablet tablet Take 1 tablet by mouth Daily. 90 tablet 1   • losartan (COZAAR) 50 MG tablet Take 2 tablets by mouth Daily. for blood pressure 180 tablet 2   • metFORMIN (GLUCOPHAGE) 1000 MG tablet Take 1 tablet by mouth 2 (Two) Times a Day With Meals. 180 tablet 3   • metoprolol tartrate (LOPRESSOR) 25 MG tablet Take 1 tablet by mouth 2 (Two) Times a Day. 180 tablet 1   • montelukast (SINGULAIR) 10 MG tablet TAKE ONE TABLET BY MOUTH EVERY NIGHT AT BEDTIME 90 tablet 5   • ondansetron (Zofran) 4 MG tablet Take 1 tablet by mouth Every 8 (Eight) Hours As Needed for Nausea or Vomiting. 14 tablet 0   • ONE TOUCH LANCETS [facility] USES TWICE A DAY TO CHECK BLOOD SUGAR 180 each 1   • Restasis 0.05 % ophthalmic emulsion Administer 1 drop into the left eye 2 (Two) Times a Day.     • vitamin B-12 (CYANOCOBALAMIN) 1000 MCG tablet Take 1,000 mcg by mouth Daily.       No current facility-administered medications for this visit.     Allergies   Allergen Reactions   • Avelox [Moxifloxacin] Hives     Burning itcing whelps allover       Review of Systems   Constitutional: Negative.   HENT: Negative.    Eyes: Negative.    Cardiovascular: Negative.    Respiratory: Negative.    Endocrine: Negative.    Hematologic/Lymphatic: Negative.    Skin: Negative.    Musculoskeletal:        Pertinent positives listed in HPI   Gastrointestinal: Negative.    Genitourinary: Negative.    Neurological: Negative.    Psychiatric/Behavioral: Negative.    Allergic/Immunologic: Negative.          Objective      Vitals:    12/16/22 0857   Weight: 70.8  "kg (156 lb)   Height: 157.5 cm (62\")     BMI is >= 25 and <30. (Overweight) The following options were offered after discussion;: exercise counseling/recommendations and nutrition counseling/recommendations      Physical Exam  Vitals and nursing note reviewed.   Constitutional:       General: She is not in acute distress.     Appearance: She is not ill-appearing.   HENT:      Head: Normocephalic and atraumatic.      Right Ear: External ear normal.      Left Ear: External ear normal.      Nose: Nose normal. No congestion or rhinorrhea.   Eyes:      Extraocular Movements: Extraocular movements intact.      Conjunctiva/sclera: Conjunctivae normal.      Pupils: Pupils are equal, round, and reactive to light.   Cardiovascular:      Rate and Rhythm: Normal rate.      Pulses: Normal pulses.   Pulmonary:      Effort: Pulmonary effort is normal. No respiratory distress.      Breath sounds: No stridor.   Abdominal:      General: There is no distension.   Musculoskeletal:      Cervical back: Normal range of motion.      Left foot: Normal range of motion and normal capillary refill. Tenderness (4th metatarsal head and mtp joint (decreasing)) and bony tenderness present. No swelling, deformity, foot drop, prominent metatarsal heads, laceration or crepitus. Normal pulse.   Skin:     General: Skin is warm and dry.      Capillary Refill: Capillary refill takes less than 2 seconds.   Neurological:      General: No focal deficit present.      Mental Status: She is alert and oriented to person, place, and time.   Psychiatric:         Mood and Affect: Mood normal.         Behavior: Behavior normal.         Thought Content: Thought content normal.         Judgment: Judgment normal.           Radiology:       Repeat radiographs today 3 views left foot reveals continued evidence of bony callus formation and healing involving the fourth metatarsal head.  There is no significant change in alignment or positioning.        Assessment/Plan  "       ICD-10-CM ICD-9-CM   1. Closed nondisplaced fracture of fourth metatarsal bone of left foot with routine healing, subsequent encounter  S92.345D V54.19         Plan/Discussion:    67-year-old female approaching 10 weeks status post previous injury which patient suffered a nondisplaced left fourth metatarsal head fracture.  The patient has continued to see evidence of healing as well as clinical and radiographic improvement.  She was instructed to return back on an as-needed basis upon any further complication or worsening of pain/symptoms as she is reaching clinical and radiographic union.  No direct surgical indication.  Follow-up as needed.                      This document was signed by Billy Gray PA-C December 16, 2022       CC: Evita Rosario MD    Dictated Utilizing Dragon Dictation     Please note that portions of this note were completed with a voice recognition program.     Part of this note may be an electronic transcription/translation of spoken language to printed text using the Dragon Dictation System.

## 2023-01-04 ENCOUNTER — OFFICE VISIT (OUTPATIENT)
Dept: CARDIOLOGY | Facility: CLINIC | Age: 68
End: 2023-01-04
Payer: MEDICARE

## 2023-01-04 VITALS
WEIGHT: 155 LBS | HEIGHT: 62 IN | OXYGEN SATURATION: 98 % | DIASTOLIC BLOOD PRESSURE: 92 MMHG | BODY MASS INDEX: 28.52 KG/M2 | SYSTOLIC BLOOD PRESSURE: 146 MMHG | HEART RATE: 64 BPM

## 2023-01-04 DIAGNOSIS — I10 PRIMARY HYPERTENSION: ICD-10-CM

## 2023-01-04 DIAGNOSIS — E11.9 TYPE 2 DIABETES MELLITUS WITHOUT COMPLICATION, WITHOUT LONG-TERM CURRENT USE OF INSULIN: ICD-10-CM

## 2023-01-04 DIAGNOSIS — E03.9 HYPOTHYROIDISM, UNSPECIFIED TYPE: ICD-10-CM

## 2023-01-04 DIAGNOSIS — E78.2 MIXED HYPERLIPIDEMIA: Primary | ICD-10-CM

## 2023-01-04 PROCEDURE — 99214 OFFICE O/P EST MOD 30 MIN: CPT | Performed by: INTERNAL MEDICINE

## 2023-01-04 RX ORDER — LEVOTHYROXINE SODIUM 0.07 MG/1
75 TABLET ORAL DAILY
Qty: 90 TABLET | Refills: 1 | Status: SHIPPED | OUTPATIENT
Start: 2023-01-04 | End: 2023-04-04 | Stop reason: SDUPTHER

## 2023-01-04 RX ORDER — FLUTICASONE FUROATE AND VILANTEROL 100; 25 UG/1; UG/1
1 POWDER RESPIRATORY (INHALATION)
Qty: 60 EACH | Refills: 12 | Status: SHIPPED | OUTPATIENT
Start: 2023-01-04 | End: 2023-04-04 | Stop reason: SDUPTHER

## 2023-01-04 NOTE — PROGRESS NOTES
subjective     Chief Complaint   Patient presents with   • Hypertension     Follow up    • Results     labs     History of Present Illness  Patient is 67 years old white female who is here for follow-up.  He has history of hypertension diabetes mellitus COPD, hypothyroidism and hyperlipidemia.  She is taking her medications regularly and had lab work done recently  Lab work will be reviewed.  Blood pressure is mildly elevated today .  She denies any chest pain palpitations or shortness of breath.    Past Surgical History:   Procedure Laterality Date   • CATARACT EXTRACTION, BILATERAL Bilateral     June and july 2020   • CHOLECYSTECTOMY     • COLONOSCOPY N/A 6/7/2022    Procedure: COLONOSCOPY FOR SCREENING;  Surgeon: Loren Leigh MD;  Location: [facility];  Service: Gastroenterology;  Laterality: N/A;   • COLONOSCOPY W/ BIOPSIES  2012   • HYSTERECTOMY     • VARICOSE VEIN SURGERY Right      Family History   Problem Relation Age of Onset   • Thyroid cancer Mother    • Aneurysm Mother    • Hypertension Mother    • Diabetes Mother    • Arthritis Mother    • Heart attack Father    • Heart disease Father    • Diabetes Brother    • Kidney failure Brother    • Hypertension Brother    • Diabetes Brother    • Hypertension Brother    • Breast cancer Maternal Aunt      Past Medical History:   Diagnosis Date   • Arthritis    • B12 deficiency    • Bronchitis, acute    • Chronic pain syndrome    • COPD (chronic obstructive pulmonary disease) (HCC)    • Diabetes mellitus (HCC)    • Disease of thyroid gland    • Elevated cholesterol    • Hyperlipidemia    • Hypertension      Patient Active Problem List   Diagnosis   • Acute bronchitis   • B12 deficiency*   • Chronic pain syndrome*   • Hypertension*   • Hyperlipidemia*   • Diabetes mellitus*   • Hypothyroidism*   • Vitamin D deficiency*   • Right ear pain   • Sebaceous cyst   • Chronic bronchitis (HCC)   • Age-related osteoporosis without current pathological fracture   •  Tobacco use   • Environmental allergies   • Edema leg   • Chronic systolic heart failure (HCC)   • Closed fracture of multiple ribs of left side   • Chest wall pain   • Encounter for screening for malignant neoplasm of colon   • Left foot pain   • Hearing loss of right ear       Social History     Tobacco Use   • Smoking status: Every Day     Packs/day: 0.50     Years: 10.00     Pack years: 5.00     Types: Cigarettes   • Smokeless tobacco: Never   Vaping Use   • Vaping Use: Never used   Substance Use Topics   • Alcohol use: No   • Drug use: No       Allergies   Allergen Reactions   • Avelox [Moxifloxacin] Hives     Burning itcing whelps allover       Current Outpatient Medications on File Prior to Visit   Medication Sig   • albuterol sulfate  (90 Base) MCG/ACT inhaler INHALE TWO PUFFS BY MOUTH EVERY 4 HOURS AS NEEDED FOR WHEEZING   • atorvastatin (LIPITOR) 10 MG tablet Take 1 tablet by mouth Daily. (Patient taking differently: Take 10 mg by mouth Daily. Takes every other day due to leg cramps)   • cetirizine (zyrTEC) 5 MG tablet Take 1 tablet by mouth Daily.   • chlorthalidone (HYGROTON) 25 MG tablet Take 0.5 tablets by mouth Daily.   • cholecalciferol (Vitamin D, Cholecalciferol,) 25 MCG (1000 UT) tablet Take 1 tablet by mouth Daily.   • Diclofenac Sodium (VOLTAREN) 1 % gel gel Apply 4 g topically to the appropriate area as directed 4 (Four) Times a Day As Needed.   • glucose blood test strip 1 each by Other route Daily.   • hydroxychloroquine (PLAQUENIL) 200 MG tablet Take 200 mg by mouth daily.   • losartan (COZAAR) 50 MG tablet Take 2 tablets by mouth Daily. for blood pressure   • metFORMIN (GLUCOPHAGE) 1000 MG tablet Take 1 tablet by mouth 2 (Two) Times a Day With Meals.   • metoprolol tartrate (LOPRESSOR) 25 MG tablet Take 1 tablet by mouth 2 (Two) Times a Day.   • montelukast (SINGULAIR) 10 MG tablet TAKE ONE TABLET BY MOUTH EVERY NIGHT AT BEDTIME   • ondansetron (Zofran) 4 MG tablet Take 1 tablet by  mouth Every 8 (Eight) Hours As Needed for Nausea or Vomiting.   • ONE TOUCH LANCETS [facility] USES TWICE A DAY TO CHECK BLOOD SUGAR   • Restasis 0.05 % ophthalmic emulsion Administer 1 drop into the left eye 2 (Two) Times a Day.   • vitamin B-12 (CYANOCOBALAMIN) 1000 MCG tablet Take 1,000 mcg by mouth Daily.     No current facility-administered medications on file prior to visit.         The following portions of the patient's history were reviewed and updated as appropriate: allergies, current medications, past family history, past medical history, past social history, past surgical history and problem list.    Review of Systems   Constitutional: Negative.   HENT: Negative.  Negative for congestion.    Eyes: Negative.    Cardiovascular: Negative.  Negative for chest pain, cyanosis, dyspnea on exertion, irregular heartbeat, leg swelling, near-syncope, orthopnea, palpitations, paroxysmal nocturnal dyspnea and syncope.   Respiratory: Negative.  Negative for shortness of breath.    Hematologic/Lymphatic: Negative.    Musculoskeletal: Negative.    Gastrointestinal: Negative.    Neurological: Negative.  Negative for headaches.          Objective:     /92 (BP Location: Left arm, Patient Position: Sitting, Cuff Size: Adult)   Pulse 64   Ht 157.5 cm (62\")   Wt 70.3 kg (155 lb)   SpO2 98%   BMI 28.35 kg/m²   Pulmonary:      Effort: Pulmonary effort is normal.      Breath sounds: Normal breath sounds. No stridor. No wheezing. No rhonchi. No rales.   Cardiovascular:      PMI at left midclavicular line. Normal rate. Regular rhythm. Normal S1. Normal S2.      Murmurs: There is no murmur.      No gallop. No click. No rub.   Pulses:     Intact distal pulses.   Edema:     Peripheral edema absent.           Lab Review  Lab Results   Component Value Date     12/07/2022    K 4.6 12/07/2022    CL 99 12/07/2022    BUN 16 12/07/2022    CREATININE 1.01 (H) 12/07/2022    GLUCOSE 117 (H) 12/07/2022    CALCIUM 9.5 12/07/2022     ALT 7 12/07/2022    ALKPHOS 60 12/07/2022    LABIL2 1.5 04/28/2016     Lab Results   Component Value Date    CKTOTAL 32 12/07/2022     Lab Results   Component Value Date    WBC 9.55 12/07/2022    HGB 12.9 12/07/2022    HCT 37.8 12/07/2022     12/07/2022     No results found for: INR  No results found for: MG  Lab Results   Component Value Date    TSH 2.080 12/07/2022     No results found for: BNP  Lab Results   Component Value Date    CHLPL 184 04/28/2016    CHOL 162 12/07/2022    TRIG 163 (H) 12/07/2022    HDL 45 12/07/2022    VLDL 28 12/07/2022    LDLHDL 1.88 12/07/2022     Lab Results   Component Value Date    LDL 89 12/07/2022    LDL 91 06/17/2022     Lab Results   Component Value Date    PROBNP 270.0 01/28/2022       Procedures       I personally viewed and interpreted the patient's LAB data         Assessment:     1. Mixed hyperlipidemia    2. Type 2 diabetes mellitus without complication, without long-term current use of insulin (HCC)    3. Primary hypertension    4. Hypothyroidism, unspecified type          Plan:     Labs reviewed and discussed with the patient  Lipid panel shows LDL 89 HDL 45.  She was advised to continue Lipitor healthy lifestyle emphasized.    Type 2 diabetes mellitus, appears to be fairly well controlled she will continue Tradjenta and metformin    Blood pressure is mildly elevated she was advised to check her blood pressure closely, she will call with instructions depending on blood pressure readings.  She will continue Lopresso, losartan and r [place].    Hypothyroidism  Synthroid dose was decreased to 75 mcg daily.    Follow-up scheduled    No follow-ups on file.

## 2023-04-04 ENCOUNTER — OFFICE VISIT (OUTPATIENT)
Dept: CARDIOLOGY | Facility: CLINIC | Age: 68
End: 2023-04-04
Payer: MEDICARE

## 2023-04-04 VITALS
WEIGHT: 157.2 LBS | HEIGHT: 62 IN | OXYGEN SATURATION: 96 % | HEART RATE: 67 BPM | SYSTOLIC BLOOD PRESSURE: 129 MMHG | BODY MASS INDEX: 28.93 KG/M2 | DIASTOLIC BLOOD PRESSURE: 83 MMHG

## 2023-04-04 DIAGNOSIS — J43.8 OTHER EMPHYSEMA: ICD-10-CM

## 2023-04-04 DIAGNOSIS — E78.2 MIXED HYPERLIPIDEMIA: Primary | ICD-10-CM

## 2023-04-04 DIAGNOSIS — I10 ESSENTIAL HYPERTENSION: ICD-10-CM

## 2023-04-04 DIAGNOSIS — E11.9 TYPE 2 DIABETES MELLITUS WITHOUT COMPLICATION, WITHOUT LONG-TERM CURRENT USE OF INSULIN: ICD-10-CM

## 2023-04-04 DIAGNOSIS — I10 PRIMARY HYPERTENSION: ICD-10-CM

## 2023-04-04 DIAGNOSIS — M81.0 POST-MENOPAUSAL OSTEOPOROSIS: ICD-10-CM

## 2023-04-04 DIAGNOSIS — E03.8 OTHER SPECIFIED HYPOTHYROIDISM: ICD-10-CM

## 2023-04-04 PROCEDURE — 99214 OFFICE O/P EST MOD 30 MIN: CPT | Performed by: INTERNAL MEDICINE

## 2023-04-04 PROCEDURE — 3079F DIAST BP 80-89 MM HG: CPT | Performed by: INTERNAL MEDICINE

## 2023-04-04 PROCEDURE — 3074F SYST BP LT 130 MM HG: CPT | Performed by: INTERNAL MEDICINE

## 2023-04-04 RX ORDER — MELATONIN
1000 DAILY
Qty: 90 TABLET | Refills: 1 | OUTPATIENT
Start: 2023-04-04

## 2023-04-04 RX ORDER — LOSARTAN POTASSIUM 50 MG/1
100 TABLET ORAL DAILY
Qty: 180 TABLET | Refills: 2 | Status: SHIPPED | OUTPATIENT
Start: 2023-04-04

## 2023-04-04 RX ORDER — CETIRIZINE HYDROCHLORIDE 5 MG/1
5 TABLET ORAL DAILY
Qty: 30 TABLET | Refills: 1 | Status: CANCELLED | OUTPATIENT
Start: 2023-04-04

## 2023-04-04 RX ORDER — MONTELUKAST SODIUM 10 MG/1
10 TABLET ORAL
Qty: 90 TABLET | Refills: 5 | Status: CANCELLED | OUTPATIENT
Start: 2023-04-04

## 2023-04-04 RX ORDER — CHLORTHALIDONE 25 MG/1
12.5 TABLET ORAL DAILY
Qty: 45 TABLET | Refills: 1 | Status: SHIPPED | OUTPATIENT
Start: 2023-04-04

## 2023-04-04 RX ORDER — LEVOTHYROXINE SODIUM 0.07 MG/1
75 TABLET ORAL DAILY
Qty: 90 TABLET | Refills: 1 | Status: SHIPPED | OUTPATIENT
Start: 2023-04-04

## 2023-04-04 RX ORDER — ALBUTEROL SULFATE 90 UG/1
2 AEROSOL, METERED RESPIRATORY (INHALATION) EVERY 4 HOURS PRN
Qty: 18 G | Refills: 5 | Status: SHIPPED | OUTPATIENT
Start: 2023-04-04

## 2023-04-04 RX ORDER — CETIRIZINE HYDROCHLORIDE 10 MG/1
5 TABLET ORAL DAILY
Qty: 30 TABLET | Refills: 0 | Status: SHIPPED | OUTPATIENT
Start: 2023-04-04

## 2023-04-04 RX ORDER — FLUTICASONE FUROATE AND VILANTEROL 100; 25 UG/1; UG/1
1 POWDER RESPIRATORY (INHALATION)
Qty: 60 EACH | Refills: 12 | Status: SHIPPED | OUTPATIENT
Start: 2023-04-04

## 2023-04-04 RX ORDER — ATORVASTATIN CALCIUM 10 MG/1
10 TABLET, FILM COATED ORAL DAILY
Qty: 90 TABLET | Refills: 3 | Status: SHIPPED | OUTPATIENT
Start: 2023-04-04

## 2023-04-04 RX ORDER — ONDANSETRON 4 MG/1
4 TABLET, FILM COATED ORAL EVERY 8 HOURS PRN
Qty: 14 TABLET | Refills: 1 | Status: SHIPPED | OUTPATIENT
Start: 2023-04-04

## 2023-04-04 NOTE — PROGRESS NOTES
subjective     Chief Complaint   Patient presents with   • Hyperlipidemia     Pt here 3 month follow up    • Med Refill     Pt requesting med refill      History of Present Illness  -67 years old white female who is here for follow-up     Problem list    Hypertension  Hyperlipidemia  Hypothyroidism  Diabetes mellitus type 2  Chronic bronchitis  Multiple allergies    Patient states that she is doing very well she is taking her medications regularly.  She denies any chest pain palpitations or shortness of breath    She complains of multiple allergies with pollens and has been having a lot of difficulties.  She is taking Singulair and Zyrtec.    Hypertension is very well controlled with current medications.  Hyperlipidemia, she is tolerating Lipitor very well  Diabetes mellitus Home monitoring sugar is good    We will arrange lab work    Past Surgical History:   Procedure Laterality Date   • CATARACT EXTRACTION, BILATERAL Bilateral     June and july 2020   • CHOLECYSTECTOMY     • COLONOSCOPY N/A 6/7/2022    Procedure: COLONOSCOPY FOR SCREENING;  Surgeon: Loren Leigh MD;  Location: [facility];  Service: Gastroenterology;  Laterality: N/A;   • COLONOSCOPY W/ BIOPSIES  2012   • HYSTERECTOMY     • VARICOSE VEIN SURGERY Right      Family History   Problem Relation Age of Onset   • Thyroid cancer Mother    • Aneurysm Mother    • Hypertension Mother    • Diabetes Mother    • Arthritis Mother    • Heart attack Father    • Heart disease Father    • Diabetes Brother    • Kidney failure Brother    • Hypertension Brother    • Diabetes Brother    • Hypertension Brother    • Breast cancer Maternal Aunt      Past Medical History:   Diagnosis Date   • Arthritis    • B12 deficiency    • Bronchitis, acute    • Chronic pain syndrome    • COPD (chronic obstructive pulmonary disease)    • Diabetes mellitus    • Disease of thyroid gland    • Elevated cholesterol    • Hyperlipidemia    • Hypertension      Patient Active Problem  List   Diagnosis   • Acute bronchitis   • B12 deficiency*   • Chronic pain syndrome*   • Hypertension*   • Hyperlipidemia*   • Diabetes mellitus*   • Hypothyroidism*   • Vitamin D deficiency*   • Right ear pain   • Sebaceous cyst   • Chronic bronchitis   • Age-related osteoporosis without current pathological fracture   • Tobacco use   • Environmental allergies   • Edema leg   • Chronic systolic heart failure   • Closed fracture of multiple ribs of left side   • Chest wall pain   • Encounter for screening for malignant neoplasm of colon   • Left foot pain   • Hearing loss of right ear       Social History     Tobacco Use   • Smoking status: Every Day     Packs/day: 0.50     Years: 10.00     Pack years: 5.00     Types: Cigarettes   • Smokeless tobacco: Never   Vaping Use   • Vaping Use: Never used   Substance Use Topics   • Alcohol use: No   • Drug use: No       Allergies   Allergen Reactions   • Avelox [Moxifloxacin] Hives     Burning itcing whelps allover       Current Outpatient Medications on File Prior to Visit   Medication Sig   • albuterol sulfate  (90 Base) MCG/ACT inhaler INHALE TWO PUFFS BY MOUTH EVERY 4 HOURS AS NEEDED FOR WHEEZING   • atorvastatin (LIPITOR) 10 MG tablet Take 1 tablet by mouth Daily. (Patient taking differently: Take 1 tablet by mouth Daily. Takes every other day due to leg cramps)   • cetirizine (zyrTEC) 5 MG tablet Take 1 tablet by mouth Daily.   • chlorthalidone (HYGROTON) 25 MG tablet Take 0.5 tablets by mouth Daily.   • cholecalciferol (Vitamin D, Cholecalciferol,) 25 MCG (1000 UT) tablet Take 1 tablet by mouth Daily.   • Diclofenac Sodium (VOLTAREN) 1 % gel gel Apply 4 g topically to the appropriate area as directed 4 (Four) Times a Day As Needed.   • Fluticasone Furoate-Vilanterol (Breo Ellipta) 100-25 MCG/ACT aerosol powder  Inhale 1 puff Daily.   • glucose blood test strip 1 each by Other route Daily.   • hydroxychloroquine (PLAQUENIL) 200 MG tablet Take 1 tablet by mouth  "Daily.   • levothyroxine (Synthroid) 75 MCG tablet Take 1 tablet by mouth Daily.   • linagliptin (TRADJENTA) 5 MG tablet tablet Take 1 tablet by mouth Daily.   • losartan (COZAAR) 50 MG tablet Take 2 tablets by mouth Daily. for blood pressure   • metFORMIN (GLUCOPHAGE) 1000 MG tablet Take 1 tablet by mouth 2 (Two) Times a Day With Meals.   • metoprolol tartrate (LOPRESSOR) 25 MG tablet Take 1 tablet by mouth 2 (Two) Times a Day.   • montelukast (SINGULAIR) 10 MG tablet TAKE ONE TABLET BY MOUTH EVERY NIGHT AT BEDTIME   • ondansetron (Zofran) 4 MG tablet Take 1 tablet by mouth Every 8 (Eight) Hours As Needed for Nausea or Vomiting.   • ONE TOUCH LANCETS [facility] USES TWICE A DAY TO CHECK BLOOD SUGAR   • Restasis 0.05 % ophthalmic emulsion Administer 1 drop into the left eye 2 (Two) Times a Day.   • vitamin B-12 (CYANOCOBALAMIN) 1000 MCG tablet Take 1 tablet by mouth Daily.     No current facility-administered medications on file prior to visit.         The following portions of the patient's history were reviewed and updated as appropriate: allergies, current medications, past family history, past medical history, past social history, past surgical history and problem list.    Review of Systems   Constitutional: Negative.   HENT: Negative.  Negative for congestion.    Eyes: Negative.    Cardiovascular: Negative.  Negative for chest pain, cyanosis, dyspnea on exertion, irregular heartbeat, leg swelling, near-syncope, orthopnea, palpitations, paroxysmal nocturnal dyspnea and syncope.   Respiratory: Negative.  Negative for shortness of breath.    Hematologic/Lymphatic: Negative.    Musculoskeletal: Negative.    Gastrointestinal: Negative.    Neurological: Negative.  Negative for headaches.          Objective:     /83 (BP Location: Left arm, Patient Position: Sitting, Cuff Size: Adult)   Pulse 67   Ht 157.5 cm (62\")   Wt 71.3 kg (157 lb 3.2 oz)   SpO2 96%   BMI 28.75 kg/m²   Pulmonary:      Effort: Pulmonary effort " is normal.      Breath sounds: Normal breath sounds. No stridor. No wheezing. No rhonchi. No rales.   Cardiovascular:      PMI at left midclavicular line. Normal rate. Regular rhythm. Normal S1. Normal S2.      Murmurs: There is no murmur.      No gallop. No click. No rub.   Pulses:     Intact distal pulses.   Edema:     Peripheral edema absent.           Lab Review  Lab Results   Component Value Date     12/07/2022    K 4.6 12/07/2022    CL 99 12/07/2022    BUN 16 12/07/2022    CREATININE 1.01 (H) 12/07/2022    GLUCOSE 117 (H) 12/07/2022    CALCIUM 9.5 12/07/2022    ALT 7 12/07/2022    ALKPHOS 60 12/07/2022    LABIL2 1.5 04/28/2016     Lab Results   Component Value Date    CKTOTAL 32 12/07/2022     Lab Results   Component Value Date    WBC 9.55 12/07/2022    HGB 12.9 12/07/2022    HCT 37.8 12/07/2022     12/07/2022     No results found for: INR  No results found for: MG  Lab Results   Component Value Date    TSH 2.080 12/07/2022     No results found for: BNP  Lab Results   Component Value Date    CHLPL 184 04/28/2016    CHOL 162 12/07/2022    TRIG 163 (H) 12/07/2022    HDL 45 12/07/2022    VLDL 28 12/07/2022    LDLHDL 1.88 12/07/2022     Lab Results   Component Value Date    LDL 89 12/07/2022    LDL 91 06/17/2022     Lab Results   Component Value Date    PROBNP 270.0 01/28/2022     CARDIAC LABS:          Procedures       I personally viewed and interpreted the patient's LAB data         Assessment:     1. Type 2 diabetes mellitus without complication, without long-term current use of insulin    2. Other specified hypothyroidism    3. Essential hypertension    4. Other emphysema          Plan:     Type 2 diabetes mellitus: Well-controlled on current medications she was advised to continue Tradjenta, metformin  Weight loss and healthy lifestyle and dietary recommendations discussed.    Hypothyroidism  Lab work scheduled she will continue Synthroid 75 mcg daily    Hypertension blood pressure is well  controlled  She will continue losartan 100 mg daily, Hygroton 12.5 mg daily    COPD and multiple drug allergies.  She will continue albuterol HFA along with Singulair, Breo Ellipta    Because of multiple allergies Zyrtec was also given she will take Zyrtec 5 mg daily.    Lab work scheduled      No follow-ups on file.

## 2023-04-20 ENCOUNTER — HOSPITAL ENCOUNTER (OUTPATIENT)
Dept: BONE DENSITY | Facility: HOSPITAL | Age: 68
Discharge: HOME | End: 2023-04-20
Admitting: INTERNAL MEDICINE
Payer: MEDICARE

## 2023-04-20 ENCOUNTER — TELEPHONE (OUTPATIENT)
Dept: CARDIOLOGY | Facility: CLINIC | Age: 68
End: 2023-04-20
Payer: MEDICARE

## 2023-04-20 DIAGNOSIS — M81.0 POST-MENOPAUSAL OSTEOPOROSIS: ICD-10-CM

## 2023-04-20 PROCEDURE — 77080 DXA BONE DENSITY AXIAL: CPT

## 2023-04-20 PROCEDURE — 77080 DXA BONE DENSITY AXIAL: CPT | Performed by: RADIOLOGY

## 2023-04-20 RX ORDER — ALENDRONATE SODIUM 70 MG/1
70 TABLET ORAL
Qty: 12 TABLET | Refills: 3 | Status: SHIPPED | OUTPATIENT
Start: 2023-04-20

## 2023-06-07 ENCOUNTER — LAB (OUTPATIENT)
Dept: LAB | Facility: HOSPITAL | Age: 68
End: 2023-06-07
Payer: MEDICARE

## 2023-06-07 DIAGNOSIS — E11.9 TYPE 2 DIABETES MELLITUS WITHOUT COMPLICATION, WITHOUT LONG-TERM CURRENT USE OF INSULIN: Primary | ICD-10-CM

## 2023-06-07 DIAGNOSIS — E55.9 VITAMIN D DEFICIENCY: ICD-10-CM

## 2023-06-07 DIAGNOSIS — E78.2 MIXED HYPERLIPIDEMIA: ICD-10-CM

## 2023-06-07 DIAGNOSIS — E11.9 TYPE 2 DIABETES MELLITUS WITHOUT COMPLICATION, WITHOUT LONG-TERM CURRENT USE OF INSULIN: ICD-10-CM

## 2023-06-07 DIAGNOSIS — I10 ESSENTIAL HYPERTENSION: ICD-10-CM

## 2023-06-07 DIAGNOSIS — E03.9 HYPOTHYROIDISM, UNSPECIFIED TYPE: ICD-10-CM

## 2023-06-07 LAB
25(OH)D3 SERPL-MCNC: 35.4 NG/ML (ref 30–100)
ALBUMIN SERPL-MCNC: 3.9 G/DL (ref 3.5–5.2)
ALBUMIN UR-MCNC: <1.2 MG/DL
ALBUMIN/GLOB SERPL: 1.4 G/DL
ALP SERPL-CCNC: 51 U/L (ref 39–117)
ALT SERPL W P-5'-P-CCNC: 11 U/L (ref 1–33)
ANION GAP SERPL CALCULATED.3IONS-SCNC: 9.6 MMOL/L (ref 5–15)
AST SERPL-CCNC: 9 U/L (ref 1–32)
BASOPHILS # BLD AUTO: 0.06 10*3/MM3 (ref 0–0.2)
BASOPHILS NFR BLD AUTO: 0.7 % (ref 0–1.5)
BILIRUB SERPL-MCNC: 0.3 MG/DL (ref 0–1.2)
BUN SERPL-MCNC: 16 MG/DL (ref 8–23)
BUN/CREAT SERPL: 16 (ref 7–25)
CALCIUM SPEC-SCNC: 9.2 MG/DL (ref 8.6–10.5)
CHLORIDE SERPL-SCNC: 100 MMOL/L (ref 98–107)
CHOLEST SERPL-MCNC: 143 MG/DL (ref 0–200)
CK SERPL-CCNC: 35 U/L (ref 20–180)
CO2 SERPL-SCNC: 26.4 MMOL/L (ref 22–29)
CREAT SERPL-MCNC: 1 MG/DL (ref 0.57–1)
DEPRECATED RDW RBC AUTO: 42.4 FL (ref 37–54)
EGFRCR SERPLBLD CKD-EPI 2021: 61.1 ML/MIN/1.73
EOSINOPHIL # BLD AUTO: 0.16 10*3/MM3 (ref 0–0.4)
EOSINOPHIL NFR BLD AUTO: 2 % (ref 0.3–6.2)
ERYTHROCYTE [DISTWIDTH] IN BLOOD BY AUTOMATED COUNT: 12.7 % (ref 12.3–15.4)
GLOBULIN UR ELPH-MCNC: 2.7 GM/DL
GLUCOSE SERPL-MCNC: 115 MG/DL (ref 65–99)
HBA1C MFR BLD: 6 % (ref 4.8–5.6)
HCT VFR BLD AUTO: 37 % (ref 34–46.6)
HDLC SERPL-MCNC: 44 MG/DL (ref 40–60)
HGB BLD-MCNC: 12.3 G/DL (ref 12–15.9)
IMM GRANULOCYTES # BLD AUTO: 0.02 10*3/MM3 (ref 0–0.05)
IMM GRANULOCYTES NFR BLD AUTO: 0.2 % (ref 0–0.5)
LDLC SERPL CALC-MCNC: 77 MG/DL (ref 0–100)
LDLC/HDLC SERPL: 1.7 {RATIO}
LYMPHOCYTES # BLD AUTO: 1.43 10*3/MM3 (ref 0.7–3.1)
LYMPHOCYTES NFR BLD AUTO: 17.5 % (ref 19.6–45.3)
MCH RBC QN AUTO: 30.5 PG (ref 26.6–33)
MCHC RBC AUTO-ENTMCNC: 33.2 G/DL (ref 31.5–35.7)
MCV RBC AUTO: 91.8 FL (ref 79–97)
MONOCYTES # BLD AUTO: 0.58 10*3/MM3 (ref 0.1–0.9)
MONOCYTES NFR BLD AUTO: 7.1 % (ref 5–12)
NEUTROPHILS NFR BLD AUTO: 5.92 10*3/MM3 (ref 1.7–7)
NEUTROPHILS NFR BLD AUTO: 72.5 % (ref 42.7–76)
NRBC BLD AUTO-RTO: 0 /100 WBC (ref 0–0.2)
PLATELET # BLD AUTO: 317 10*3/MM3 (ref 140–450)
PMV BLD AUTO: 10.6 FL (ref 6–12)
POTASSIUM SERPL-SCNC: 4 MMOL/L (ref 3.5–5.2)
PROT SERPL-MCNC: 6.6 G/DL (ref 6–8.5)
RBC # BLD AUTO: 4.03 10*6/MM3 (ref 3.77–5.28)
SODIUM SERPL-SCNC: 136 MMOL/L (ref 136–145)
T4 FREE SERPL-MCNC: 1.84 NG/DL (ref 0.93–1.7)
TRIGL SERPL-MCNC: 120 MG/DL (ref 0–150)
TSH SERPL DL<=0.05 MIU/L-ACNC: 2.83 UIU/ML (ref 0.27–4.2)
VLDLC SERPL-MCNC: 22 MG/DL (ref 5–40)
WBC NRBC COR # BLD: 8.17 10*3/MM3 (ref 3.4–10.8)

## 2023-06-07 PROCEDURE — 84443 ASSAY THYROID STIM HORMONE: CPT

## 2023-06-07 PROCEDURE — 82306 VITAMIN D 25 HYDROXY: CPT

## 2023-06-07 PROCEDURE — 80053 COMPREHEN METABOLIC PANEL: CPT

## 2023-06-07 PROCEDURE — 84439 ASSAY OF FREE THYROXINE: CPT

## 2023-06-07 PROCEDURE — 82550 ASSAY OF CK (CPK): CPT

## 2023-06-07 PROCEDURE — 83036 HEMOGLOBIN GLYCOSYLATED A1C: CPT

## 2023-06-07 PROCEDURE — 36415 COLL VENOUS BLD VENIPUNCTURE: CPT

## 2023-06-07 PROCEDURE — 85025 COMPLETE CBC W/AUTO DIFF WBC: CPT

## 2023-06-07 PROCEDURE — 82043 UR ALBUMIN QUANTITATIVE: CPT

## 2023-06-07 PROCEDURE — 80061 LIPID PANEL: CPT

## 2023-07-11 PROBLEM — I51.9 DIASTOLIC DYSFUNCTION, LEFT VENTRICLE: Status: ACTIVE | Noted: 2023-07-11

## 2023-10-05 DIAGNOSIS — E03.8 OTHER SPECIFIED HYPOTHYROIDISM: ICD-10-CM

## 2023-10-05 DIAGNOSIS — J43.8 OTHER EMPHYSEMA: ICD-10-CM

## 2023-10-05 DIAGNOSIS — I10 ESSENTIAL HYPERTENSION: ICD-10-CM

## 2023-10-09 ENCOUNTER — OFFICE VISIT (OUTPATIENT)
Dept: CARDIOLOGY | Facility: CLINIC | Age: 68
End: 2023-10-09
Payer: MEDICARE

## 2023-10-09 VITALS
BODY MASS INDEX: 27.71 KG/M2 | SYSTOLIC BLOOD PRESSURE: 147 MMHG | WEIGHT: 150.6 LBS | OXYGEN SATURATION: 95 % | HEART RATE: 66 BPM | DIASTOLIC BLOOD PRESSURE: 82 MMHG | HEIGHT: 62 IN

## 2023-10-09 DIAGNOSIS — I10 PRIMARY HYPERTENSION: ICD-10-CM

## 2023-10-09 DIAGNOSIS — J43.8 OTHER EMPHYSEMA: ICD-10-CM

## 2023-10-09 DIAGNOSIS — Z91.09 ENVIRONMENTAL ALLERGIES: ICD-10-CM

## 2023-10-09 DIAGNOSIS — Z72.0 TOBACCO USE: ICD-10-CM

## 2023-10-09 DIAGNOSIS — E78.2 MIXED HYPERLIPIDEMIA: Primary | ICD-10-CM

## 2023-10-09 DIAGNOSIS — E03.8 OTHER SPECIFIED HYPOTHYROIDISM: ICD-10-CM

## 2023-10-09 DIAGNOSIS — E11.9 TYPE 2 DIABETES MELLITUS WITHOUT COMPLICATION, WITHOUT LONG-TERM CURRENT USE OF INSULIN: ICD-10-CM

## 2023-10-09 DIAGNOSIS — I10 ESSENTIAL HYPERTENSION: ICD-10-CM

## 2023-10-09 RX ORDER — CHLORTHALIDONE 25 MG/1
25 TABLET ORAL DAILY
Qty: 90 TABLET | Refills: 1 | Status: SHIPPED | OUTPATIENT
Start: 2023-10-09

## 2023-10-09 RX ORDER — LOSARTAN POTASSIUM 50 MG/1
100 TABLET ORAL DAILY
Qty: 180 TABLET | Refills: 2 | Status: SHIPPED | OUTPATIENT
Start: 2023-10-09

## 2023-10-09 NOTE — PROGRESS NOTES
subjective     Chief Complaint   Patient presents with    Hyperlipidemia     Follow up, pt requesting Influenza vaccine.     Med Refill     pended       Problems Addressed This Visit  1. Mixed hyperlipidemia    2. Other specified hypothyroidism    3. Essential hypertension    4. Other emphysema    5. Primary hypertension    6. Type 2 diabetes mellitus without complication, without long-term current use of insulin    7. Tobacco use    8. Environmental allergies        History of Present Illness  Leonarda is 68 years old white female who is here for follow-up.  She has history of hypertension, hyperlipidemia, hypothyroidism, diabetes mellitus and ongoing tobacco use.    Blood pressure is running little bit high.  She is taking Lopressor 25 twice daily, Cozaar 100 mg daily and Hygroton 12.5 mg daily.  Hygroton dose will be increased.    Hyperlipidemia  She is taking Lipitor and is trying to follow diet.    Hypothyroidism  Clinically euthyroid on Synthroid replacement therapy.    Diabetes mellitus  She is taking metformin, Tradjenta.  She is also trying to follow diet.  Environmental allergies better with current medications.  Unfortunately ongoing tobacco use.        Past Surgical History:   Procedure Laterality Date    CATARACT EXTRACTION, BILATERAL Bilateral     June and july 2020    CHOLECYSTECTOMY      COLONOSCOPY N/A 6/7/2022    Procedure: COLONOSCOPY FOR SCREENING;  Surgeon: Loren Leigh MD;  Location: [facility];  Service: Gastroenterology;  Laterality: N/A;    COLONOSCOPY W/ BIOPSIES  2012    HYSTERECTOMY      VARICOSE VEIN SURGERY Right      Family History   Problem Relation Age of Onset    Thyroid cancer Mother     Aneurysm Mother     Hypertension Mother     Diabetes Mother     Arthritis Mother     Heart attack Father     Heart disease Father     Diabetes Brother     Kidney failure Brother     Hypertension Brother     Diabetes Brother     Hypertension Brother     Breast cancer Maternal Aunt      Past  Medical History:   Diagnosis Date    Arthritis     B12 deficiency     Bronchitis, acute     Chronic pain syndrome     COPD (chronic obstructive pulmonary disease)     Diabetes mellitus     Disease of thyroid gland     Elevated cholesterol     Hyperlipidemia     Hypertension      Patient Active Problem List   Diagnosis    Acute bronchitis    B12 deficiency*    Chronic pain syndrome*    Hypertension*    Hyperlipidemia*    Diabetes mellitus*    Hypothyroidism*    Vitamin D deficiency*    Right ear pain    Sebaceous cyst    Chronic bronchitis    Post-menopausal osteoporosis    Tobacco use    Environmental allergies    Edema leg    Closed fracture of multiple ribs of left side    Chest wall pain    Encounter for screening for malignant neoplasm of colon    Left foot pain    Hearing loss of right ear    Diastolic dysfunction, left ventricle       Social History     Tobacco Use    Smoking status: Every Day     Packs/day: 0.50     Years: 10.00     Additional pack years: 0.00     Total pack years: 5.00     Types: Cigarettes    Smokeless tobacco: Never   Vaping Use    Vaping Use: Never used   Substance Use Topics    Alcohol use: No    Drug use: No       Allergies   Allergen Reactions    Avelox [Moxifloxacin] Hives     Burning itcing whelps allover       Current Outpatient Medications on File Prior to Visit   Medication Sig    albuterol sulfate  (90 Base) MCG/ACT inhaler Inhale 2 puffs Every 4 (Four) Hours As Needed for Wheezing.    atorvastatin (LIPITOR) 10 MG tablet Take 1 tablet by mouth Daily.    cetirizine (zyrTEC) 10 MG tablet Take 0.5 tablets by mouth Daily.    cholecalciferol (Vitamin D, Cholecalciferol,) 25 MCG (1000 UT) tablet Take 1 tablet by mouth Daily.    Diclofenac Sodium (VOLTAREN) 1 % gel gel Apply 4 g topically to the appropriate area as directed 4 (Four) Times a Day As Needed.    fluticasone (FLONASE) 50 MCG/ACT nasal spray 2 sprays into the nostril(s) as directed by provider Daily.    Fluticasone  Furoate-Vilanterol (Breo Ellipta) 100-25 MCG/ACT aerosol powder  Inhale 1 puff Daily.    glucose blood test strip 1 each by Other route Daily.    hydroxychloroquine (PLAQUENIL) 200 MG tablet Take 1 tablet by mouth Daily.    levothyroxine (Synthroid) 50 MCG tablet Take 1 tablet by mouth Daily. (Patient taking differently: Take 1.5 tablets by mouth Daily.)    linagliptin (TRADJENTA) 5 MG tablet tablet Take 1 tablet by mouth Daily.    metFORMIN (GLUCOPHAGE) 1000 MG tablet Take 1 tablet by mouth 2 (Two) Times a Day With Meals.    montelukast (SINGULAIR) 10 MG tablet TAKE ONE TABLET BY MOUTH EVERY NIGHT AT BEDTIME    ondansetron (Zofran) 4 MG tablet Take 1 tablet by mouth Every 8 (Eight) Hours As Needed for Nausea or Vomiting.    ONE TOUCH LANCETS misc USES TWICE A DAY TO CHECK BLOOD SUGAR    Restasis 0.05 % ophthalmic emulsion Administer 1 drop into the left eye 2 (Two) Times a Day.    vitamin B-12 (CYANOCOBALAMIN) 1000 MCG tablet Take 1 tablet by mouth Daily.     No current facility-administered medications on file prior to visit.         The following portions of the patient's history were reviewed and updated as appropriate: allergies, current medications, past family history, past medical history, past social history, past surgical history and problem list.    Review of Systems   Constitutional: Negative.   HENT: Negative.  Negative for congestion.    Eyes: Negative.    Cardiovascular: Negative.  Negative for chest pain, cyanosis, dyspnea on exertion, irregular heartbeat, leg swelling, near-syncope, orthopnea, palpitations, paroxysmal nocturnal dyspnea and syncope.   Respiratory: Negative.  Negative for shortness of breath.    Hematologic/Lymphatic: Negative.    Musculoskeletal:  Positive for arthritis.   Gastrointestinal: Negative.    Neurological: Negative.  Negative for headaches.   Allergic/Immunologic: Positive for environmental allergies.          Objective:     /82 (BP Location: Right arm, Patient  "Position: Sitting, Cuff Size: Adult)   Pulse 66   Ht 157.5 cm (62\")   Wt 68.3 kg (150 lb 9.6 oz)   SpO2 95%   BMI 27.55 kg/mý   Pulmonary:      Effort: Pulmonary effort is normal.      Breath sounds: Normal breath sounds. No stridor. No wheezing. No rhonchi. No rales.   Cardiovascular:      PMI at left midclavicular line. Normal rate. Regular rhythm. Normal S1. Normal S2.       Murmurs: There is no murmur.      No gallop.  No click. No rub.   Pulses:     Intact distal pulses.   Edema:     Peripheral edema absent.           Lab Review  Lab Results   Component Value Date     06/07/2023    K 4.0 06/07/2023     06/07/2023    BUN 16 06/07/2023    CREATININE 1.00 06/07/2023    GLUCOSE 115 (H) 06/07/2023    CALCIUM 9.2 06/07/2023    ALT 11 06/07/2023    ALKPHOS 51 06/07/2023    LABIL2 1.5 04/28/2016     Lab Results   Component Value Date    CKTOTAL 35 06/07/2023     Lab Results   Component Value Date    WBC 8.17 06/07/2023    HGB 12.3 06/07/2023    HCT 37.0 06/07/2023     06/07/2023     No results found for: \"INR\"  No results found for: \"MG\"  Lab Results   Component Value Date    TSH 2.830 06/07/2023     No results found for: \"BNP\"  Lab Results   Component Value Date    CHLPL 184 04/28/2016    CHOL 143 06/07/2023    TRIG 120 06/07/2023    HDL 44 06/07/2023    VLDL 22 06/07/2023    LDL 77 06/07/2023     Lab Results   Component Value Date    LDL 77 06/07/2023    LDL 89 12/07/2022     Lab Results   Component Value Date    PROBNP 270.0 01/28/2022   Lab work scheduled for next visit including CBC CMP lipid panel CK TSH A1c.            Procedures       I personally viewed and interpreted the patient's LAB data         Assessment:     1. Mixed hyperlipidemia    2. Other specified hypothyroidism    3. Essential hypertension    4. Other emphysema    5. Primary hypertension    6. Type 2 diabetes mellitus without complication, without long-term current use of insulin    7. Tobacco use    8. Environmental " allergies          Plan:     Hyperlipidemia  Last LDL was 77.  He was advised to continue to follow diet, healthy lifestyle emphasized she will continue statin therapy.  No drug side effects.    Hypothyroidism clinically euthyroid she will continue current dose of Synthroid.    Hypertension  Blood pressure is mildly elevated she will increase Hygroton 25 mg daily.    Diabetes mellitus dietary restrictions exercise program stressed she will continue Tradjenta and metformin.  Lab work scheduled for next visit.    Cessation of tobacco use stressed.  Environmental allergies are better she will continue current medications.  Follow-up scheduled        No follow-ups on file.

## 2023-10-20 ENCOUNTER — TRANSCRIBE ORDERS (OUTPATIENT)
Dept: ADMINISTRATIVE | Facility: HOSPITAL | Age: 68
End: 2023-10-20
Payer: MEDICARE

## 2023-10-20 DIAGNOSIS — Z12.31 VISIT FOR SCREENING MAMMOGRAM: Primary | ICD-10-CM

## 2023-11-10 ENCOUNTER — HOSPITAL ENCOUNTER (OUTPATIENT)
Dept: MAMMOGRAPHY | Facility: HOSPITAL | Age: 68
Discharge: HOME | End: 2023-11-10
Admitting: INTERNAL MEDICINE
Payer: MEDICARE

## 2023-11-10 DIAGNOSIS — Z12.31 VISIT FOR SCREENING MAMMOGRAM: ICD-10-CM

## 2023-11-10 PROCEDURE — 77063 BREAST TOMOSYNTHESIS BI: CPT

## 2023-11-10 PROCEDURE — 77067 SCR MAMMO BI INCL CAD: CPT

## 2023-12-01 ENCOUNTER — TELEPHONE (OUTPATIENT)
Dept: CARDIOLOGY | Facility: CLINIC | Age: 68
End: 2023-12-01
Payer: MEDICARE

## 2023-12-01 NOTE — TELEPHONE ENCOUNTER
Caller: Leonarda Diaz    Relationship: Self    Best call back number: [phone]    What is the best time to reach you: ANY    Who are you requesting to speak with (clinical staff, provider,  specific staff member): CLINICAL    What was the call regarding: PT WOULD LIKE TO KNOW IF THE OFFICE HAS GOTTEN THEIR FLU SHOTS IN YET FOR THE PATIENTS TO COME AND GET.

## 2023-12-04 ENCOUNTER — LAB (OUTPATIENT)
Dept: LAB | Facility: HOSPITAL | Age: 68
End: 2023-12-04
Payer: MEDICARE

## 2023-12-04 DIAGNOSIS — E78.2 MIXED HYPERLIPIDEMIA: ICD-10-CM

## 2023-12-04 DIAGNOSIS — E03.8 OTHER SPECIFIED HYPOTHYROIDISM: ICD-10-CM

## 2023-12-04 DIAGNOSIS — E11.9 TYPE 2 DIABETES MELLITUS WITHOUT COMPLICATION, WITHOUT LONG-TERM CURRENT USE OF INSULIN: ICD-10-CM

## 2023-12-04 LAB
ALBUMIN SERPL-MCNC: 4.2 G/DL (ref 3.5–5.2)
ALBUMIN UR-MCNC: <1.2 MG/DL
ALBUMIN/GLOB SERPL: 1.8 G/DL
ALP SERPL-CCNC: 61 U/L (ref 39–117)
ALT SERPL W P-5'-P-CCNC: 8 U/L (ref 1–33)
ANION GAP SERPL CALCULATED.3IONS-SCNC: 9 MMOL/L (ref 5–15)
AST SERPL-CCNC: 9 U/L (ref 1–32)
BASOPHILS # BLD AUTO: 0.07 10*3/MM3 (ref 0–0.2)
BASOPHILS NFR BLD AUTO: 0.7 % (ref 0–1.5)
BILIRUB SERPL-MCNC: 0.2 MG/DL (ref 0–1.2)
BUN SERPL-MCNC: 17 MG/DL (ref 8–23)
BUN/CREAT SERPL: 18.9 (ref 7–25)
CALCIUM SPEC-SCNC: 9.4 MG/DL (ref 8.6–10.5)
CHLORIDE SERPL-SCNC: 100 MMOL/L (ref 98–107)
CHOLEST SERPL-MCNC: 153 MG/DL (ref 0–200)
CK SERPL-CCNC: 32 U/L (ref 20–180)
CO2 SERPL-SCNC: 27 MMOL/L (ref 22–29)
CREAT SERPL-MCNC: 0.9 MG/DL (ref 0.57–1)
DEPRECATED RDW RBC AUTO: 48.2 FL (ref 37–54)
EGFRCR SERPLBLD CKD-EPI 2021: 69.3 ML/MIN/1.73
EOSINOPHIL # BLD AUTO: 0.17 10*3/MM3 (ref 0–0.4)
EOSINOPHIL NFR BLD AUTO: 1.7 % (ref 0.3–6.2)
ERYTHROCYTE [DISTWIDTH] IN BLOOD BY AUTOMATED COUNT: 13.8 % (ref 12.3–15.4)
GLOBULIN UR ELPH-MCNC: 2.3 GM/DL
GLUCOSE SERPL-MCNC: 112 MG/DL (ref 65–99)
HBA1C MFR BLD: 5.7 % (ref 4.8–5.6)
HCT VFR BLD AUTO: 39.9 % (ref 34–46.6)
HDLC SERPL-MCNC: 46 MG/DL (ref 40–60)
HGB BLD-MCNC: 12.7 G/DL (ref 12–15.9)
IMM GRANULOCYTES # BLD AUTO: 0.04 10*3/MM3 (ref 0–0.05)
IMM GRANULOCYTES NFR BLD AUTO: 0.4 % (ref 0–0.5)
LDLC SERPL CALC-MCNC: 78 MG/DL (ref 0–100)
LDLC/HDLC SERPL: 1.58 {RATIO}
LYMPHOCYTES # BLD AUTO: 1.81 10*3/MM3 (ref 0.7–3.1)
LYMPHOCYTES NFR BLD AUTO: 18.1 % (ref 19.6–45.3)
MCH RBC QN AUTO: 30.2 PG (ref 26.6–33)
MCHC RBC AUTO-ENTMCNC: 31.8 G/DL (ref 31.5–35.7)
MCV RBC AUTO: 95 FL (ref 79–97)
MONOCYTES # BLD AUTO: 0.64 10*3/MM3 (ref 0.1–0.9)
MONOCYTES NFR BLD AUTO: 6.4 % (ref 5–12)
NEUTROPHILS NFR BLD AUTO: 7.27 10*3/MM3 (ref 1.7–7)
NEUTROPHILS NFR BLD AUTO: 72.7 % (ref 42.7–76)
NRBC BLD AUTO-RTO: 0 /100 WBC (ref 0–0.2)
PLATELET # BLD AUTO: 375 10*3/MM3 (ref 140–450)
PMV BLD AUTO: 10.5 FL (ref 6–12)
POTASSIUM SERPL-SCNC: 4.7 MMOL/L (ref 3.5–5.2)
PROT SERPL-MCNC: 6.5 G/DL (ref 6–8.5)
RBC # BLD AUTO: 4.2 10*6/MM3 (ref 3.77–5.28)
SODIUM SERPL-SCNC: 136 MMOL/L (ref 136–145)
T4 FREE SERPL-MCNC: 1.6 NG/DL (ref 0.93–1.7)
TRIGL SERPL-MCNC: 171 MG/DL (ref 0–150)
TSH SERPL DL<=0.05 MIU/L-ACNC: 3.8 UIU/ML (ref 0.27–4.2)
VLDLC SERPL-MCNC: 29 MG/DL (ref 5–40)
WBC NRBC COR # BLD AUTO: 10 10*3/MM3 (ref 3.4–10.8)

## 2023-12-04 PROCEDURE — 84443 ASSAY THYROID STIM HORMONE: CPT

## 2023-12-04 PROCEDURE — 82043 UR ALBUMIN QUANTITATIVE: CPT

## 2023-12-04 PROCEDURE — 83036 HEMOGLOBIN GLYCOSYLATED A1C: CPT

## 2023-12-04 PROCEDURE — 36415 COLL VENOUS BLD VENIPUNCTURE: CPT

## 2023-12-04 PROCEDURE — 80053 COMPREHEN METABOLIC PANEL: CPT

## 2023-12-04 PROCEDURE — 80061 LIPID PANEL: CPT

## 2023-12-04 PROCEDURE — 82550 ASSAY OF CK (CPK): CPT

## 2023-12-04 PROCEDURE — 84439 ASSAY OF FREE THYROXINE: CPT

## 2023-12-04 PROCEDURE — 85025 COMPLETE CBC W/AUTO DIFF WBC: CPT

## 2024-01-08 ENCOUNTER — OFFICE VISIT (OUTPATIENT)
Dept: CARDIOLOGY | Facility: CLINIC | Age: 69
End: 2024-01-08
Payer: MEDICARE

## 2024-01-08 VITALS
OXYGEN SATURATION: 96 % | DIASTOLIC BLOOD PRESSURE: 79 MMHG | BODY MASS INDEX: 28.49 KG/M2 | WEIGHT: 154.8 LBS | HEART RATE: 67 BPM | HEIGHT: 62 IN | SYSTOLIC BLOOD PRESSURE: 146 MMHG

## 2024-01-08 DIAGNOSIS — E78.2 MIXED HYPERLIPIDEMIA: ICD-10-CM

## 2024-01-08 DIAGNOSIS — I10 PRIMARY HYPERTENSION: Primary | ICD-10-CM

## 2024-01-08 DIAGNOSIS — E11.9 TYPE 2 DIABETES MELLITUS WITHOUT COMPLICATION, WITHOUT LONG-TERM CURRENT USE OF INSULIN: ICD-10-CM

## 2024-01-08 DIAGNOSIS — Z72.0 TOBACCO USE: ICD-10-CM

## 2024-01-08 DIAGNOSIS — I10 ESSENTIAL HYPERTENSION: ICD-10-CM

## 2024-01-08 DIAGNOSIS — E03.8 OTHER SPECIFIED HYPOTHYROIDISM: ICD-10-CM

## 2024-01-08 DIAGNOSIS — J43.8 OTHER EMPHYSEMA: ICD-10-CM

## 2024-01-08 RX ORDER — MELATONIN
1000 DAILY
Qty: 90 TABLET | Refills: 1 | OUTPATIENT
Start: 2024-01-08

## 2024-01-08 RX ORDER — ATORVASTATIN CALCIUM 10 MG/1
10 TABLET, FILM COATED ORAL DAILY
Qty: 90 TABLET | Refills: 3 | Status: SHIPPED | OUTPATIENT
Start: 2024-01-08

## 2024-01-08 RX ORDER — CETIRIZINE HYDROCHLORIDE 10 MG/1
5 TABLET ORAL DAILY
Qty: 30 TABLET | Refills: 0 | Status: SHIPPED | OUTPATIENT
Start: 2024-01-08

## 2024-01-08 RX ORDER — CHLORTHALIDONE 25 MG/1
25 TABLET ORAL DAILY
Qty: 90 TABLET | Refills: 1 | Status: SHIPPED | OUTPATIENT
Start: 2024-01-08

## 2024-01-08 RX ORDER — LANCETS
EACH MISCELLANEOUS
Qty: 50 EACH | Refills: 2 | Status: SHIPPED | OUTPATIENT
Start: 2024-01-08

## 2024-01-08 RX ORDER — LEVOTHYROXINE SODIUM 0.05 MG/1
75 TABLET ORAL DAILY
Qty: 30 TABLET | Refills: 2 | Status: SHIPPED | OUTPATIENT
Start: 2024-01-08

## 2024-01-08 RX ORDER — AMLODIPINE BESYLATE 2.5 MG/1
2.5 TABLET ORAL DAILY
Qty: 30 TABLET | Refills: 11 | Status: SHIPPED | OUTPATIENT
Start: 2024-01-08

## 2024-01-08 RX ORDER — MONTELUKAST SODIUM 10 MG/1
10 TABLET ORAL
Qty: 90 TABLET | Refills: 5 | Status: SHIPPED | OUTPATIENT
Start: 2024-01-08

## 2024-01-08 RX ORDER — FLUTICASONE FUROATE AND VILANTEROL 100; 25 UG/1; UG/1
1 POWDER RESPIRATORY (INHALATION)
Qty: 60 EACH | Refills: 12 | Status: SHIPPED | OUTPATIENT
Start: 2024-01-08

## 2024-01-08 RX ORDER — ALBUTEROL SULFATE 90 UG/1
2 AEROSOL, METERED RESPIRATORY (INHALATION) EVERY 4 HOURS PRN
Qty: 18 G | Refills: 5 | Status: SHIPPED | OUTPATIENT
Start: 2024-01-08

## 2024-01-08 RX ORDER — ONDANSETRON 4 MG/1
4 TABLET, FILM COATED ORAL EVERY 8 HOURS PRN
Qty: 14 TABLET | Refills: 1 | Status: SHIPPED | OUTPATIENT
Start: 2024-01-08

## 2024-01-08 RX ORDER — LOSARTAN POTASSIUM 50 MG/1
100 TABLET ORAL DAILY
Qty: 180 TABLET | Refills: 2 | Status: SHIPPED | OUTPATIENT
Start: 2024-01-08

## 2024-01-08 NOTE — PROGRESS NOTES
subjective     Chief Complaint   Patient presents with    Hyperlipidemia     Follow up     Med Refill     pended    Results     labs       Problems Addressed This Visit  1. Primary hypertension    2. Type 2 diabetes mellitus without complication, without long-term current use of insulin    3. Other specified hypothyroidism    4. Essential hypertension    5. Other emphysema    6. Mixed hyperlipidemia    7. Tobacco use        History of Present Illness    Leonarda is 68 years old white female who is here for follow-up.  Her blood pressure is running slightly high otherwise she is doing very well.  She is taking her medications regularly without any drug side effects.    Hypertension  Blood pressures running around 144 systolic.  She is taking losartan 100 mg daily, Lopressor 25 twice daily and Hygroton 25 mg daily.    Diabetes mellitus  She is following diet and is taking.  She is on metformin and Tradjenta.    Hyperlipidemia  She is taking Lipitor 10 mg daily.  No drug side effects.    Hypothyroidism on Synthroid replacement therapy.  Clinically euthyroid on Synthroid 75 mcg daily.    COPD  She is taking albuterol and Breo Ellipta  Unfortunately she continues to smoke.    Past Surgical History:   Procedure Laterality Date    CATARACT EXTRACTION, BILATERAL Bilateral     June and july 2020    CHOLECYSTECTOMY      COLONOSCOPY N/A 6/7/2022    Procedure: COLONOSCOPY FOR SCREENING;  Surgeon: Loren Leigh MD;  Location: [facility];  Service: Gastroenterology;  Laterality: N/A;    COLONOSCOPY W/ BIOPSIES  2012    HYSTERECTOMY      VARICOSE VEIN SURGERY Right      Family History   Problem Relation Age of Onset    Thyroid cancer Mother     Aneurysm Mother     Hypertension Mother     Diabetes Mother     Arthritis Mother     Heart attack Father     Heart disease Father     Diabetes Brother     Kidney failure Brother     Hypertension Brother     Diabetes Brother     Hypertension Brother     Breast cancer Maternal Aunt       Past Medical History:   Diagnosis Date    Arthritis     B12 deficiency     Bronchitis, acute     Chronic pain syndrome     COPD (chronic obstructive pulmonary disease)     Diabetes mellitus     Disease of thyroid gland     Elevated cholesterol     Hyperlipidemia     Hypertension      Patient Active Problem List   Diagnosis    Acute bronchitis    B12 deficiency*    Chronic pain syndrome*    Hypertension*    Hyperlipidemia*    Diabetes mellitus*    Hypothyroidism*    Vitamin D deficiency*    Right ear pain    Sebaceous cyst    Chronic bronchitis    Post-menopausal osteoporosis    Tobacco use    Environmental allergies    Edema leg    Closed fracture of multiple ribs of left side    Chest wall pain    Encounter for screening for malignant neoplasm of colon    Left foot pain    Hearing loss of right ear    Diastolic dysfunction, left ventricle       Social History     Tobacco Use    Smoking status: Every Day     Packs/day: 0.50     Years: 10.00     Additional pack years: 0.00     Total pack years: 5.00     Types: Cigarettes    Smokeless tobacco: Never   Vaping Use    Vaping Use: Never used   Substance Use Topics    Alcohol use: No    Drug use: No       Allergies   Allergen Reactions    Avelox [Moxifloxacin] Hives     Burning itcing whelps allover       Current Outpatient Medications on File Prior to Visit   Medication Sig    Diclofenac Sodium (VOLTAREN) 1 % gel gel Apply 4 g topically to the appropriate area as directed 4 (Four) Times a Day As Needed.    fluticasone (FLONASE) 50 MCG/ACT nasal spray 2 sprays into the nostril(s) as directed by provider Daily.    hydroxychloroquine (PLAQUENIL) 200 MG tablet Take 1 tablet by mouth Daily.    Restasis 0.05 % ophthalmic emulsion Administer 1 drop into the left eye 2 (Two) Times a Day.    vitamin B-12 (CYANOCOBALAMIN) 1000 MCG tablet Take 1 tablet by mouth Daily.    [DISCONTINUED] albuterol sulfate  (90 Base) MCG/ACT inhaler Inhale 2 puffs Every 4 (Four) Hours As  Needed for Wheezing.    [DISCONTINUED] atorvastatin (LIPITOR) 10 MG tablet Take 1 tablet by mouth Daily.    [DISCONTINUED] cetirizine (zyrTEC) 10 MG tablet Take 0.5 tablets by mouth Daily.    [DISCONTINUED] chlorthalidone (HYGROTON) 25 MG tablet Take 1 tablet by mouth Daily.    [DISCONTINUED] cholecalciferol (Vitamin D, Cholecalciferol,) 25 MCG (1000 UT) tablet Take 1 tablet by mouth Daily.    [DISCONTINUED] Fluticasone Furoate-Vilanterol (Breo Ellipta) 100-25 MCG/ACT aerosol powder  Inhale 1 puff Daily.    [DISCONTINUED] glucose blood test strip 1 each by Other route Daily.    [DISCONTINUED] levothyroxine (Synthroid) 50 MCG tablet Take 1 tablet by mouth Daily. (Patient taking differently: Take 1.5 tablets by mouth Daily.)    [DISCONTINUED] linagliptin (TRADJENTA) 5 MG tablet tablet Take 1 tablet by mouth Daily.    [DISCONTINUED] losartan (COZAAR) 50 MG tablet Take 2 tablets by mouth Daily. for blood pressure    [DISCONTINUED] metFORMIN (GLUCOPHAGE) 1000 MG tablet Take 1 tablet by mouth 2 (Two) Times a Day With Meals.    [DISCONTINUED] metoprolol tartrate (LOPRESSOR) 25 MG tablet Take 1 tablet by mouth 2 (Two) Times a Day.    [DISCONTINUED] montelukast (SINGULAIR) 10 MG tablet TAKE ONE TABLET BY MOUTH EVERY NIGHT AT BEDTIME    [DISCONTINUED] ondansetron (Zofran) 4 MG tablet Take 1 tablet by mouth Every 8 (Eight) Hours As Needed for Nausea or Vomiting.    [DISCONTINUED] ONE TOUCH LANCETS [facility] USES TWICE A DAY TO CHECK BLOOD SUGAR     No current facility-administered medications on file prior to visit.         The following portions of the patient's history were reviewed and updated as appropriate: allergies, current medications, past family history, past medical history, past social history, past surgical history and problem list.    Review of Systems   Constitutional: Negative.   HENT: Negative.  Negative for congestion.    Eyes: Negative.    Cardiovascular: Negative.  Negative for chest pain, cyanosis, dyspnea on  "exertion, irregular heartbeat, leg swelling, near-syncope, orthopnea, palpitations, paroxysmal nocturnal dyspnea and syncope.   Respiratory: Negative.  Negative for shortness of breath.    Hematologic/Lymphatic: Negative.    Musculoskeletal: Negative.    Gastrointestinal: Negative.    Neurological: Negative.  Negative for headaches.          Objective:     /79 (BP Location: Left arm, Patient Position: Sitting, Cuff Size: Adult)   Pulse 67   Ht 157.5 cm (62\")   Wt 70.2 kg (154 lb 12.8 oz)   SpO2 96%   BMI 28.31 kg/m²   Pulmonary:      Effort: Pulmonary effort is normal.      Breath sounds: Normal breath sounds. No stridor. No wheezing. No rhonchi. No rales.   Cardiovascular:      PMI at left midclavicular line. Normal rate. Regular rhythm. Normal S1. Normal S2.       Murmurs: There is no murmur.      No gallop.  No click. No rub.   Pulses:     Intact distal pulses.   Edema:     Peripheral edema absent.           Lab Review  Lab Results   Component Value Date     12/04/2023    K 4.7 12/04/2023     12/04/2023    BUN 17 12/04/2023    CREATININE 0.90 12/04/2023    GLUCOSE 112 (H) 12/04/2023    CALCIUM 9.4 12/04/2023    ALT 8 12/04/2023    ALKPHOS 61 12/04/2023    LABIL2 1.5 04/28/2016     Lab Results   Component Value Date    CKTOTAL 32 12/04/2023     Lab Results   Component Value Date    WBC 10.00 12/04/2023    HGB 12.7 12/04/2023    HCT 39.9 12/04/2023     12/04/2023     No results found for: \"INR\"  No results found for: \"MG\"  Lab Results   Component Value Date    TSH 3.800 12/04/2023     No results found for: \"BNP\"  Lab Results   Component Value Date    CHLPL 184 04/28/2016    CHOL 153 12/04/2023    TRIG 171 (H) 12/04/2023    HDL 46 12/04/2023    VLDL 29 12/04/2023    LDL 78 12/04/2023     Lab Results   Component Value Date    LDL 78 12/04/2023    LDL 77 06/07/2023     Lab Results   Component Value Date    PROBNP 270.0 01/28/2022               Procedures       I personally viewed and " interpreted the patient's LAB data         Assessment:     1. Primary hypertension    2. Type 2 diabetes mellitus without complication, without long-term current use of insulin    3. Other specified hypothyroidism    4. Essential hypertension    5. Other emphysema    6. Mixed hyperlipidemia    7. Tobacco use          Plan:   Primary hypertension  Blood pressure is elevated  Amlodipine 2.5 mg daily was added.  She will continue losartan, Hygroton and Lopressor.     Diabetes mellitus  Blood sugar control is excellent she will continue metformin and Tradjenta.  Diet restriction discussed.    Hyperlipidemia  LDL is 78.  She will continue Lipitor 10 mg daily.    Cessation of tobacco use stressed.    Hypothyroidism  Synthroid 75 mcg daily was continued.  She is clinically euthyroid.  Lab work reviewed and discussed with the patient      No follow-ups on file.

## 2024-04-09 ENCOUNTER — OFFICE VISIT (OUTPATIENT)
Dept: CARDIOLOGY | Facility: CLINIC | Age: 69
End: 2024-04-09
Payer: MEDICARE

## 2024-04-09 VITALS
DIASTOLIC BLOOD PRESSURE: 76 MMHG | WEIGHT: 159 LBS | SYSTOLIC BLOOD PRESSURE: 120 MMHG | HEIGHT: 62 IN | OXYGEN SATURATION: 95 % | BODY MASS INDEX: 29.26 KG/M2 | HEART RATE: 62 BPM

## 2024-04-09 DIAGNOSIS — E11.9 TYPE 2 DIABETES MELLITUS WITHOUT COMPLICATION, WITHOUT LONG-TERM CURRENT USE OF INSULIN: ICD-10-CM

## 2024-04-09 DIAGNOSIS — I10 ESSENTIAL HYPERTENSION: ICD-10-CM

## 2024-04-09 DIAGNOSIS — Z72.0 TOBACCO USE: ICD-10-CM

## 2024-04-09 DIAGNOSIS — E78.2 MIXED HYPERLIPIDEMIA: Primary | ICD-10-CM

## 2024-04-09 DIAGNOSIS — I10 PRIMARY HYPERTENSION: ICD-10-CM

## 2024-04-09 DIAGNOSIS — J43.8 OTHER EMPHYSEMA: ICD-10-CM

## 2024-04-09 DIAGNOSIS — E03.8 OTHER SPECIFIED HYPOTHYROIDISM: ICD-10-CM

## 2024-04-09 RX ORDER — LEVOTHYROXINE SODIUM 0.07 MG/1
75 TABLET ORAL DAILY
Qty: 90 TABLET | Refills: 1 | Status: SHIPPED | OUTPATIENT
Start: 2024-04-09

## 2024-04-09 NOTE — PROGRESS NOTES
subjective     Chief Complaint   Patient presents with    Hypertension     Follow up    Med Refill     pending       Problems Addressed This Visit  1. Mixed hyperlipidemia    2. Other specified hypothyroidism    3. Essential hypertension    4. Other emphysema    5. Primary hypertension    6. Type 2 diabetes mellitus without complication, without long-term current use of insulin    7. Tobacco use        History of Present Illness    Patient is 68 years old white female who is here for follow-up.  Patient has multiple chronic medical problems however she is doing well overall.    Hyperlipidemia  She is trying to follow diet closely and is taking Lipitor 10 mg daily.  No drug side effects.    Hypothyroidism  Clinically euthyroid on Synthroid replacement therapy.  She is taking Synthroid 75 mcg daily.    Hypertension  Blood pressure is very well-controlled.  She is taking losartan 50 mg daily, Hygroton 25 mg daily, Norvasc 2.5 daily and Lopressor 25 twice daily.    Diabetes mellitus  Blood sugar according to her was 130 this morning.  She is taking metformin 1000 twice daily, Tradjenta 5 mg daily.    Unfortunately she continues to use tobacco.  COPD doing better.      Past Surgical History:   Procedure Laterality Date    CATARACT EXTRACTION, BILATERAL Bilateral     June and july 2020    CHOLECYSTECTOMY      COLONOSCOPY N/A 6/7/2022    Procedure: COLONOSCOPY FOR SCREENING;  Surgeon: Loren Leigh MD;  Location: [facility];  Service: Gastroenterology;  Laterality: N/A;    COLONOSCOPY W/ BIOPSIES  2012    HYSTERECTOMY      VARICOSE VEIN SURGERY Right      Family History   Problem Relation Age of Onset    Thyroid cancer Mother     Aneurysm Mother     Hypertension Mother     Diabetes Mother     Arthritis Mother     Heart attack Father     Heart disease Father     Diabetes Brother     Kidney failure Brother     Hypertension Brother     Diabetes Brother     Hypertension Brother     Breast cancer Maternal Aunt       Past Medical History:   Diagnosis Date    Arthritis     B12 deficiency     Bronchitis, acute     Chronic pain syndrome     COPD (chronic obstructive pulmonary disease)     Diabetes mellitus     Disease of thyroid gland     Elevated cholesterol     Hyperlipidemia     Hypertension      Patient Active Problem List   Diagnosis    Acute bronchitis    B12 deficiency*    Chronic pain syndrome*    Hypertension*    Hyperlipidemia*    Diabetes mellitus*    Hypothyroidism*    Vitamin D deficiency*    Right ear pain    Sebaceous cyst    Chronic bronchitis    Post-menopausal osteoporosis    Tobacco use    Environmental allergies    Edema leg    Closed fracture of multiple ribs of left side    Chest wall pain    Encounter for screening for malignant neoplasm of colon    Left foot pain    Hearing loss of right ear    Diastolic dysfunction, left ventricle       Social History     Tobacco Use    Smoking status: Every Day     Current packs/day: 0.50     Average packs/day: 0.5 packs/day for 10.0 years (5.0 ttl pk-yrs)     Types: Cigarettes    Smokeless tobacco: Never   Vaping Use    Vaping status: Never Used   Substance Use Topics    Alcohol use: No    Drug use: No       Allergies   Allergen Reactions    Avelox [Moxifloxacin] Hives     Burning itcing whelps allover       Current Outpatient Medications on File Prior to Visit   Medication Sig    albuterol sulfate  (90 Base) MCG/ACT inhaler Inhale 2 puffs Every 4 (Four) Hours As Needed for Wheezing.    amLODIPine (NORVASC) 2.5 MG tablet Take 1 tablet by mouth Daily.    atorvastatin (LIPITOR) 10 MG tablet Take 1 tablet by mouth Daily.    cetirizine (zyrTEC) 10 MG tablet Take 0.5 tablets by mouth Daily.    chlorthalidone (HYGROTON) 25 MG tablet Take 1 tablet by mouth Daily.    cholecalciferol (Vitamin D, Cholecalciferol,) 25 MCG (1000 UT) tablet Take 1 tablet by mouth Daily.    Diclofenac Sodium (VOLTAREN) 1 % gel gel Apply 4 g topically to the appropriate area as directed 4  (Four) Times a Day As Needed.    fluticasone (FLONASE) 50 MCG/ACT nasal spray 2 sprays into the nostril(s) as directed by provider Daily.    Fluticasone Furoate-Vilanterol (Breo Ellipta) 100-25 MCG/ACT aerosol powder  Inhale 1 puff Daily.    glucose blood test strip 1 each by Other route Daily.    hydroxychloroquine (PLAQUENIL) 200 MG tablet Take 1 tablet by mouth Daily.    Lancets (onetouch ultrasoft) lancets USES TWICE A DAY TO CHECK BLOOD SUGAR    linagliptin (TRADJENTA) 5 MG tablet tablet Take 1 tablet by mouth Daily.    losartan (COZAAR) 50 MG tablet Take 2 tablets by mouth Daily. for blood pressure    montelukast (SINGULAIR) 10 MG tablet Take 1 tablet by mouth every night at bedtime.    ondansetron (Zofran) 4 MG tablet Take 1 tablet by mouth Every 8 (Eight) Hours As Needed for Nausea or Vomiting.    Restasis 0.05 % ophthalmic emulsion Administer 1 drop into the left eye 2 (Two) Times a Day.    vitamin B-12 (CYANOCOBALAMIN) 1000 MCG tablet Take 1 tablet by mouth Daily.    [DISCONTINUED] levothyroxine (Synthroid) 50 MCG tablet Take 1.5 tablets by mouth Daily.    [DISCONTINUED] metFORMIN (GLUCOPHAGE) 1000 MG tablet Take 1 tablet by mouth 2 (Two) Times a Day With Meals.    [DISCONTINUED] metoprolol tartrate (LOPRESSOR) 25 MG tablet Take 1 tablet by mouth 2 (Two) Times a Day.     No current facility-administered medications on file prior to visit.         The following portions of the patient's history were reviewed and updated as appropriate: allergies, current medications, past family history, past medical history, past social history, past surgical history and problem list.    Review of Systems   Constitutional: Negative.   HENT: Negative.  Negative for congestion.    Eyes: Negative.    Cardiovascular: Negative.  Negative for chest pain, cyanosis, dyspnea on exertion, irregular heartbeat, leg swelling, near-syncope, orthopnea, palpitations, paroxysmal nocturnal dyspnea and syncope.   Respiratory: Negative.   "Negative for shortness of breath.    Hematologic/Lymphatic: Negative.    Musculoskeletal: Negative.    Gastrointestinal: Negative.    Neurological: Negative.  Negative for headaches.          Objective:     /76 (BP Location: Left arm, Patient Position: Sitting, Cuff Size: Adult)   Pulse 62   Ht 157.5 cm (62\")   Wt 72.1 kg (159 lb)   SpO2 95%   BMI 29.08 kg/m²   Cardiovascular:      PMI at left midclavicular line. Normal rate. Regular rhythm. Normal S1. Normal S2.       Murmurs: There is no murmur.      No gallop.  No click. No rub.   Pulses:     Intact distal pulses.   Edema:     Peripheral edema absent.           Lab Review  Lab Results   Component Value Date     12/04/2023    K 4.7 12/04/2023     12/04/2023    BUN 17 12/04/2023    CREATININE 0.90 12/04/2023    GLUCOSE 112 (H) 12/04/2023    CALCIUM 9.4 12/04/2023    ALT 8 12/04/2023    ALKPHOS 61 12/04/2023    LABIL2 1.5 04/28/2016     Lab Results   Component Value Date    CKTOTAL 32 12/04/2023     Lab Results   Component Value Date    WBC 10.00 12/04/2023    HGB 12.7 12/04/2023    HCT 39.9 12/04/2023     12/04/2023     No results found for: \"INR\"  No results found for: \"MG\"  Lab Results   Component Value Date    TSH 3.800 12/04/2023     No results found for: \"BNP\"  Lab Results   Component Value Date    CHLPL 184 04/28/2016    CHOL 153 12/04/2023    TRIG 171 (H) 12/04/2023    HDL 46 12/04/2023    VLDL 29 12/04/2023    LDL 78 12/04/2023     Lab Results   Component Value Date    LDL 78 12/04/2023    LDL 77 06/07/2023     Lab Results   Component Value Date    PROBNP 270.0 01/28/2022                 ECG 12 Lead    Date/Time: 4/10/2024 12:42 PM  Performed by: Evita Rosario MD    Authorized by: Evita Rosario MD  Comparison: compared with previous ECG from 4/5/2022  Similar to previous ECG  Rhythm: sinus rhythm  Rate: normal  BPM: 62  Conduction: incomplete right bundle branch block  ST Segments: ST segments normal  T " Waves: T waves normal  QRS axis: normal    Clinical impression: non-specific ECG             Lab work scheduled        Assessment:     1. Mixed hyperlipidemia    2. Other specified hypothyroidism    3. Essential hypertension    4. Other emphysema    5. Primary hypertension    6. Type 2 diabetes mellitus without complication, without long-term current use of insulin    7. Tobacco use          Plan:     Hyperlipidemia  Last LDL was 78..  She will continue Lipitor 10 mg daily healthy lifestyle again emphasized.  Lab work scheduled for next visit.    Hypothyroidism  Clinically euthyroid she will continue current dose of Synthroid will check T4 TSH next visit.    Hypertension  Blood pressure is very well-controlled.  No change in therapy.    COPD with ongoing tobacco use.  Cessation of smoking was stressed.    Diabetes mellitus  Will check hemoglobin A1c next visit she will continue metformin and Tradjenta.    Healthy lifestyle emphasized follow-up scheduled            No follow-ups on file.

## 2024-06-07 ENCOUNTER — LAB (OUTPATIENT)
Dept: LAB | Facility: HOSPITAL | Age: 69
End: 2024-06-07
Payer: MEDICARE

## 2024-06-07 DIAGNOSIS — E11.9 TYPE 2 DIABETES MELLITUS WITHOUT COMPLICATION, WITHOUT LONG-TERM CURRENT USE OF INSULIN: ICD-10-CM

## 2024-06-07 DIAGNOSIS — E78.2 MIXED HYPERLIPIDEMIA: ICD-10-CM

## 2024-06-07 DIAGNOSIS — E03.8 OTHER SPECIFIED HYPOTHYROIDISM: ICD-10-CM

## 2024-06-07 LAB
ALBUMIN SERPL-MCNC: 3.9 G/DL (ref 3.5–5.2)
ALBUMIN UR-MCNC: <1.2 MG/DL
ALBUMIN/GLOB SERPL: 1.4 G/DL
ALP SERPL-CCNC: 65 U/L (ref 39–117)
ALT SERPL W P-5'-P-CCNC: 10 U/L (ref 1–33)
ANION GAP SERPL CALCULATED.3IONS-SCNC: 13.3 MMOL/L (ref 5–15)
AST SERPL-CCNC: 8 U/L (ref 1–32)
BASOPHILS # BLD AUTO: 0.07 10*3/MM3 (ref 0–0.2)
BASOPHILS NFR BLD AUTO: 0.8 % (ref 0–1.5)
BILIRUB SERPL-MCNC: 0.2 MG/DL (ref 0–1.2)
BUN SERPL-MCNC: 14 MG/DL (ref 8–23)
BUN/CREAT SERPL: 16.5 (ref 7–25)
CALCIUM SPEC-SCNC: 9 MG/DL (ref 8.6–10.5)
CHLORIDE SERPL-SCNC: 98 MMOL/L (ref 98–107)
CHOLEST SERPL-MCNC: 174 MG/DL (ref 0–200)
CK SERPL-CCNC: 52 U/L (ref 20–180)
CO2 SERPL-SCNC: 23.7 MMOL/L (ref 22–29)
CREAT SERPL-MCNC: 0.85 MG/DL (ref 0.57–1)
DEPRECATED RDW RBC AUTO: 45.2 FL (ref 37–54)
EGFRCR SERPLBLD CKD-EPI 2021: 73.8 ML/MIN/1.73
EOSINOPHIL # BLD AUTO: 0.17 10*3/MM3 (ref 0–0.4)
EOSINOPHIL NFR BLD AUTO: 2 % (ref 0.3–6.2)
ERYTHROCYTE [DISTWIDTH] IN BLOOD BY AUTOMATED COUNT: 13.2 % (ref 12.3–15.4)
GLOBULIN UR ELPH-MCNC: 2.7 GM/DL
GLUCOSE SERPL-MCNC: 133 MG/DL (ref 65–99)
HBA1C MFR BLD: 5.8 % (ref 4.8–5.6)
HCT VFR BLD AUTO: 37.9 % (ref 34–46.6)
HDLC SERPL-MCNC: 49 MG/DL (ref 40–60)
HGB BLD-MCNC: 12.5 G/DL (ref 12–15.9)
IMM GRANULOCYTES # BLD AUTO: 0.04 10*3/MM3 (ref 0–0.05)
IMM GRANULOCYTES NFR BLD AUTO: 0.5 % (ref 0–0.5)
LDLC SERPL CALC-MCNC: 105 MG/DL (ref 0–100)
LDLC/HDLC SERPL: 2.1 {RATIO}
LYMPHOCYTES # BLD AUTO: 1.44 10*3/MM3 (ref 0.7–3.1)
LYMPHOCYTES NFR BLD AUTO: 16.9 % (ref 19.6–45.3)
MCH RBC QN AUTO: 30.6 PG (ref 26.6–33)
MCHC RBC AUTO-ENTMCNC: 33 G/DL (ref 31.5–35.7)
MCV RBC AUTO: 92.9 FL (ref 79–97)
MONOCYTES # BLD AUTO: 0.6 10*3/MM3 (ref 0.1–0.9)
MONOCYTES NFR BLD AUTO: 7 % (ref 5–12)
NEUTROPHILS NFR BLD AUTO: 6.21 10*3/MM3 (ref 1.7–7)
NEUTROPHILS NFR BLD AUTO: 72.8 % (ref 42.7–76)
NRBC BLD AUTO-RTO: 0 /100 WBC (ref 0–0.2)
PLATELET # BLD AUTO: 399 10*3/MM3 (ref 140–450)
PMV BLD AUTO: 10 FL (ref 6–12)
POTASSIUM SERPL-SCNC: 4.7 MMOL/L (ref 3.5–5.2)
PROT SERPL-MCNC: 6.6 G/DL (ref 6–8.5)
RBC # BLD AUTO: 4.08 10*6/MM3 (ref 3.77–5.28)
SODIUM SERPL-SCNC: 135 MMOL/L (ref 136–145)
T4 FREE SERPL-MCNC: 1.6 NG/DL (ref 0.92–1.68)
TRIGL SERPL-MCNC: 111 MG/DL (ref 0–150)
TSH SERPL DL<=0.05 MIU/L-ACNC: 3.38 UIU/ML (ref 0.27–4.2)
VLDLC SERPL-MCNC: 20 MG/DL (ref 5–40)
WBC NRBC COR # BLD AUTO: 8.53 10*3/MM3 (ref 3.4–10.8)

## 2024-06-07 PROCEDURE — 80061 LIPID PANEL: CPT

## 2024-06-07 PROCEDURE — 83036 HEMOGLOBIN GLYCOSYLATED A1C: CPT

## 2024-06-07 PROCEDURE — 84443 ASSAY THYROID STIM HORMONE: CPT

## 2024-06-07 PROCEDURE — 85025 COMPLETE CBC W/AUTO DIFF WBC: CPT

## 2024-06-07 PROCEDURE — 82043 UR ALBUMIN QUANTITATIVE: CPT

## 2024-06-07 PROCEDURE — 80053 COMPREHEN METABOLIC PANEL: CPT

## 2024-06-07 PROCEDURE — 82550 ASSAY OF CK (CPK): CPT

## 2024-06-07 PROCEDURE — 36415 COLL VENOUS BLD VENIPUNCTURE: CPT

## 2024-06-07 PROCEDURE — 84439 ASSAY OF FREE THYROXINE: CPT

## 2024-07-09 ENCOUNTER — OFFICE VISIT (OUTPATIENT)
Dept: CARDIOLOGY | Facility: CLINIC | Age: 69
End: 2024-07-09
Payer: MEDICARE

## 2024-07-09 VITALS
SYSTOLIC BLOOD PRESSURE: 136 MMHG | HEIGHT: 62 IN | HEART RATE: 63 BPM | DIASTOLIC BLOOD PRESSURE: 76 MMHG | BODY MASS INDEX: 28.89 KG/M2 | OXYGEN SATURATION: 96 % | WEIGHT: 157 LBS

## 2024-07-09 DIAGNOSIS — E03.8 OTHER SPECIFIED HYPOTHYROIDISM: ICD-10-CM

## 2024-07-09 DIAGNOSIS — E11.9 TYPE 2 DIABETES MELLITUS WITHOUT COMPLICATION, WITHOUT LONG-TERM CURRENT USE OF INSULIN: ICD-10-CM

## 2024-07-09 DIAGNOSIS — J43.8 OTHER EMPHYSEMA: ICD-10-CM

## 2024-07-09 DIAGNOSIS — I10 ESSENTIAL HYPERTENSION: ICD-10-CM

## 2024-07-09 PROCEDURE — 3078F DIAST BP <80 MM HG: CPT | Performed by: INTERNAL MEDICINE

## 2024-07-09 PROCEDURE — 3075F SYST BP GE 130 - 139MM HG: CPT | Performed by: INTERNAL MEDICINE

## 2024-07-09 PROCEDURE — 99214 OFFICE O/P EST MOD 30 MIN: CPT | Performed by: INTERNAL MEDICINE

## 2024-07-09 RX ORDER — ONDANSETRON 4 MG/1
4 TABLET, ORALLY DISINTEGRATING ORAL DAILY PRN
Qty: 30 TABLET | Refills: 0 | Status: SHIPPED | OUTPATIENT
Start: 2024-07-09

## 2024-07-09 RX ORDER — CHLORTHALIDONE 25 MG/1
12.5 TABLET ORAL DAILY
Qty: 45 TABLET | Refills: 1 | Status: SHIPPED | OUTPATIENT
Start: 2024-07-09

## 2024-07-09 RX ORDER — ONDANSETRON 4 MG/1
4 TABLET, FILM COATED ORAL EVERY 8 HOURS PRN
Qty: 14 TABLET | Refills: 1 | Status: CANCELLED | OUTPATIENT
Start: 2024-07-09

## 2024-07-09 RX ORDER — LOSARTAN POTASSIUM 50 MG/1
100 TABLET ORAL DAILY
Qty: 180 TABLET | Refills: 2 | Status: SHIPPED | OUTPATIENT
Start: 2024-07-09

## 2024-07-09 NOTE — PROGRESS NOTES
subjective     Chief Complaint   Patient presents with    Results     labs    Hyperlipidemia    Med Refill     pending       Problems Addressed This Visit  1. Type 2 diabetes mellitus without complication, without long-term current use of insulin    2. Other specified hypothyroidism    3. Essential hypertension    4. Other emphysema        History of Present Illness    Patient is 69 years old white female who is here for follow-up.  She is doing fairly well.  She is taking her medications regularly without any drug side effects.    Diabetes mellitus  She is taking Glucophage and Tradjenta, lab work will be reviewed and discussed with the patient.    Hypertension  She is taking metoprolol tartrate 25 mg twice a day, Norvasc 2.5 mg daily, losartan 100 mg daily and Hygroton 12.5 mg daily.  Blood pressure is very well-controlled.    Hypothyroidism  She is taking Synthroid 75 mcg daily clinically euthyroid.    COPD  She is taking Breo Ellipta and albuterol.    She complains of leg cramps    Past Surgical History:   Procedure Laterality Date    CATARACT EXTRACTION, BILATERAL Bilateral     June and july 2020    CHOLECYSTECTOMY      COLONOSCOPY N/A 6/7/2022    Procedure: COLONOSCOPY FOR SCREENING;  Surgeon: Loren Leigh MD;  Location: [facility];  Service: Gastroenterology;  Laterality: N/A;    COLONOSCOPY W/ BIOPSIES  2012    HYSTERECTOMY      VARICOSE VEIN SURGERY Right      Family History   Problem Relation Age of Onset    Thyroid cancer Mother     Aneurysm Mother     Hypertension Mother     Diabetes Mother     Arthritis Mother     Heart attack Father     Heart disease Father     Diabetes Brother     Kidney failure Brother     Hypertension Brother     Diabetes Brother     Hypertension Brother     Breast cancer Maternal Aunt      Past Medical History:   Diagnosis Date    Arthritis     B12 deficiency     Bronchitis, acute     Chronic pain syndrome     COPD (chronic obstructive pulmonary disease)     Diabetes  mellitus     Disease of thyroid gland     Elevated cholesterol     Hyperlipidemia     Hypertension      Patient Active Problem List   Diagnosis    Acute bronchitis    B12 deficiency*    Chronic pain syndrome*    Hypertension*    Hyperlipidemia*    Diabetes mellitus*    Hypothyroidism*    Vitamin D deficiency*    Right ear pain    Sebaceous cyst    Chronic bronchitis    Post-menopausal osteoporosis    Tobacco use    Environmental allergies    Edema leg    Closed fracture of multiple ribs of left side    Chest wall pain    Encounter for screening for malignant neoplasm of colon    Left foot pain    Hearing loss of right ear    Diastolic dysfunction, left ventricle       Social History     Tobacco Use    Smoking status: Every Day     Current packs/day: 0.50     Average packs/day: 0.5 packs/day for 10.0 years (5.0 ttl pk-yrs)     Types: Cigarettes    Smokeless tobacco: Never   Vaping Use    Vaping status: Never Used   Substance Use Topics    Alcohol use: No    Drug use: No       Allergies   Allergen Reactions    Avelox [Moxifloxacin] Hives     Burning itcing whelps allover       Current Outpatient Medications on File Prior to Visit   Medication Sig    albuterol sulfate  (90 Base) MCG/ACT inhaler Inhale 2 puffs Every 4 (Four) Hours As Needed for Wheezing.    amLODIPine (NORVASC) 2.5 MG tablet Take 1 tablet by mouth Daily.    atorvastatin (LIPITOR) 10 MG tablet Take 1 tablet by mouth Daily.    cetirizine (zyrTEC) 10 MG tablet Take 0.5 tablets by mouth Daily.    chlorthalidone (HYGROTON) 25 MG tablet Take 1 tablet by mouth Daily.    cholecalciferol (Vitamin D, Cholecalciferol,) 25 MCG (1000 UT) tablet Take 1 tablet by mouth Daily.    Diclofenac Sodium (VOLTAREN) 1 % gel gel Apply 4 g topically to the appropriate area as directed 4 (Four) Times a Day As Needed.    fluticasone (FLONASE) 50 MCG/ACT nasal spray 2 sprays into the nostril(s) as directed by provider Daily.    Fluticasone Furoate-Vilanterol (Breo Ellipta)  100-25 MCG/ACT aerosol powder  Inhale 1 puff Daily.    glucose blood test strip 1 each by Other route Daily.    hydroxychloroquine (PLAQUENIL) 200 MG tablet Take 1 tablet by mouth Daily.    Lancets (onetouch ultrasoft) lancets USES TWICE A DAY TO CHECK BLOOD SUGAR    levothyroxine (SYNTHROID, LEVOTHROID) 75 MCG tablet Take 1 tablet by mouth Daily.    linagliptin (TRADJENTA) 5 MG tablet tablet Take 1 tablet by mouth Daily.    losartan (COZAAR) 50 MG tablet Take 2 tablets by mouth Daily. for blood pressure    metFORMIN (GLUCOPHAGE) 1000 MG tablet Take 1 tablet by mouth 2 (Two) Times a Day With Meals.    metoprolol tartrate (LOPRESSOR) 25 MG tablet Take 1 tablet by mouth 2 (Two) Times a Day.    montelukast (SINGULAIR) 10 MG tablet Take 1 tablet by mouth every night at bedtime.    ondansetron (Zofran) 4 MG tablet Take 1 tablet by mouth Every 8 (Eight) Hours As Needed for Nausea or Vomiting.    Restasis 0.05 % ophthalmic emulsion Administer 1 drop into the left eye 2 (Two) Times a Day.    vitamin B-12 (CYANOCOBALAMIN) 1000 MCG tablet Take 1 tablet by mouth Daily.     No current facility-administered medications on file prior to visit.     (Not in a hospital admission)      Results for orders placed during the hospital encounter of 02/17/22    Adult Transthoracic Echo Complete W/ Cont if Necessary Per Protocol    Interpretation Summary  · Normal left ventricular cavity size and wall thickness noted.  · Left ventricular ejection fraction appears to be 61 - 65%.  · Left ventricular diastolic function is consistent with (grade I) impaired relaxation.  · Estimated right ventricular systolic pressure from tricuspid regurgitation is mildly elevated (35-45 mmHg).  · Aortic valve is a trileaflet valve with no evidence of aortic stenosis or aortic regurgitation.  · Mitral valve appears normal, no evidence of mitral stenosis and mitral regurgitation noted.  · Tricuspid valve appears normal, physiological tricuspid regurgitation is  "noted with mild pulmonary hypertension. Peak RV systolic pressure is mildly elevated at 38 mmHg.  · No pulmonic stenosis or regurgitation noted  · No pericardial effusion detected            The following portions of the patient's history were reviewed and updated as appropriate: allergies, current medications, past family history, past medical history, past social history, past surgical history and problem list.    Review of Systems   Constitutional: Negative.   HENT: Negative.  Negative for congestion.    Eyes: Negative.    Cardiovascular: Negative.  Negative for chest pain, cyanosis, dyspnea on exertion, irregular heartbeat, leg swelling, near-syncope, orthopnea, palpitations, paroxysmal nocturnal dyspnea and syncope.   Respiratory: Negative.  Negative for shortness of breath.    Hematologic/Lymphatic: Negative.    Musculoskeletal: Negative.    Gastrointestinal: Negative.    Neurological: Negative.  Negative for headaches.          Objective:     /76 (BP Location: Left arm, Patient Position: Sitting, Cuff Size: Adult)   Pulse 63   Ht 157.5 cm (62\")   Wt 71.2 kg (157 lb)   SpO2 96%   BMI 28.72 kg/m²   Pulmonary:      Effort: Pulmonary effort is normal.      Breath sounds: Normal breath sounds. No stridor. No wheezing. No rhonchi. No rales.   Cardiovascular:      PMI at left midclavicular line. Normal rate. Regular rhythm. Normal S1. Normal S2.       Murmurs: There is no murmur.      No gallop.  No click. No rub.   Pulses:     Intact distal pulses.   Edema:     Peripheral edema absent.           Lab Review  Lab Results   Component Value Date     (L) 06/07/2024    K 4.7 06/07/2024    CL 98 06/07/2024    BUN 14 06/07/2024    CREATININE 0.85 06/07/2024    GLUCOSE 133 (H) 06/07/2024    CALCIUM 9.0 06/07/2024    ALT 10 06/07/2024    ALKPHOS 65 06/07/2024    LABIL2 1.5 04/28/2016     Lab Results   Component Value Date    CKTOTAL 52 06/07/2024     Lab Results   Component Value Date    WBC 8.53 06/07/2024 " "   HGB 12.5 06/07/2024    HCT 37.9 06/07/2024     06/07/2024     No results found for: \"INR\"  No results found for: \"MG\"  Lab Results   Component Value Date    TSH 3.380 06/07/2024     No results found for: \"BNP\"  Lab Results   Component Value Date    CHLPL 184 04/28/2016    CHOL 174 06/07/2024    TRIG 111 06/07/2024    HDL 49 06/07/2024    VLDL 20 06/07/2024     (H) 06/07/2024     Lab Results   Component Value Date     (H) 06/07/2024    LDL 78 12/04/2023     Lab Results   Component Value Date    PROBNP 270.0 01/28/2022               Procedures       I personally viewed and interpreted the patient's LAB data         Assessment:     1. Type 2 diabetes mellitus without complication, without long-term current use of insulin    2. Other specified hypothyroidism    3. Essential hypertension    4. Other emphysema          Plan:     Diabetes mellitus type 2  Lab work reviewed and discussed with the patient.  Hemoglobin A1c is 5.8.  She will continue current diabetic medications.  Blood sugar is 133.    Hyperlipidemia  LDL is higher ,around 105.  Last LDL was 78 on same dose of Lipitor.  Dietary restrictions were again discussed.  If LDL is persistently more than 100 will increase Lipitor at next visit.    Hypertension is very well-controlled no change in therapy was made.    Hypothyroidism  Synthroid continued.  TSH 3.38.    Follow-up scheduled    No follow-ups on file.  "

## 2024-07-16 ENCOUNTER — TELEPHONE (OUTPATIENT)
Dept: CARDIOLOGY | Facility: CLINIC | Age: 69
End: 2024-07-16
Payer: MEDICARE

## 2024-07-16 NOTE — TELEPHONE ENCOUNTER
[facility] wanting to know if the patient is okay for statin therapy if needed.      Please call back Mi back at   1 [phone]

## 2024-09-30 DIAGNOSIS — J43.8 OTHER EMPHYSEMA: ICD-10-CM

## 2024-09-30 DIAGNOSIS — E03.8 OTHER SPECIFIED HYPOTHYROIDISM: ICD-10-CM

## 2024-09-30 DIAGNOSIS — I10 ESSENTIAL HYPERTENSION: ICD-10-CM

## 2024-09-30 RX ORDER — METOPROLOL TARTRATE 25 MG/1
TABLET, FILM COATED ORAL
Qty: 180 TABLET | Refills: 0 | Status: SHIPPED | OUTPATIENT
Start: 2024-09-30

## 2024-10-01 ENCOUNTER — OFFICE VISIT (OUTPATIENT)
Dept: CARDIOLOGY | Facility: CLINIC | Age: 69
End: 2024-10-01
Payer: MEDICARE

## 2024-10-01 VITALS
OXYGEN SATURATION: 96 % | BODY MASS INDEX: 29.63 KG/M2 | HEIGHT: 62 IN | HEART RATE: 71 BPM | WEIGHT: 161 LBS | DIASTOLIC BLOOD PRESSURE: 70 MMHG | SYSTOLIC BLOOD PRESSURE: 122 MMHG

## 2024-10-01 DIAGNOSIS — E78.2 MIXED HYPERLIPIDEMIA: ICD-10-CM

## 2024-10-01 DIAGNOSIS — J42 CHRONIC BRONCHITIS, UNSPECIFIED CHRONIC BRONCHITIS TYPE: ICD-10-CM

## 2024-10-01 DIAGNOSIS — E11.9 TYPE 2 DIABETES MELLITUS WITHOUT COMPLICATION, WITHOUT LONG-TERM CURRENT USE OF INSULIN: ICD-10-CM

## 2024-10-01 DIAGNOSIS — I10 PRIMARY HYPERTENSION: Primary | ICD-10-CM

## 2024-10-01 DIAGNOSIS — E03.9 HYPOTHYROIDISM, UNSPECIFIED TYPE: ICD-10-CM

## 2024-10-01 DIAGNOSIS — E55.9 VITAMIN D DEFICIENCY: ICD-10-CM

## 2024-10-01 PROCEDURE — 99214 OFFICE O/P EST MOD 30 MIN: CPT | Performed by: INTERNAL MEDICINE

## 2024-10-01 PROCEDURE — 3074F SYST BP LT 130 MM HG: CPT | Performed by: INTERNAL MEDICINE

## 2024-10-01 PROCEDURE — 3078F DIAST BP <80 MM HG: CPT | Performed by: INTERNAL MEDICINE

## 2024-10-01 RX ORDER — ONDANSETRON 4 MG/1
4 TABLET, ORALLY DISINTEGRATING ORAL DAILY PRN
Qty: 30 TABLET | Refills: 0 | Status: SHIPPED | OUTPATIENT
Start: 2024-10-01

## 2024-10-01 NOTE — PROGRESS NOTES
subjective     Chief Complaint   Patient presents with    Diabetes    Hyperlipidemia    Hypertension       Problems Addressed This Visit  1. Primary hypertension    2. Mixed hyperlipidemia    3. Hypothyroidism, unspecified type    4. Type 2 diabetes mellitus without complication, without long-term current use of insulin    5. Chronic bronchitis, unspecified chronic bronchitis type    6. Vitamin D deficiency        History of Present Illness    Leonarda is 69 years old white female who is here for follow-up.  She has multiple chronic medical problems and is doing very well.    Hypertension  Blood pressure is very well-controlled.  She is taking metoprolol , losartan, Norvasc and Hygroton.    Hyperlipidemia  She is trying to follow diet and is taking Lipitor 10 mg daily.  No drug side effects.    Hypothyroidism  Patient is euthyroid on Synthroid 75 mcg daily.    Diabetes mellitus type 2  She is trying to follow diet and is taking Tradjenta and metformin.    Patient also has mild COPD and is taking albuterol along with Breo Ellipta.  Because of multiple permanent allergies she is also taking Singulair, Flonase and Zyrtec.      Past Surgical History:   Procedure Laterality Date    CATARACT EXTRACTION, BILATERAL Bilateral     June and july 2020    CHOLECYSTECTOMY      COLONOSCOPY N/A 6/7/2022    Procedure: COLONOSCOPY FOR SCREENING;  Surgeon: Loern Leigh MD;  Location: [facility];  Service: Gastroenterology;  Laterality: N/A;    COLONOSCOPY W/ BIOPSIES  2012    HYSTERECTOMY      VARICOSE VEIN SURGERY Right      Family History   Problem Relation Age of Onset    Thyroid cancer Mother     Aneurysm Mother     Hypertension Mother     Diabetes Mother     Arthritis Mother     Heart attack Father     Heart disease Father     Diabetes Brother     Kidney failure Brother     Hypertension Brother     Diabetes Brother     Hypertension Brother     Breast cancer Maternal Aunt      Past Medical History:   Diagnosis Date     Arthritis     B12 deficiency     Bronchitis, acute     Chronic pain syndrome     COPD (chronic obstructive pulmonary disease)     Diabetes mellitus     Disease of thyroid gland     Elevated cholesterol     Hyperlipidemia     Hypertension      Patient Active Problem List   Diagnosis    Acute bronchitis    B12 deficiency*    Chronic pain syndrome*    Hypertension*    Hyperlipidemia*    Diabetes mellitus*    Hypothyroidism*    Vitamin D deficiency*    Right ear pain    Sebaceous cyst    Chronic bronchitis    Post-menopausal osteoporosis    Tobacco use    Environmental allergies    Edema leg    Closed fracture of multiple ribs of left side    Chest wall pain    Encounter for screening for malignant neoplasm of colon    Left foot pain    Hearing loss of right ear    Diastolic dysfunction, left ventricle       Social History     Tobacco Use    Smoking status: Every Day     Current packs/day: 0.50     Average packs/day: 0.5 packs/day for 10.0 years (5.0 ttl pk-yrs)     Types: Cigarettes     Passive exposure: Past    Smokeless tobacco: Never   Vaping Use    Vaping status: Never Used   Substance Use Topics    Alcohol use: No    Drug use: No       Allergies   Allergen Reactions    Avelox [Moxifloxacin] Hives     Burning itcing whelps allover       Current Outpatient Medications on File Prior to Visit   Medication Sig    albuterol sulfate  (90 Base) MCG/ACT inhaler Inhale 2 puffs Every 4 (Four) Hours As Needed for Wheezing.    amLODIPine (NORVASC) 2.5 MG tablet Take 1 tablet by mouth Daily.    atorvastatin (LIPITOR) 10 MG tablet Take 1 tablet by mouth Daily.    cetirizine (zyrTEC) 10 MG tablet Take 0.5 tablets by mouth Daily.    chlorthalidone (HYGROTON) 25 MG tablet Take 0.5 tablets by mouth Daily.    cholecalciferol (Vitamin D, Cholecalciferol,) 25 MCG (1000 UT) tablet Take 1 tablet by mouth Daily.    Diclofenac Sodium (VOLTAREN) 1 % gel gel Apply 4 g topically to the appropriate area as directed 4 (Four) Times a Day  As Needed.    fluticasone (FLONASE) 50 MCG/ACT nasal spray Administer 2 sprays into the nostril(s) as directed by provider Daily.    Fluticasone Furoate-Vilanterol (Breo Ellipta) 100-25 MCG/ACT aerosol powder  Inhale 1 puff Daily.    glucose blood test strip 1 each by Other route Daily.    hydroxychloroquine (PLAQUENIL) 200 MG tablet Take 1 tablet by mouth Daily.    Lancets (onetouch ultrasoft) lancets USES TWICE A DAY TO CHECK BLOOD SUGAR    levothyroxine (SYNTHROID, LEVOTHROID) 75 MCG tablet Take 1 tablet by mouth Daily.    linagliptin (TRADJENTA) 5 MG tablet tablet Take 1 tablet by mouth Daily.    losartan (COZAAR) 50 MG tablet Take 2 tablets by mouth Daily. for blood pressure    metFORMIN (GLUCOPHAGE) 1000 MG tablet Take 1 tablet by mouth 2 (Two) Times a Day With Meals.    metoprolol tartrate (LOPRESSOR) 25 MG tablet TAKE ONE TABLET BY MOUTH TWICE DAILY FOR HEART AND BLOOD PRESSURE    montelukast (SINGULAIR) 10 MG tablet Take 1 tablet by mouth every night at bedtime.    Restasis 0.05 % ophthalmic emulsion Administer 1 drop into the left eye 2 (Two) Times a Day.    vitamin B-12 (CYANOCOBALAMIN) 1000 MCG tablet Take 1 tablet by mouth Daily.    [DISCONTINUED] ondansetron ODT (ZOFRAN-ODT) 4 MG disintegrating tablet Place 1 tablet on the tongue Daily As Needed for Nausea or Vomiting.     No current facility-administered medications on file prior to visit.     (Not in a hospital admission)      Results for orders placed during the hospital encounter of 02/17/22    Adult Transthoracic Echo Complete W/ Cont if Necessary Per Protocol    Interpretation Summary  · Normal left ventricular cavity size and wall thickness noted.  · Left ventricular ejection fraction appears to be 61 - 65%.  · Left ventricular diastolic function is consistent with (grade I) impaired relaxation.  · Estimated right ventricular systolic pressure from tricuspid regurgitation is mildly elevated (35-45 mmHg).  · Aortic valve is a trileaflet valve  "with no evidence of aortic stenosis or aortic regurgitation.  · Mitral valve appears normal, no evidence of mitral stenosis and mitral regurgitation noted.  · Tricuspid valve appears normal, physiological tricuspid regurgitation is noted with mild pulmonary hypertension. Peak RV systolic pressure is mildly elevated at 38 mmHg.  · No pulmonic stenosis or regurgitation noted  · No pericardial effusion detected            The following portions of the patient's history were reviewed and updated as appropriate: allergies, current medications, past family history, past medical history, past social history, past surgical history and problem list.    Review of Systems   Constitutional: Negative.   HENT: Negative.  Negative for congestion.    Eyes: Negative.    Cardiovascular: Negative.  Negative for chest pain, cyanosis, dyspnea on exertion, irregular heartbeat, leg swelling, near-syncope, orthopnea, palpitations, paroxysmal nocturnal dyspnea and syncope.   Respiratory: Negative.  Negative for shortness of breath.    Hematologic/Lymphatic: Negative.    Musculoskeletal: Negative.    Gastrointestinal: Negative.    Neurological: Negative.  Negative for headaches.          Objective:     /70 (BP Location: Left arm, Patient Position: Sitting, Cuff Size: Adult)   Pulse 71   Ht 157.5 cm (62\")   Wt 73 kg (161 lb)   SpO2 96%   BMI 29.45 kg/m²   Pulmonary:      Effort: Pulmonary effort is normal.      Breath sounds: No stridor. Wheezing present. No rales.   Cardiovascular:      PMI at left midclavicular line. Normal rate. Regular rhythm. Normal S1. Normal S2.       Murmurs: There is no murmur.      No gallop.  No click. No rub.   Pulses:     Intact distal pulses.   Edema:     Peripheral edema absent.           Lab Review  Lab Results   Component Value Date     (L) 06/07/2024    K 4.7 06/07/2024    CL 98 06/07/2024    BUN 14 06/07/2024    CREATININE 0.85 06/07/2024    GLUCOSE 133 (H) 06/07/2024    CALCIUM 9.0 " "06/07/2024    ALT 10 06/07/2024    ALKPHOS 65 06/07/2024    LABIL2 1.5 04/28/2016     Lab Results   Component Value Date    CKTOTAL 52 06/07/2024     Lab Results   Component Value Date    WBC 8.53 06/07/2024    HGB 12.5 06/07/2024    HCT 37.9 06/07/2024     06/07/2024     No results found for: \"INR\"  No results found for: \"MG\"  Lab Results   Component Value Date    TSH 3.380 06/07/2024     No results found for: \"BNP\"  Lab Results   Component Value Date    CHLPL 184 04/28/2016    CHOL 174 06/07/2024    TRIG 111 06/07/2024    HDL 49 06/07/2024    VLDL 20 06/07/2024     (H) 06/07/2024     Lab Results   Component Value Date     (H) 06/07/2024    LDL 78 12/04/2023     Lab Results   Component Value Date    PROBNP 270.0 01/28/2022               Procedures       I personally viewed and interpreted the patient's LAB data         Assessment:     1. Primary hypertension    2. Mixed hyperlipidemia    3. Hypothyroidism, unspecified type    4. Type 2 diabetes mellitus without complication, without long-term current use of insulin    5. Chronic bronchitis, unspecified chronic bronchitis type    6. Vitamin D deficiency          Plan:     Hypertension  Blood pressure is very well-controlled patient will continue current medications including Lopressor, losartan, Norvasc and Hygroton.      Hyperlipidemia last LDL was 105 patient was advised to follow diet and continue Lipitor.  Will check lab work next visit.    Hypothyroidism  Clinically euthyroid she will continue Synthroid 75 mcg daily.    Diabetes mellitus type 2 healthy lifestyle emphasized she will continue metformin and Tradjenta.    Chronic bronchitis and multiple allergies she will take albuterol inhaler on as needed basis and continue Breo Ellipta twice a day.  She will also continue Singulair Zyrtec and Flonase.    Lab work scheduled follow-up scheduled        No follow-ups on file.  "

## 2024-10-24 ENCOUNTER — TRANSCRIBE ORDERS (OUTPATIENT)
Dept: ADMINISTRATIVE | Facility: HOSPITAL | Age: 69
End: 2024-10-24
Payer: MEDICARE

## 2024-10-24 DIAGNOSIS — Z12.31 VISIT FOR SCREENING MAMMOGRAM: Primary | ICD-10-CM

## 2024-11-11 ENCOUNTER — HOSPITAL ENCOUNTER (OUTPATIENT)
Dept: MAMMOGRAPHY | Facility: HOSPITAL | Age: 69
Discharge: HOME | End: 2024-11-11
Admitting: INTERNAL MEDICINE
Payer: MEDICARE

## 2024-11-11 DIAGNOSIS — Z12.31 VISIT FOR SCREENING MAMMOGRAM: ICD-10-CM

## 2024-11-11 PROCEDURE — 77063 BREAST TOMOSYNTHESIS BI: CPT

## 2024-11-11 PROCEDURE — 77067 SCR MAMMO BI INCL CAD: CPT

## 2024-11-11 PROCEDURE — 77063 BREAST TOMOSYNTHESIS BI: CPT | Performed by: RADIOLOGY

## 2024-11-11 PROCEDURE — 77067 SCR MAMMO BI INCL CAD: CPT | Performed by: RADIOLOGY

## 2024-12-04 ENCOUNTER — LAB (OUTPATIENT)
Dept: LAB | Facility: HOSPITAL | Age: 69
End: 2024-12-04
Payer: MEDICARE

## 2024-12-04 ENCOUNTER — TELEPHONE (OUTPATIENT)
Dept: CARDIOLOGY | Facility: CLINIC | Age: 69
End: 2024-12-04
Payer: MEDICARE

## 2024-12-04 DIAGNOSIS — E03.9 HYPOTHYROIDISM, UNSPECIFIED TYPE: ICD-10-CM

## 2024-12-04 DIAGNOSIS — E78.2 MIXED HYPERLIPIDEMIA: ICD-10-CM

## 2024-12-04 DIAGNOSIS — E55.9 VITAMIN D DEFICIENCY: ICD-10-CM

## 2024-12-04 DIAGNOSIS — E11.9 TYPE 2 DIABETES MELLITUS WITHOUT COMPLICATION, WITHOUT LONG-TERM CURRENT USE OF INSULIN: ICD-10-CM

## 2024-12-04 LAB
25(OH)D3 SERPL-MCNC: 46 NG/ML (ref 30–100)
ALBUMIN SERPL-MCNC: 3.9 G/DL (ref 3.5–5.2)
ALBUMIN UR-MCNC: 4 MG/DL
ALBUMIN/GLOB SERPL: 1 G/DL
ALP SERPL-CCNC: 71 U/L (ref 39–117)
ALT SERPL W P-5'-P-CCNC: 8 U/L (ref 1–33)
ANION GAP SERPL CALCULATED.3IONS-SCNC: 12.9 MMOL/L (ref 5–15)
AST SERPL-CCNC: 11 U/L (ref 1–32)
BASOPHILS # BLD AUTO: 0.07 10*3/MM3 (ref 0–0.2)
BASOPHILS NFR BLD AUTO: 0.8 % (ref 0–1.5)
BILIRUB SERPL-MCNC: 0.3 MG/DL (ref 0–1.2)
BUN SERPL-MCNC: 17 MG/DL (ref 8–23)
BUN/CREAT SERPL: 17.2 (ref 7–25)
CALCIUM SPEC-SCNC: 9.4 MG/DL (ref 8.6–10.5)
CHLORIDE SERPL-SCNC: 97 MMOL/L (ref 98–107)
CHOLEST SERPL-MCNC: 140 MG/DL (ref 0–200)
CK SERPL-CCNC: 92 U/L (ref 20–180)
CO2 SERPL-SCNC: 24.1 MMOL/L (ref 22–29)
CREAT SERPL-MCNC: 0.99 MG/DL (ref 0.57–1)
DEPRECATED RDW RBC AUTO: 45.7 FL (ref 37–54)
EGFRCR SERPLBLD CKD-EPI 2021: 61.5 ML/MIN/1.73
EOSINOPHIL # BLD AUTO: 0.16 10*3/MM3 (ref 0–0.4)
EOSINOPHIL NFR BLD AUTO: 1.8 % (ref 0.3–6.2)
ERYTHROCYTE [DISTWIDTH] IN BLOOD BY AUTOMATED COUNT: 13.3 % (ref 12.3–15.4)
GLOBULIN UR ELPH-MCNC: 3.8 GM/DL
GLUCOSE SERPL-MCNC: 122 MG/DL (ref 65–99)
HBA1C MFR BLD: 5.9 % (ref 4.8–5.6)
HCT VFR BLD AUTO: 38.9 % (ref 34–46.6)
HDLC SERPL-MCNC: 42 MG/DL (ref 40–60)
HGB BLD-MCNC: 12.6 G/DL (ref 12–15.9)
IMM GRANULOCYTES # BLD AUTO: 0.04 10*3/MM3 (ref 0–0.05)
IMM GRANULOCYTES NFR BLD AUTO: 0.5 % (ref 0–0.5)
LDLC SERPL CALC-MCNC: 72 MG/DL (ref 0–100)
LDLC/HDLC SERPL: 1.61 {RATIO}
LYMPHOCYTES # BLD AUTO: 1.58 10*3/MM3 (ref 0.7–3.1)
LYMPHOCYTES NFR BLD AUTO: 17.9 % (ref 19.6–45.3)
MCH RBC QN AUTO: 30 PG (ref 26.6–33)
MCHC RBC AUTO-ENTMCNC: 32.4 G/DL (ref 31.5–35.7)
MCV RBC AUTO: 92.6 FL (ref 79–97)
MONOCYTES # BLD AUTO: 0.59 10*3/MM3 (ref 0.1–0.9)
MONOCYTES NFR BLD AUTO: 6.7 % (ref 5–12)
NEUTROPHILS NFR BLD AUTO: 6.39 10*3/MM3 (ref 1.7–7)
NEUTROPHILS NFR BLD AUTO: 72.3 % (ref 42.7–76)
NRBC BLD AUTO-RTO: 0 /100 WBC (ref 0–0.2)
PLATELET # BLD AUTO: 424 10*3/MM3 (ref 140–450)
PMV BLD AUTO: 10.4 FL (ref 6–12)
POTASSIUM SERPL-SCNC: 4.4 MMOL/L (ref 3.5–5.2)
PROT SERPL-MCNC: 7.7 G/DL (ref 6–8.5)
RBC # BLD AUTO: 4.2 10*6/MM3 (ref 3.77–5.28)
SODIUM SERPL-SCNC: 134 MMOL/L (ref 136–145)
T4 FREE SERPL-MCNC: 1.54 NG/DL (ref 0.92–1.68)
TRIGL SERPL-MCNC: 151 MG/DL (ref 0–150)
TSH SERPL DL<=0.05 MIU/L-ACNC: 5.13 UIU/ML (ref 0.27–4.2)
VLDLC SERPL-MCNC: 26 MG/DL (ref 5–40)
WBC NRBC COR # BLD AUTO: 8.83 10*3/MM3 (ref 3.4–10.8)

## 2024-12-04 PROCEDURE — 84443 ASSAY THYROID STIM HORMONE: CPT

## 2024-12-04 PROCEDURE — 82043 UR ALBUMIN QUANTITATIVE: CPT

## 2024-12-04 PROCEDURE — 82306 VITAMIN D 25 HYDROXY: CPT

## 2024-12-04 PROCEDURE — 80061 LIPID PANEL: CPT

## 2024-12-04 PROCEDURE — 36415 COLL VENOUS BLD VENIPUNCTURE: CPT

## 2024-12-04 PROCEDURE — 82550 ASSAY OF CK (CPK): CPT

## 2024-12-04 PROCEDURE — 84439 ASSAY OF FREE THYROXINE: CPT

## 2024-12-04 PROCEDURE — 85025 COMPLETE CBC W/AUTO DIFF WBC: CPT

## 2024-12-04 PROCEDURE — 83036 HEMOGLOBIN GLYCOSYLATED A1C: CPT

## 2024-12-04 PROCEDURE — 80053 COMPREHEN METABOLIC PANEL: CPT

## 2024-12-04 RX ORDER — LEVOTHYROXINE SODIUM 75 UG/1
75 TABLET ORAL DAILY
Qty: 90 TABLET | Refills: 1 | Status: SHIPPED | OUTPATIENT
Start: 2024-12-04

## 2024-12-04 NOTE — TELEPHONE ENCOUNTER
Patient came by office requested refill on   Levothyroxine 75mcg  says she needs it sent to [facility] pharmacy due to her insurance making her change pharmacys.

## 2024-12-05 ENCOUNTER — TELEPHONE (OUTPATIENT)
Dept: CARDIOLOGY | Facility: CLINIC | Age: 69
End: 2024-12-05
Payer: MEDICARE

## 2024-12-05 NOTE — TELEPHONE ENCOUNTER
----- Message from Evita Rosario sent at 12/5/2024 12:16 PM EST -----  Will discuss the lab work at next office visit however thyroid level is still low.  She can finish off Synthroid 75 mcg daily however we will increase it to 88 at next office visit

## 2024-12-05 NOTE — TELEPHONE ENCOUNTER
CALLED AND GIVEN MESSAGE PER DR ROSARIO.  SHE V/U.        Evita Rosario MD Collins, Sheila D, MA  Will discuss the lab work at next office visit however thyroid level is still low.  She can finish off Synthroid 75 mcg daily however we will increase it to 88 at next office visit

## 2024-12-30 ENCOUNTER — TELEPHONE (OUTPATIENT)
Dept: CARDIOLOGY | Facility: CLINIC | Age: 69
End: 2024-12-30
Payer: MEDICARE

## 2024-12-30 RX ORDER — AMLODIPINE BESYLATE 2.5 MG/1
2.5 TABLET ORAL DAILY
Qty: 30 TABLET | Refills: 11 | Status: SHIPPED | OUTPATIENT
Start: 2024-12-30

## 2024-12-30 NOTE — TELEPHONE ENCOUNTER
Please refill AMLODIPINE 2.5MG to [facility].  Patient has changed pharmacy.

## 2025-01-07 ENCOUNTER — TELEPHONE (OUTPATIENT)
Dept: CARDIOLOGY | Facility: CLINIC | Age: 70
End: 2025-01-07
Payer: MEDICARE

## 2025-01-07 RX ORDER — LEVOTHYROXINE SODIUM 50 UG/1
75 TABLET ORAL DAILY
Qty: 135 TABLET | Refills: 1 | Status: SHIPPED | OUTPATIENT
Start: 2025-01-07

## 2025-01-07 NOTE — TELEPHONE ENCOUNTER
Letter from [facility] sent a letter stating there is a recall on the rx levothyroxine tab 75mcg .    Per Dr Rosario patient is to take 50mcg 1 1/2 tab qd.

## 2025-01-28 ENCOUNTER — TELEPHONE (OUTPATIENT)
Dept: CARDIOLOGY | Facility: CLINIC | Age: 70
End: 2025-01-28
Payer: MEDICARE

## 2025-01-28 RX ORDER — MONTELUKAST SODIUM 10 MG/1
10 TABLET ORAL
Qty: 90 TABLET | Refills: 1 | Status: SHIPPED | OUTPATIENT
Start: 2025-01-28

## 2025-02-04 ENCOUNTER — OFFICE VISIT (OUTPATIENT)
Dept: CARDIOLOGY | Facility: CLINIC | Age: 70
End: 2025-02-04
Payer: MEDICARE

## 2025-02-04 VITALS
DIASTOLIC BLOOD PRESSURE: 70 MMHG | HEIGHT: 63 IN | WEIGHT: 158 LBS | SYSTOLIC BLOOD PRESSURE: 116 MMHG | BODY MASS INDEX: 28 KG/M2

## 2025-02-04 DIAGNOSIS — R11.0 NAUSEA: ICD-10-CM

## 2025-02-04 DIAGNOSIS — E03.8 OTHER SPECIFIED HYPOTHYROIDISM: ICD-10-CM

## 2025-02-04 DIAGNOSIS — E78.2 MIXED HYPERLIPIDEMIA: ICD-10-CM

## 2025-02-04 DIAGNOSIS — E03.9 HYPOTHYROIDISM, UNSPECIFIED TYPE: ICD-10-CM

## 2025-02-04 DIAGNOSIS — I10 PRIMARY HYPERTENSION: Primary | ICD-10-CM

## 2025-02-04 DIAGNOSIS — J43.8 OTHER EMPHYSEMA: ICD-10-CM

## 2025-02-04 DIAGNOSIS — I51.9 DIASTOLIC DYSFUNCTION, LEFT VENTRICLE: ICD-10-CM

## 2025-02-04 DIAGNOSIS — J42 CHRONIC BRONCHITIS, UNSPECIFIED CHRONIC BRONCHITIS TYPE: ICD-10-CM

## 2025-02-04 DIAGNOSIS — I10 ESSENTIAL HYPERTENSION: ICD-10-CM

## 2025-02-04 DIAGNOSIS — E11.9 TYPE 2 DIABETES MELLITUS WITHOUT COMPLICATION, WITHOUT LONG-TERM CURRENT USE OF INSULIN: ICD-10-CM

## 2025-02-04 RX ORDER — ALBUTEROL SULFATE 90 UG/1
2 INHALANT RESPIRATORY (INHALATION) EVERY 4 HOURS PRN
Qty: 18 G | Refills: 5 | Status: SHIPPED | OUTPATIENT
Start: 2025-02-04

## 2025-02-04 RX ORDER — LEVOTHYROXINE SODIUM 88 UG/1
88 TABLET ORAL DAILY
Qty: 90 TABLET | Refills: 1 | Status: SHIPPED | OUTPATIENT
Start: 2025-02-04

## 2025-02-04 RX ORDER — ONDANSETRON 4 MG/1
4 TABLET, ORALLY DISINTEGRATING ORAL DAILY PRN
Qty: 30 TABLET | Refills: 0 | Status: SHIPPED | OUTPATIENT
Start: 2025-02-04

## 2025-02-04 RX ORDER — FLUTICASONE FUROATE AND VILANTEROL 100; 25 UG/1; UG/1
1 POWDER RESPIRATORY (INHALATION)
Qty: 60 EACH | Refills: 12 | Status: SHIPPED | OUTPATIENT
Start: 2025-02-04

## 2025-02-04 NOTE — PROGRESS NOTES
subjective     Chief Complaint   Patient presents with    Follow-up    Med Management       Problems Addressed This Visit  1. Primary hypertension    2. Mixed hyperlipidemia    3. Hypothyroidism, unspecified type    4. Type 2 diabetes mellitus without complication, without long-term current use of insulin    5. Nausea    6. Chronic bronchitis, unspecified chronic bronchitis type    7. Diastolic dysfunction, left ventricle    8. Other specified hypothyroidism    9. Essential hypertension    10. Other emphysema        History of Present Illness    Ada is 69 years old white female who is here for follow-up.  Patient has multiple chronic medical problems however she stated that she is doing very well.  She denies any chest pain palpitations or shortness of breath.  Blood pressure is very well-controlled on current medications.  She is taking metoprolol, losartan Norvasc and Hygroton.    Hyperlipidemia she is on Lipitor therapy without any drug side effects.  Hypothyroidism  She is clinically euthyroid on Synthroid replacement therapy.    She also has diabetes mellitus type 2 and has been on metformin and Tradjenta she is also trying to follow diet.  Other problems include COPD on albuterol Breo Ellipta and is doing well      Past Surgical History:   Procedure Laterality Date    CATARACT EXTRACTION, BILATERAL Bilateral     June and july 2020    CHOLECYSTECTOMY      COLONOSCOPY N/A 6/7/2022    Procedure: COLONOSCOPY FOR SCREENING;  Surgeon: Loren Leigh MD;  Location: [facility] OR;  Service: Gastroenterology;  Laterality: N/A;    COLONOSCOPY W/ BIOPSIES  2012    HYSTERECTOMY      VARICOSE VEIN SURGERY Right      Family History   Problem Relation Age of Onset    Thyroid cancer Mother     Aneurysm Mother     Hypertension Mother     Diabetes Mother     Arthritis Mother     Heart attack Father     Heart disease Father     Diabetes Brother     Kidney failure Brother     Hypertension Brother     Diabetes Brother      Hypertension Brother     Breast cancer Maternal Aunt      Past Medical History:   Diagnosis Date    Arthritis     B12 deficiency     Bronchitis, acute     Chronic pain syndrome     COPD (chronic obstructive pulmonary disease)     Diabetes mellitus     Disease of thyroid gland     Elevated cholesterol     Hyperlipidemia     Hypertension      Patient Active Problem List   Diagnosis    Acute bronchitis    B12 deficiency*    Chronic pain syndrome*    Hypertension*    Hyperlipidemia*    Diabetes mellitus*    Hypothyroidism*    Vitamin D deficiency*    Right ear pain    Sebaceous cyst    Chronic bronchitis    Post-menopausal osteoporosis    Tobacco use    Environmental allergies    Edema leg    Closed fracture of multiple ribs of left side    Chest wall pain    Encounter for screening for malignant neoplasm of colon    Left foot pain    Hearing loss of right ear    Diastolic dysfunction, left ventricle       Social History     Tobacco Use    Smoking status: Every Day     Current packs/day: 0.50     Average packs/day: 0.5 packs/day for 10.0 years (5.0 ttl pk-yrs)     Types: Cigarettes     Passive exposure: Past    Smokeless tobacco: Never   Vaping Use    Vaping status: Never Used   Substance Use Topics    Alcohol use: No    Drug use: No       Allergies   Allergen Reactions    Avelox [Moxifloxacin] Hives     Burning itcing whelps allover       Current Outpatient Medications on File Prior to Visit   Medication Sig    cetirizine (zyrTEC) 10 MG tablet Take 0.5 tablets by mouth Daily.    cholecalciferol (Vitamin D, Cholecalciferol,) 25 MCG (1000 UT) tablet Take 1 tablet by mouth Daily.    Diclofenac Sodium (VOLTAREN) 1 % gel gel Apply 4 g topically to the appropriate area as directed 4 (Four) Times a Day As Needed.    fluticasone (FLONASE) 50 MCG/ACT nasal spray Administer 2 sprays into the nostril(s) as directed by provider Daily.    glucose blood test strip 1 each by Other route Daily.    hydroxychloroquine (PLAQUENIL) 200 MG  tablet Take 1 tablet by mouth Daily.    Lancets (onetouch ultrasoft) lancets USES TWICE A DAY TO CHECK BLOOD SUGAR    montelukast (SINGULAIR) 10 MG tablet Take 1 tablet by mouth every night at bedtime.    Restasis 0.05 % ophthalmic emulsion Administer 1 drop into the left eye 2 (Two) Times a Day.    vitamin B-12 (CYANOCOBALAMIN) 1000 MCG tablet Take 1 tablet by mouth Daily.    [DISCONTINUED] atorvastatin (LIPITOR) 10 MG tablet Take 1 tablet by mouth Daily.    [DISCONTINUED] chlorthalidone (HYGROTON) 25 MG tablet Take 0.5 tablets by mouth Daily.    [DISCONTINUED] linagliptin (TRADJENTA) 5 MG tablet tablet Take 1 tablet by mouth Daily.    [DISCONTINUED] losartan (COZAAR) 50 MG tablet Take 2 tablets by mouth Daily. for blood pressure    [DISCONTINUED] metFORMIN (GLUCOPHAGE) 1000 MG tablet Take 1 tablet by mouth 2 (Two) Times a Day With Meals.    [DISCONTINUED] metoprolol tartrate (LOPRESSOR) 25 MG tablet TAKE ONE TABLET BY MOUTH TWICE DAILY FOR HEART AND BLOOD PRESSURE     No current facility-administered medications on file prior to visit.     (Not in a hospital admission)      Results for orders placed during the hospital encounter of 02/17/22    Adult Transthoracic Echo Complete W/ Cont if Necessary Per Protocol    Interpretation Summary  · Normal left ventricular cavity size and wall thickness noted.  · Left ventricular ejection fraction appears to be 61 - 65%.  · Left ventricular diastolic function is consistent with (grade I) impaired relaxation.  · Estimated right ventricular systolic pressure from tricuspid regurgitation is mildly elevated (35-45 mmHg).  · Aortic valve is a trileaflet valve with no evidence of aortic stenosis or aortic regurgitation.  · Mitral valve appears normal, no evidence of mitral stenosis and mitral regurgitation noted.  · Tricuspid valve appears normal, physiological tricuspid regurgitation is noted with mild pulmonary hypertension. Peak RV systolic pressure is mildly elevated at 38  "mmHg.  · No pulmonic stenosis or regurgitation noted  · No pericardial effusion detected            The following portions of the patient's history were reviewed and updated as appropriate: allergies, current medications, past family history, past medical history, past social history, past surgical history and problem list.    Review of Systems   Constitutional: Negative.   HENT: Negative.  Negative for congestion.    Eyes: Negative.    Cardiovascular: Negative.  Negative for chest pain, cyanosis, dyspnea on exertion, irregular heartbeat, leg swelling, near-syncope, orthopnea, palpitations, paroxysmal nocturnal dyspnea and syncope.   Respiratory: Negative.  Negative for shortness of breath.    Hematologic/Lymphatic: Negative.    Musculoskeletal: Negative.    Gastrointestinal: Negative.    Neurological: Negative.  Negative for headaches.          Objective:     /70 (BP Location: Left arm, Patient Position: Sitting, Cuff Size: Adult)   Ht 160 cm (63\")   Wt 71.7 kg (158 lb)   BMI 27.99 kg/m²   Cardiovascular:      PMI at left midclavicular line. Normal rate. Regular rhythm. Normal S1. Normal S2.       Murmurs: There is no murmur.      No gallop.  No click. No rub.   Pulses:     Intact distal pulses.   Edema:     Peripheral edema absent.           Lab Review  Lab Results   Component Value Date     (L) 12/04/2024    K 4.4 12/04/2024    CL 97 (L) 12/04/2024    BUN 17 12/04/2024    CREATININE 0.99 12/04/2024    GLUCOSE 122 (H) 12/04/2024    CALCIUM 9.4 12/04/2024    ALT 8 12/04/2024    ALKPHOS 71 12/04/2024    LABIL2 1.5 04/28/2016     Lab Results   Component Value Date    CKTOTAL 92 12/04/2024     Lab Results   Component Value Date    WBC 8.83 12/04/2024    HGB 12.6 12/04/2024    HCT 38.9 12/04/2024     12/04/2024     No results found for: \"INR\"  No results found for: \"MG\"  Lab Results   Component Value Date    TSH 5.130 (H) 12/04/2024     No results found for: \"BNP\"  Lab Results   Component Value " Date    CHLPL 184 04/28/2016    CHOL 140 12/04/2024    TRIG 151 (H) 12/04/2024    HDL 42 12/04/2024    VLDL 26 12/04/2024    LDL 72 12/04/2024     Lab Results   Component Value Date    LDL 72 12/04/2024     (H) 06/07/2024     Lab Results   Component Value Date    PROBNP 270.0 01/28/2022               Procedures       I personally viewed and interpreted the patient's LAB data         Assessment:     1. Primary hypertension    2. Mixed hyperlipidemia    3. Hypothyroidism, unspecified type    4. Type 2 diabetes mellitus without complication, without long-term current use of insulin    5. Nausea    6. Chronic bronchitis, unspecified chronic bronchitis type    7. Diastolic dysfunction, left ventricle    8. Other specified hypothyroidism    9. Essential hypertension    10. Other emphysema          Plan:     Hypertension  Blood pressure is very well-controlled.  She is trying to follow diet.  She is also salt restricted.  She was advised to continue Hygroton Lopressor and losartan.  She states that she is not taking Norvasc because blood pressure has been running low.  Norvasc was discontinued however she was advised to check her blood pressure closely.    Hyperlipidemia  She will continue Lipitor, diet restrictions were discussed.  LDL is mildly elevated goal of less than 100 was discussed.    Hypothyroidism  Clinically euthyroid Synthroid was continued.    Diabetes mellitus type 2 metformin and Tradjenta was also continued dietary restriction again emphasized.    Overall she seems doing very well she will continue her breathing medications and also Zyrtec for allergies.  Follow-up scheduled        No follow-ups on file.

## 2025-02-06 RX ORDER — METOPROLOL TARTRATE 25 MG/1
25 TABLET, FILM COATED ORAL 2 TIMES DAILY
Qty: 180 TABLET | Refills: 0 | Status: SHIPPED | OUTPATIENT
Start: 2025-02-06

## 2025-02-06 RX ORDER — ATORVASTATIN CALCIUM 10 MG/1
10 TABLET, FILM COATED ORAL DAILY
Qty: 90 TABLET | Refills: 3 | Status: SHIPPED | OUTPATIENT
Start: 2025-02-06

## 2025-02-06 RX ORDER — LOSARTAN POTASSIUM 50 MG/1
100 TABLET ORAL DAILY
Qty: 180 TABLET | Refills: 2 | Status: SHIPPED | OUTPATIENT
Start: 2025-02-06

## 2025-02-06 RX ORDER — CHLORTHALIDONE 25 MG/1
12.5 TABLET ORAL DAILY
Qty: 45 TABLET | Refills: 1 | Status: SHIPPED | OUTPATIENT
Start: 2025-02-06

## 2025-02-25 RX ORDER — MONTELUKAST SODIUM 10 MG/1
10 TABLET ORAL
Qty: 90 TABLET | Refills: 1 | Status: SHIPPED | OUTPATIENT
Start: 2025-02-25

## 2025-03-24 ENCOUNTER — LAB (OUTPATIENT)
Dept: FAMILY MEDICINE CLINIC | Facility: CLINIC | Age: 70
End: 2025-03-24
Payer: MEDICARE

## 2025-03-24 ENCOUNTER — OFFICE VISIT (OUTPATIENT)
Dept: FAMILY MEDICINE CLINIC | Facility: CLINIC | Age: 70
End: 2025-03-24
Payer: MEDICARE

## 2025-03-24 VITALS
WEIGHT: 160.4 LBS | HEART RATE: 73 BPM | HEIGHT: 63 IN | SYSTOLIC BLOOD PRESSURE: 108 MMHG | BODY MASS INDEX: 28.42 KG/M2 | DIASTOLIC BLOOD PRESSURE: 66 MMHG | TEMPERATURE: 96.8 F | OXYGEN SATURATION: 95 % | RESPIRATION RATE: 16 BRPM

## 2025-03-24 DIAGNOSIS — E78.2 MIXED HYPERLIPIDEMIA: ICD-10-CM

## 2025-03-24 DIAGNOSIS — E11.9 TYPE 2 DIABETES MELLITUS WITHOUT COMPLICATION, WITHOUT LONG-TERM CURRENT USE OF INSULIN: Primary | ICD-10-CM

## 2025-03-24 DIAGNOSIS — J44.1 COPD WITH EXACERBATION: ICD-10-CM

## 2025-03-24 DIAGNOSIS — F17.200 TOBACCO DEPENDENCE: ICD-10-CM

## 2025-03-24 DIAGNOSIS — Z87.891 PERSONAL HISTORY OF NICOTINE DEPENDENCE: ICD-10-CM

## 2025-03-24 DIAGNOSIS — E03.9 HYPOTHYROIDISM, UNSPECIFIED TYPE: ICD-10-CM

## 2025-03-24 DIAGNOSIS — E11.9 TYPE 2 DIABETES MELLITUS WITHOUT COMPLICATION, WITHOUT LONG-TERM CURRENT USE OF INSULIN: ICD-10-CM

## 2025-03-24 PROCEDURE — 36415 COLL VENOUS BLD VENIPUNCTURE: CPT

## 2025-03-24 PROCEDURE — 82043 UR ALBUMIN QUANTITATIVE: CPT | Performed by: FAMILY MEDICINE

## 2025-03-24 PROCEDURE — 84439 ASSAY OF FREE THYROXINE: CPT | Performed by: FAMILY MEDICINE

## 2025-03-24 PROCEDURE — 84443 ASSAY THYROID STIM HORMONE: CPT | Performed by: FAMILY MEDICINE

## 2025-03-24 PROCEDURE — 80053 COMPREHEN METABOLIC PANEL: CPT | Performed by: FAMILY MEDICINE

## 2025-03-24 PROCEDURE — 83036 HEMOGLOBIN GLYCOSYLATED A1C: CPT | Performed by: FAMILY MEDICINE

## 2025-03-24 PROCEDURE — 82570 ASSAY OF URINE CREATININE: CPT | Performed by: FAMILY MEDICINE

## 2025-03-24 RX ORDER — ACETAMINOPHEN 500 MG
1000 TABLET ORAL EVERY 6 HOURS PRN
COMMUNITY

## 2025-03-24 RX ORDER — AZITHROMYCIN 250 MG/1
TABLET, FILM COATED ORAL
Qty: 6 TABLET | Refills: 0 | Status: SHIPPED | OUTPATIENT
Start: 2025-03-24

## 2025-03-25 ENCOUNTER — PATIENT ROUNDING (BHMG ONLY) (OUTPATIENT)
Dept: FAMILY MEDICINE CLINIC | Facility: CLINIC | Age: 70
End: 2025-03-25
Payer: MEDICARE

## 2025-03-25 LAB
ALBUMIN SERPL-MCNC: 4.3 G/DL (ref 3.5–5.2)
ALBUMIN UR-MCNC: 3.8 MG/DL
ALBUMIN/GLOB SERPL: 1.3 G/DL
ALP SERPL-CCNC: 73 U/L (ref 39–117)
ALT SERPL W P-5'-P-CCNC: 9 U/L (ref 1–33)
ANION GAP SERPL CALCULATED.3IONS-SCNC: 13.2 MMOL/L (ref 5–15)
AST SERPL-CCNC: 11 U/L (ref 1–32)
BILIRUB SERPL-MCNC: 0.3 MG/DL (ref 0–1.2)
BUN SERPL-MCNC: 16 MG/DL (ref 8–23)
BUN/CREAT SERPL: 16.5 (ref 7–25)
CALCIUM SPEC-SCNC: 9.5 MG/DL (ref 8.6–10.5)
CHLORIDE SERPL-SCNC: 95 MMOL/L (ref 98–107)
CO2 SERPL-SCNC: 26.8 MMOL/L (ref 22–29)
CREAT SERPL-MCNC: 0.97 MG/DL (ref 0.57–1)
CREAT UR-MCNC: 120.2 MG/DL
EGFRCR SERPLBLD CKD-EPI 2021: 63 ML/MIN/1.73
GLOBULIN UR ELPH-MCNC: 3.3 GM/DL
GLUCOSE SERPL-MCNC: 87 MG/DL (ref 65–99)
HBA1C MFR BLD: 6.2 % (ref 4.8–5.6)
MICROALBUMIN/CREAT UR: 31.6 MG/G (ref 0–29)
POTASSIUM SERPL-SCNC: 4.8 MMOL/L (ref 3.5–5.2)
PROT SERPL-MCNC: 7.6 G/DL (ref 6–8.5)
SODIUM SERPL-SCNC: 135 MMOL/L (ref 136–145)
T4 FREE SERPL-MCNC: 1.82 NG/DL (ref 0.92–1.68)
TSH SERPL DL<=0.05 MIU/L-ACNC: 1.55 UIU/ML (ref 0.27–4.2)

## 2025-03-25 NOTE — PROGRESS NOTES
March 25, 2025    Hello, may I speak with Leonarda Diaz?    My name is   Khadra    I am  with [facility] [place] [facility]  [facility]  [address] [place] [address] [phone].    Before we get started may I verify your date of birth? 1955 YES     I am calling to officially welcome you to our practice and ask about your recent visit. Is this a good time to talk? YES     Tell me about your visit with us. What things went well?  the visit went great, very happy with        We're always looking for ways to make our patients' experiences even better. Do you have recommendations on ways we may improve?  NO     Overall were you satisfied with your first visit to our practice?   YES     I appreciate you taking the time to speak with me today. Is there anything else I can do for you?   NO     Thank you, and have a great day.

## 2025-03-26 DIAGNOSIS — J42 CHRONIC BRONCHITIS, UNSPECIFIED CHRONIC BRONCHITIS TYPE: ICD-10-CM

## 2025-03-26 DIAGNOSIS — E11.9 TYPE 2 DIABETES MELLITUS WITHOUT COMPLICATION, WITHOUT LONG-TERM CURRENT USE OF INSULIN: ICD-10-CM

## 2025-03-26 DIAGNOSIS — I10 ESSENTIAL HYPERTENSION: ICD-10-CM

## 2025-03-26 DIAGNOSIS — E78.2 MIXED HYPERLIPIDEMIA: ICD-10-CM

## 2025-03-26 DIAGNOSIS — R11.0 NAUSEA: ICD-10-CM

## 2025-03-26 DIAGNOSIS — E03.9 HYPOTHYROIDISM, UNSPECIFIED TYPE: ICD-10-CM

## 2025-03-26 RX ORDER — LEVOTHYROXINE SODIUM 88 UG/1
88 TABLET ORAL DAILY
Qty: 90 TABLET | Refills: 1 | OUTPATIENT
Start: 2025-03-26

## 2025-03-26 RX ORDER — MONTELUKAST SODIUM 10 MG/1
10 TABLET ORAL
Qty: 90 TABLET | Refills: 1 | OUTPATIENT
Start: 2025-03-26

## 2025-03-26 NOTE — TELEPHONE ENCOUNTER
Caller: Leonarda Diaz    Relationship: Self    Best call back number: [phone]     Requested Prescriptions:   Requested Prescriptions     Pending Prescriptions Disp Refills    metoprolol tartrate (LOPRESSOR) 25 MG tablet 180 tablet 0     Sig: Take 1 tablet by mouth 2 (Two) Times a Day.    losartan (COZAAR) 50 MG tablet 180 tablet 2     Sig: Take 2 tablets by mouth Daily. for blood pressure    metFORMIN (GLUCOPHAGE) 1000 MG tablet 180 tablet 3     Sig: Take 1 tablet by mouth 2 (Two) Times a Day With Meals.    linagliptin (TRADJENTA) 5 MG tablet tablet 90 tablet 1     Sig: Take 1 tablet by mouth Daily.    levothyroxine (Synthroid) 88 MCG tablet 90 tablet 1     Sig: Take 1 tablet by mouth Daily.    ondansetron ODT (ZOFRAN-ODT) 4 MG disintegrating tablet 30 tablet 0     Sig: Place 1 tablet on the tongue Daily As Needed for Nausea or Vomiting.    montelukast (SINGULAIR) 10 MG tablet 90 tablet 1     Sig: Take 1 tablet by mouth every night at bedtime.    atorvastatin (LIPITOR) 10 MG tablet 90 tablet 3     Sig: Take 1 tablet by mouth Daily.        Pharmacy where request should be sent: [place], [place] - [address] [facility] [address] - [phone]  - [phone] FX     Last office visit with prescribing clinician: 3/24/2025   Last telemedicine visit with prescribing clinician: Visit date not found   Next office visit with prescribing clinician: 6/25/2025

## 2025-03-26 NOTE — TELEPHONE ENCOUNTER
Caller: JUNG, [facility] - [place], [place] - [address] [address] - [phone] [facility] [phone] FX    Relationship: Pharmacy    Best call back number: [phone]     Requested Prescriptions:   Requested Prescriptions     Pending Prescriptions Disp Refills    chlorthalidone (HYGROTON) 25 MG tablet 45 tablet 1     Sig: Take 0.5 tablets by mouth Daily.        Pharmacy where request should be sent: [place], [place] - [address] [facility] [address] - [phone] [facility] [phone] FX     Last office visit with prescribing clinician: 3/24/2025   Last telemedicine visit with prescribing clinician: Visit date not found   Next office visit with prescribing clinician: 6/25/2025     Additional details provided by patient: PATIENT IS COMPLETELY OUT OF MEDICATION.    Does the patient have less than a 3 day supply:  [x] Yes  [] No    Would you like a call back once the refill request has been completed: [] Yes [x] No    If the office needs to give you a call back, can they leave a voicemail: [] Yes [x] No    Jared Diaz Rep   03/26/25 09:46 EDT

## 2025-03-27 RX ORDER — ONDANSETRON 4 MG/1
4 TABLET, ORALLY DISINTEGRATING ORAL DAILY PRN
Qty: 90 TABLET | Refills: 1 | Status: SHIPPED | OUTPATIENT
Start: 2025-03-27

## 2025-03-27 RX ORDER — ATORVASTATIN CALCIUM 10 MG/1
10 TABLET, FILM COATED ORAL DAILY
Qty: 90 TABLET | Refills: 1 | Status: SHIPPED | OUTPATIENT
Start: 2025-03-27

## 2025-03-27 RX ORDER — CHLORTHALIDONE 25 MG/1
12.5 TABLET ORAL DAILY
Qty: 45 TABLET | Refills: 1 | Status: SHIPPED | OUTPATIENT
Start: 2025-03-27

## 2025-03-27 RX ORDER — METOPROLOL TARTRATE 25 MG/1
25 TABLET, FILM COATED ORAL 2 TIMES DAILY
Qty: 180 TABLET | Refills: 1 | Status: SHIPPED | OUTPATIENT
Start: 2025-03-27

## 2025-03-27 RX ORDER — LOSARTAN POTASSIUM 50 MG/1
100 TABLET ORAL DAILY
Qty: 180 TABLET | Refills: 1 | Status: SHIPPED | OUTPATIENT
Start: 2025-03-27

## 2025-04-08 NOTE — PROGRESS NOTES
"Leonarda HerediaJoe     VITALS: Blood pressure 108/66, pulse 73, temperature 96.8 °F (36 °C), temperature source Temporal, resp. rate 16, height 160 cm (63\"), weight 72.8 kg (160 lb 6.4 oz), SpO2 95%.    Subjective  Chief Complaint  Establish Care, Diabetes, Hypothyroidism, Lupus, and Allergies    Subjective          History of Present Illness:    History of Present Illness  The patient is a 69-year-old female with medical conditions significant for hypothyroidism, type 2 diabetes, hypertension, hyperlipidemia, and COPD who presents to the clinic for establishment of care.    She reports experiencing severe allergies, which have been present for approximately 2 to 3 weeks. She experiences severe congestion at night, which is less severe during the day. She also reports intermittent coughing. She has been using 2 inhalers, Breo and albuterol, which provide minimal relief. She administers 2 puffs of albuterol every 4 hours. She has previously been prescribed a Z-Lukasz and prednisone by Dr. Strange, but notes that steroids significantly disrupt her blood sugar levels. It has been some time since her last steroid treatment. She has been on Singulair, prescribed by Dr. Strange.    Her lupus appears to be stable, as there have been no recent changes in her medication regimen. She experiences mild joint pain, which she attributes to arthritis. She is currently on Plaquenil and under the care of Dr. Viramontes.    She is due for an eye exam in August 2025. She has not discussed lung cancer screening with Dr. Strange. She has had a mammogram and colonoscopy done.    Her blood glucose levels are well-controlled with her current medication regimen.    SOCIAL HISTORY  The patient admits to smoking.      No complaints regarding medications.     The following portions of the patient's history were reviewed and updated as appropriate: allergies, current medications, past family history, past medical history, past social history, past surgical " history and problem list.    Past Medical History  Past Medical History:   Diagnosis Date    Arthritis     B12 deficiency     Bronchitis, acute     Chronic pain syndrome     COPD (chronic obstructive pulmonary disease)     Diabetes mellitus     Disease of thyroid gland     Elevated cholesterol     Hyperlipidemia     Hypertension     Osteoporosis     Rheumatic fever     Thyroid disease        Surgical History  Past Surgical History:   Procedure Laterality Date    CATARACT EXTRACTION, BILATERAL Bilateral     June and july 2020    CHOLECYSTECTOMY      COLONOSCOPY N/A 06/07/2022    Procedure: COLONOSCOPY FOR SCREENING;  Surgeon: Loren Leigh MD;  Location: [facility];  Service: Gastroenterology;  Laterality: N/A;    COLONOSCOPY W/ BIOPSIES  2012    HYSTERECTOMY      TUBAL ABDOMINAL LIGATION      VARICOSE VEIN SURGERY Right        Family History  Family History   Problem Relation Age of Onset    Stroke Mother     Asthma Mother     Thyroid cancer Mother     Aneurysm Mother     Hypertension Mother     Diabetes Mother     Arthritis Mother     Stroke Father     Kidney disease Father     Hypertension Father     Diabetes Father     Arthritis Father     Heart attack Father     Heart disease Father     Kidney disease Brother     Diabetes Brother     Kidney failure Brother     Hypertension Brother     Diabetes Brother     Hypertension Brother     Heart disease Brother     Hypertension Brother     Diabetes Brother     Breast cancer Maternal Aunt        Social History  Social History     Socioeconomic History    Marital status:      Spouse name: Mahad   Tobacco Use    Smoking status: Every Day     Current packs/day: 0.50     Average packs/day: 0.5 packs/day for 45.3 years (22.6 ttl pk-yrs)     Types: Cigarettes     Start date: 1980     Passive exposure: Past    Smokeless tobacco: Never   Vaping Use    Vaping status: Never Used   Substance and Sexual Activity    Alcohol use: No    Drug use: No    Sexual activity:  "Defer       Objective   Vital Signs:   /66 (BP Location: Right arm, Patient Position: Sitting, Cuff Size: Adult)   Pulse 73   Temp 96.8 °F (36 °C) (Temporal)   Resp 16   Ht 160 cm (63\")   Wt 72.8 kg (160 lb 6.4 oz)   SpO2 95%   BMI 28.41 kg/m²       Physical Exam     Physical Exam  Heart sounds normal.    Gen: Patient in NAD. Pleasant and answers appropriately. A&Ox3.  Resting tremor.    Skin: Warm and dry with normal turgor. No purpura, rashes, or unusual pigmentation noted. Hair is normal in appearance and distribution.    HEENT: NC/AT. No lesions noted. Conjunctiva clear, sclera nonicteric. PERRL. EOMI without nystagmus or strabismus. Fundi appear benign. No hemorrhages or exudates of eyes. Auditory canals are patent bilaterally without lesions. TMs intact,  nonerythematous, bulging without lesions. Nasal mucosa erythematous, and nonedematous. Frontal and maxillary sinuses are nontender. O/P erythematous and moist without exudate.    Neck: Supple without lymph nodes palpated.  Decreased ROM. No carotid bruits appreciated bilaterally.    Lungs: Coarse B/L without rales, rhonchi, crackles, or wheezes.    Heart: RRR. S1 and S2 normal. No S3 or S4. No MRGT.    Abd: Soft, nontender,nondistended. (+)BSx4 quadrants.     Extrem: No CCE. Radial pulses 2+/4 and equal B/L. FROMx4.  Positive joint tenderness noted.    Neuro: No focal motor/sensory deficits.    Procedures    Result Review :   The following data was reviewed by: Cassie Rapp MD on 03/24/2025:       Results  Laboratory Studies  The 10-year ASCVD risk score (Ibeth ADAM, et al., 2019) is: 25%    Values used to calculate the score:      Age: 69 years      Sex: Female      Is Non- : No      Diabetic: Yes      Tobacco smoker: Yes      Systolic Blood Pressure: 108 mmHg      Is BP treated: Yes      HDL Cholesterol: 42 mg/dL      Total Cholesterol: 140 mg/dL             Assessment and Plan      Leonarda Joe is a 69 y.o. here for " medical followup.    Diagnoses and all orders for this visit:    1. Type 2 diabetes mellitus without complication, without long-term current use of insulin (Primary)  -     Comprehensive Metabolic Panel; Future  -     Hemoglobin A1c; Future  -     Microalbumin / Creatinine Urine Ratio - Urine, Clean Catch; Future    2. Mixed hyperlipidemia  -     Comprehensive Metabolic Panel; Future    3. Hypothyroidism, unspecified type  -     Comprehensive Metabolic Panel; Future  -     T4, Free; Future  -     TSH; Future    4. COPD with exacerbation  -     Comprehensive Metabolic Panel; Future  -     azithromycin (Zithromax Z-Lukasz) 250 MG tablet; Take 2 tablets the first day, then 1 tablet daily for 4 days.  Dispense: 6 tablet; Refill: 0    5. Tobacco dependence  -     Comprehensive Metabolic Panel; Future  -      CT Chest Low Dose Cancer Screening WO; Future    6. Personal history of nicotine dependence  -      CT Chest Low Dose Cancer Screening WO; Future    Other orders  -     LABS SCANNED        Assessment & Plan  1. Chronic Obstructive Pulmonary Disease (COPD).  She reports significant congestion and coughing, particularly at night, despite using Breo and albuterol inhalers. A prescription for a Z-Lukasz will be provided. Samples of two alternative inhalers will be given to assess their effectiveness compared to Breo. If her condition worsens, the use of prednisone will be considered.    2. Lupus.  She is currently stable on Plaquenil and reports no significant joint pain. She will continue her current medication regimen. She will inform if there are any issues with insurance coverage for her medications.    3. Type 2 Diabetes Mellitus.  Her blood glucose levels are well-controlled with her current medication regimen. She will continue her current diabetes management plan. Labs will be checked today to monitor her diabetes.    4. Health Maintenance.  Her cholesterol levels are within the normal range. Her mammogram results were  satisfactory. She has undergone a bone density scan in the past. Her last chest x-ray was conducted in 2022, and a CT scan performed in 2014 yielded normal results. A lung cancer screening will be scheduled due to her smoking history. Laboratory tests will be ordered today to monitor her thyroid function and diabetes.      BMI is >= 25 and <30. (Overweight) The following options were offered after discussion;: exercise counseling/recommendations and nutrition counseling/recommendations       Leonarda Diaz  reports that she has been smoking cigarettes. She started smoking about 45 years ago. She has a 22.6 pack-year smoking history. She has been exposed to tobacco smoke. She has never used smokeless tobacco. I have educated her on the risk of diseases from using tobacco products such as cancer, COPD, and heart disease.     I advised her to quit and she is not willing to quit.    I spent 3  minutes counseling the patient.            Patient or patient representative verbalized consent for the use of Ambient Listening during the visit with  Cassie Rapp MD for chart documentation. 4/7/2025  23:53 EDT        Follow Up   Return in about 3 months (around 6/24/2025), or (NIK Viramontes - last note rheum in [place]) LABS (make next appt [facility]).  Findings and plans discussed with patient who verbalizes understanding and agreement. Will followup with patient once results are in. Patient was given instructions and counseling regarding her condition or for health maintenance advice. Please see specific information pulled into the AVS if appropriate.       Cassie Rapp MD

## 2025-04-10 ENCOUNTER — HOSPITAL ENCOUNTER (OUTPATIENT)
Dept: CT IMAGING | Facility: HOSPITAL | Age: 70
Discharge: HOME | End: 2025-04-10
Admitting: FAMILY MEDICINE
Payer: MEDICARE

## 2025-04-10 DIAGNOSIS — Z87.891 PERSONAL HISTORY OF NICOTINE DEPENDENCE: ICD-10-CM

## 2025-04-10 DIAGNOSIS — F17.200 TOBACCO DEPENDENCE: ICD-10-CM

## 2025-04-10 PROCEDURE — 71271 CT THORAX LUNG CANCER SCR C-: CPT

## 2025-04-30 RX ORDER — DOXYCYCLINE 100 MG/1
200 CAPSULE ORAL DAILY
Qty: 2 CAPSULE | Refills: 0 | Status: SHIPPED | OUTPATIENT
Start: 2025-04-30

## 2025-06-25 ENCOUNTER — OFFICE VISIT (OUTPATIENT)
Dept: FAMILY MEDICINE CLINIC | Facility: CLINIC | Age: 70
End: 2025-06-25
Payer: MEDICARE

## 2025-06-25 ENCOUNTER — LAB (OUTPATIENT)
Dept: FAMILY MEDICINE CLINIC | Facility: CLINIC | Age: 70
End: 2025-06-25
Payer: MEDICARE

## 2025-06-25 VITALS
HEART RATE: 80 BPM | WEIGHT: 158.6 LBS | DIASTOLIC BLOOD PRESSURE: 70 MMHG | BODY MASS INDEX: 28.1 KG/M2 | TEMPERATURE: 96.4 F | OXYGEN SATURATION: 95 % | SYSTOLIC BLOOD PRESSURE: 122 MMHG | HEIGHT: 63 IN

## 2025-06-25 DIAGNOSIS — R30.0 DYSURIA: ICD-10-CM

## 2025-06-25 DIAGNOSIS — Z00.00 ENCOUNTER FOR SUBSEQUENT ANNUAL WELLNESS VISIT IN MEDICARE PATIENT: Primary | ICD-10-CM

## 2025-06-25 DIAGNOSIS — J30.2 SEASONAL ALLERGIC RHINITIS, UNSPECIFIED TRIGGER: ICD-10-CM

## 2025-06-25 DIAGNOSIS — E03.9 HYPOTHYROIDISM, UNSPECIFIED TYPE: ICD-10-CM

## 2025-06-25 DIAGNOSIS — I10 ESSENTIAL HYPERTENSION: ICD-10-CM

## 2025-06-25 DIAGNOSIS — E11.9 TYPE 2 DIABETES MELLITUS WITHOUT COMPLICATION, WITHOUT LONG-TERM CURRENT USE OF INSULIN: ICD-10-CM

## 2025-06-25 DIAGNOSIS — E78.2 MIXED HYPERLIPIDEMIA: ICD-10-CM

## 2025-06-25 LAB
BILIRUB BLD-MCNC: NEGATIVE MG/DL
CLARITY, POC: ABNORMAL
COLOR UR: YELLOW
EXPIRATION DATE: ABNORMAL
GLUCOSE UR STRIP-MCNC: NEGATIVE MG/DL
KETONES UR QL: NEGATIVE
LEUKOCYTE EST, POC: ABNORMAL
Lab: ABNORMAL
NITRITE UR-MCNC: POSITIVE MG/ML
PH UR: 6 [PH] (ref 5–8)
PROT UR STRIP-MCNC: ABNORMAL MG/DL
RBC # UR STRIP: ABNORMAL /UL
SP GR UR: 1.02 (ref 1–1.03)
UROBILINOGEN UR QL: NORMAL

## 2025-06-25 PROCEDURE — 87186 SC STD MICRODIL/AGAR DIL: CPT | Performed by: FAMILY MEDICINE

## 2025-06-25 PROCEDURE — 84443 ASSAY THYROID STIM HORMONE: CPT | Performed by: FAMILY MEDICINE

## 2025-06-25 PROCEDURE — 3074F SYST BP LT 130 MM HG: CPT | Performed by: FAMILY MEDICINE

## 2025-06-25 PROCEDURE — 3078F DIAST BP <80 MM HG: CPT | Performed by: FAMILY MEDICINE

## 2025-06-25 PROCEDURE — 83036 HEMOGLOBIN GLYCOSYLATED A1C: CPT | Performed by: FAMILY MEDICINE

## 2025-06-25 PROCEDURE — 3044F HG A1C LEVEL LT 7.0%: CPT | Performed by: FAMILY MEDICINE

## 2025-06-25 PROCEDURE — G0439 PPPS, SUBSEQ VISIT: HCPCS | Performed by: FAMILY MEDICINE

## 2025-06-25 PROCEDURE — 1126F AMNT PAIN NOTED NONE PRSNT: CPT | Performed by: FAMILY MEDICINE

## 2025-06-25 PROCEDURE — 1160F RVW MEDS BY RX/DR IN RCRD: CPT | Performed by: FAMILY MEDICINE

## 2025-06-25 PROCEDURE — 84439 ASSAY OF FREE THYROXINE: CPT | Performed by: FAMILY MEDICINE

## 2025-06-25 PROCEDURE — 87086 URINE CULTURE/COLONY COUNT: CPT | Performed by: FAMILY MEDICINE

## 2025-06-25 PROCEDURE — 80053 COMPREHEN METABOLIC PANEL: CPT | Performed by: FAMILY MEDICINE

## 2025-06-25 PROCEDURE — 81003 URINALYSIS AUTO W/O SCOPE: CPT | Performed by: FAMILY MEDICINE

## 2025-06-25 PROCEDURE — 87077 CULTURE AEROBIC IDENTIFY: CPT | Performed by: FAMILY MEDICINE

## 2025-06-25 PROCEDURE — 1159F MED LIST DOCD IN RCRD: CPT | Performed by: FAMILY MEDICINE

## 2025-06-25 RX ORDER — METOPROLOL TARTRATE 25 MG/1
25 TABLET, FILM COATED ORAL 2 TIMES DAILY
Qty: 180 TABLET | Refills: 1 | Status: SHIPPED | OUTPATIENT
Start: 2025-06-25

## 2025-06-25 RX ORDER — CHLORTHALIDONE 25 MG/1
12.5 TABLET ORAL DAILY
Qty: 45 TABLET | Refills: 1 | Status: SHIPPED | OUTPATIENT
Start: 2025-06-25

## 2025-06-25 RX ORDER — LEVOTHYROXINE SODIUM 88 UG/1
88 TABLET ORAL DAILY
Qty: 90 TABLET | Refills: 1 | Status: SHIPPED | OUTPATIENT
Start: 2025-06-25

## 2025-06-25 RX ORDER — MONTELUKAST SODIUM 10 MG/1
10 TABLET ORAL
Qty: 90 TABLET | Refills: 1 | Status: SHIPPED | OUTPATIENT
Start: 2025-06-25

## 2025-06-25 RX ORDER — SULFAMETHOXAZOLE AND TRIMETHOPRIM 800; 160 MG/1; MG/1
1 TABLET ORAL 2 TIMES DAILY
Qty: 6 TABLET | Refills: 0 | Status: SHIPPED | OUTPATIENT
Start: 2025-06-25

## 2025-06-25 RX ORDER — ATORVASTATIN CALCIUM 10 MG/1
10 TABLET, FILM COATED ORAL DAILY
Qty: 90 TABLET | Refills: 1 | Status: SHIPPED | OUTPATIENT
Start: 2025-06-25

## 2025-06-25 RX ORDER — LOSARTAN POTASSIUM 50 MG/1
100 TABLET ORAL DAILY
Qty: 180 TABLET | Refills: 1 | Status: SHIPPED | OUTPATIENT
Start: 2025-06-25

## 2025-06-26 LAB
ALBUMIN SERPL-MCNC: 4.3 G/DL (ref 3.5–5.2)
ALBUMIN/GLOB SERPL: 1.3 G/DL
ALP SERPL-CCNC: 74 U/L (ref 39–117)
ALT SERPL W P-5'-P-CCNC: 7 U/L (ref 1–33)
ANION GAP SERPL CALCULATED.3IONS-SCNC: 11.5 MMOL/L (ref 5–15)
AST SERPL-CCNC: 11 U/L (ref 1–32)
BILIRUB SERPL-MCNC: 0.4 MG/DL (ref 0–1.2)
BUN SERPL-MCNC: 14 MG/DL (ref 8–23)
BUN/CREAT SERPL: 14.6 (ref 7–25)
CALCIUM SPEC-SCNC: 9.6 MG/DL (ref 8.6–10.5)
CHLORIDE SERPL-SCNC: 97 MMOL/L (ref 98–107)
CO2 SERPL-SCNC: 27.5 MMOL/L (ref 22–29)
CREAT SERPL-MCNC: 0.96 MG/DL (ref 0.57–1)
EGFRCR SERPLBLD CKD-EPI 2021: 63.4 ML/MIN/1.73
GLOBULIN UR ELPH-MCNC: 3.2 GM/DL
GLUCOSE SERPL-MCNC: 96 MG/DL (ref 65–99)
HBA1C MFR BLD: 6.1 % (ref 4.8–5.6)
POTASSIUM SERPL-SCNC: 4.9 MMOL/L (ref 3.5–5.2)
PROT SERPL-MCNC: 7.5 G/DL (ref 6–8.5)
SODIUM SERPL-SCNC: 136 MMOL/L (ref 136–145)
T4 FREE SERPL-MCNC: 1.7 NG/DL (ref 0.92–1.68)
TSH SERPL DL<=0.05 MIU/L-ACNC: 1.31 UIU/ML (ref 0.27–4.2)

## 2025-06-27 LAB — BACTERIA SPEC AEROBE CULT: ABNORMAL

## 2025-07-10 NOTE — ASSESSMENT & PLAN NOTE
{Hyperlipidemia A/P Block (Optional):[id number]}    Orders:    atorvastatin (LIPITOR) 10 MG tablet; Take 1 tablet by mouth Daily.    Comprehensive Metabolic Panel; Future

## 2025-07-10 NOTE — ASSESSMENT & PLAN NOTE
Orders:    levothyroxine (Synthroid) 88 MCG tablet; Take 1 tablet by mouth Daily.    Comprehensive Metabolic Panel; Future    T4, Free; Future    TSH; Future

## 2025-07-10 NOTE — ASSESSMENT & PLAN NOTE
{Diabetes (Optional):5321608656}    Orders:    linagliptin (TRADJENTA) 5 MG tablet tablet; Take 1 tablet by mouth Daily.    metFORMIN (GLUCOPHAGE) 1000 MG tablet; Take 1 tablet by mouth 2 (Two) Times a Day With Meals.    Comprehensive Metabolic Panel; Future    Hemoglobin A1c; Future

## 2025-07-10 NOTE — PROGRESS NOTES
Subjective   The ABCs of the Annual Wellness Visit  Medicare Wellness Visit      Leonarda Diaz is a 70 y.o. patient with medical conditions significant for hypothyroidism, hypertension, hyperlipidemia, type 2 diabetes, and chronic pain who presents for a Medicare Wellness Visit.    The following portions of the patient's history were reviewed and   updated as appropriate: allergies, current medications, past family history, past medical history, past social history, past surgical history, and problem list.    Compared to one year ago, the patient's physical   health is the same.  Compared to one year ago, the patient's mental   health is the same.    Recent Hospitalizations:  She was not admitted to the hospital during the last year.     Current Medical Providers:  Patient Care Team:  Cassie Rpap MD as PCP - General (Family Medicine)  Billy Gray PA as Physician Assistant (Orthopedic Surgery)  Evita Rosario MD as Consulting Physician (Cardiology)    Outpatient Medications Prior to Visit   Medication Sig Dispense Refill    acetaminophen (TYLENOL) 500 MG tablet Take 2 tablets by mouth Every 6 (Six) Hours As Needed for Mild Pain.      albuterol sulfate  (90 Base) MCG/ACT inhaler Inhale 2 puffs Every 4 (Four) Hours As Needed for Wheezing. 18 g 5    cholecalciferol (Vitamin D, Cholecalciferol,) 25 MCG (1000 UT) tablet Take 1 tablet by mouth Daily. 90 tablet 1    fluticasone (FLONASE) 50 MCG/ACT nasal spray Administer 2 sprays into the nostril(s) as directed by provider Daily.      Fluticasone Furoate-Vilanterol (Breo Ellipta) 100-25 MCG/ACT aerosol powder  Inhale 1 puff Daily. 60 each 12    glucose blood test strip 1 each by Other route Daily. 100 each 2    hydroxychloroquine (PLAQUENIL) 200 MG tablet Take 1 tablet by mouth Daily.      Lancets (onetouch ultrasoft) lancets USES TWICE A DAY TO CHECK BLOOD SUGAR 50 each 2    ondansetron ODT (ZOFRAN-ODT) 4 MG disintegrating tablet Place 1 tablet on  the tongue Daily As Needed for Nausea or Vomiting. 90 tablet 1    Restasis 0.05 % ophthalmic emulsion Administer 1 drop into the left eye 2 (Two) Times a Day.      vitamin B-12 (CYANOCOBALAMIN) 1000 MCG tablet Take 1 tablet by mouth Daily.      atorvastatin (LIPITOR) 10 MG tablet Take 1 tablet by mouth Daily. 90 tablet 1    chlorthalidone (HYGROTON) 25 MG tablet Take 0.5 tablets by mouth Daily. 45 tablet 1    doxycycline (VIBRAMYCIN) 100 MG capsule Take 2 capsules by mouth Daily. 2 capsule 0    levothyroxine (Synthroid) 88 MCG tablet Take 1 tablet by mouth Daily. 90 tablet 1    linagliptin (TRADJENTA) 5 MG tablet tablet Take 1 tablet by mouth Daily. 90 tablet 1    losartan (COZAAR) 50 MG tablet Take 2 tablets by mouth Daily. for blood pressure 180 tablet 1    metFORMIN (GLUCOPHAGE) 1000 MG tablet Take 1 tablet by mouth 2 (Two) Times a Day With Meals. 180 tablet 1    metoprolol tartrate (LOPRESSOR) 25 MG tablet Take 1 tablet by mouth 2 (Two) Times a Day. 180 tablet 1    montelukast (SINGULAIR) 10 MG tablet Take 1 tablet by mouth every night at bedtime. 90 tablet 1    azithromycin (Zithromax Z-Lukasz) 250 MG tablet Take 2 tablets the first day, then 1 tablet daily for 4 days. (Patient not taking: Reported on 6/25/2025) 6 tablet 0     No facility-administered medications prior to visit.     No opioid medication identified on active medication list. I have reviewed chart for other potential  high risk medication/s and harmful drug interactions in the elderly.      Aspirin is not on active medication list.  Aspirin use is not indicated based on review of current medical condition/s. Risk of harm outweighs potential benefits.  .    Patient Active Problem List   Diagnosis    Acute bronchitis    B12 deficiency*    Chronic pain syndrome*    Hypertension*    Hyperlipidemia*    Diabetes mellitus*    Hypothyroidism*    Vitamin D deficiency*    Right ear pain    Sebaceous cyst    Chronic bronchitis    Post-menopausal osteoporosis  "   Tobacco use    Environmental allergies    Edema leg    Closed fracture of multiple ribs of left side    Chest wall pain    Encounter for screening for malignant neoplasm of colon    Left foot pain    Hearing loss of right ear    Diastolic dysfunction, left ventricle     Advance Care Planning Advance Directive is not on file.  ACP discussion was held with the patient during this visit. Patient does not have an advance directive, information provided.            Objective   Vitals:    25 1004   BP: 122/70   BP Location: Right arm   Patient Position: Sitting   Cuff Size: Adult   Pulse: 80   Temp: 96.4 °F (35.8 °C)   TempSrc: Temporal   SpO2: 95%   Weight: 71.9 kg (158 lb 9.6 oz)   Height: 160 cm (63\")   PainSc: 0-No pain       Estimated body mass index is 28.09 kg/m² as calculated from the following:    Height as of this encounter: 160 cm (63\").    Weight as of this encounter: 71.9 kg (158 lb 9.6 oz).                Does the patient have evidence of cognitive impairment? No  Lab Results   Component Value Date    HGBA1C 6.10 (H) 2025         Balance and gait unchanged.                                                                                      Health  Risk Assessment    Smoking Status:  Social History     Tobacco Use   Smoking Status Every Day    Current packs/day: 0.50    Average packs/day: 0.5 packs/day for 45.5 years (22.8 ttl pk-yrs)    Types: Cigarettes    Start date:     Passive exposure: Past   Smokeless Tobacco Never     Alcohol Consumption:  Social History     Substance and Sexual Activity   Alcohol Use No       Fall Risk Screen  STEADI Fall Risk Assessment was completed, and patient is at LOW risk for falls.Assessment completed on:2025    Depression Screening   Little interest or pleasure in doing things? Not at all   Feeling down, depressed, or hopeless? Not at all   PHQ-2 Total Score 0      Health Habits and Functional and Cognitive Screenin/25/2025    10:08 AM "   Functional & Cognitive Status   Do you have difficulty preparing food and eating? No   Do you have difficulty bathing yourself, getting dressed or grooming yourself? No   Do you have difficulty using the toilet? No   Do you have difficulty moving around from place to place? No   Do you have trouble with steps or getting out of a bed or a chair? No   Current Diet Well Balanced Diet   Dental Exam Other   Eye Exam Up to date   Exercise (times per week) Other   Current Exercises Include No Regular Exercise   Do you need help using the phone?  No   Are you deaf or do you have serious difficulty hearing?  No   Do you need help to go to places out of walking distance? Yes   Do you need help shopping? No   Do you need help preparing meals?  No   Do you need help with housework?  No   Do you need help with laundry? No   Do you need help taking your medications? No   Do you need help managing money? No   Do you ever drive or ride in a car without wearing a seat belt? No   Have you felt unusual fatigue (could be tiredness), stress, anger or loneliness in the last month? No   Who do you live with? Spouse   If you need help, do you have trouble finding someone available to you? No   Have you been bothered in the last four weeks by sexual problems? No   Do you have difficulty concentrating, remembering or making decisions? No           Age-appropriate Screening Schedule:  Refer to the list below for future screening recommendations based on patient's age, sex and/or medical conditions. Orders for these recommended tests are listed in the plan section. The patient has been provided with a written plan.    Health Maintenance List  Health Maintenance   Topic Date Due    DIABETIC FOOT EXAM  04/04/2024    DIABETIC EYE EXAM  08/14/2024    DXA SCAN  04/20/2025    COVID-19 Vaccine (1 - 2024-25 season) 09/20/2025 (Originally 9/1/2024)    INFLUENZA VACCINE  10/01/2025    LIPID PANEL  12/04/2025    HEMOGLOBIN A1C  12/25/2025    URINE  MICROALBUMIN-CREATININE RATIO (uACR)  03/24/2026    LUNG CANCER SCREENING  04/10/2026    ANNUAL WELLNESS VISIT  06/25/2026    MAMMOGRAM  11/11/2026    COLORECTAL CANCER SCREENING  06/07/2027    Pneumococcal Vaccine 50+  Completed    HEPATITIS C SCREENING  Discontinued    PAP SMEAR  Discontinued    TDAP/TD VACCINES  Discontinued    ZOSTER VACCINE  Discontinued                                                                                                                                                    CMS Preventative Services Quick Reference  Risk Factors Identified During Encounter  Immunizations Discussed/Encouraged: Shingrix    The above risks/problems have been discussed with the patient.  Pertinent information has been shared with the patient in the After Visit Summary.  An After Visit Summary and PPPS were made available to the patient.    Follow Up:   Next Medicare Wellness visit to be scheduled in 1 year.         Additional E&M Note during same encounter follows:  Patient has additional, significant, and separately identifiable condition(s)/problem(s) that require work above and beyond the Medicare Wellness Visit     Chief Complaint  Medicare Wellness-subsequent    Subjective   HPI  Ada is also being seen today for additional medical problem/s.        History of Present Illness      She reports experiencing mild discomfort in the lower region of her back and a burning sensation during urination, which she believes may be due to a urinary tract infection (UTI). She has no history of kidney stones and mentions that her symptoms are similar to previous UTIs. Her blood glucose levels have been well-managed, although she did not monitor them this morning. She expresses a preference for immediate antibiotic treatment rather than waiting for culture results.    Her respiratory function remains stable, but she experiences difficulty breathing in hot weather, necessitating indoor confinement. Cold temperatures  "exacerbate her joint pain, making it challenging to find a comfortable environment. She is currently on Breo for her COPD.    She is currently on a regimen of Synthroid 88 mcg for her thyroid condition.    Objective   Vital Signs:  /70 (BP Location: Right arm, Patient Position: Sitting, Cuff Size: Adult)   Pulse 80   Temp 96.4 °F (35.8 °C) (Temporal)   Ht 160 cm (63\")   Wt 71.9 kg (158 lb 9.6 oz)   SpO2 95%   BMI 28.09 kg/m²   Physical Exam    Physical Exam  Respiratory: Wheezing noted on auscultation  Gen: Patient in NAD. Pleasant and answers appropriately. A&Ox3.    Skin: Warm and dry with normal turgor. No purpura, rashes, or unusual pigmentation noted. Hair is normal in appearance and distribution.    HEENT: NC/AT. No lesions noted. Conjunctiva clear, sclera nonicteric. PERRL. EOMI without nystagmus or strabismus. Fundi appear benign. No hemorrhages or exudates of eyes. Auditory canals are patent bilaterally without lesions. TMs intact,  nonerythematous, bulging without lesions. Nasal mucosa pink, nonerythematous, and nonedematous. Frontal and maxillary sinuses are nontender. O/P erythematous and moist without exudate.    Neck: Supple without lymph nodes palpated. FROM. No carotid bruits appreciated bilaterally.    Lungs: Coarse and decreased B/L without rales, rhonchi, crackles, but with bilateral expiratory wheezes.    Heart: RRR. S1 and S2 normal. No S3 or S4. No MRGT.    Abd: Soft, nontender,nondistended. (+)BSx4 quadrants.  No flank pain appreciated bilaterally.    Extrem: No CC.  Trace edema bilateral lower extremities.  Radial pulses 2+/4 and equal B/L. FROMx4.  Joint tenderness noted.    Neuro: No focal motor/sensory deficits.          Results         Assessment and Plan     Diagnoses and all orders for this visit:    1. Encounter for subsequent annual wellness visit in Medicare patient (Primary)    2. Dysuria  -     POCT urinalysis dipstick, automated  -     Urine Culture - Urine, Urine, " Clean Catch; Future    3. Essential hypertension  -     chlorthalidone (HYGROTON) 25 MG tablet; Take 0.5 tablets by mouth Daily.  Dispense: 45 tablet; Refill: 1  -     losartan (COZAAR) 50 MG tablet; Take 2 tablets by mouth Daily. for blood pressure  Dispense: 180 tablet; Refill: 1  -     metoprolol tartrate (LOPRESSOR) 25 MG tablet; Take 1 tablet by mouth 2 (Two) Times a Day.  Dispense: 180 tablet; Refill: 1  -     Comprehensive Metabolic Panel; Future    4. Mixed hyperlipidemia  -     atorvastatin (LIPITOR) 10 MG tablet; Take 1 tablet by mouth Daily.  Dispense: 90 tablet; Refill: 1  -     Comprehensive Metabolic Panel; Future    5. Hypothyroidism, unspecified type  -     levothyroxine (Synthroid) 88 MCG tablet; Take 1 tablet by mouth Daily.  Dispense: 90 tablet; Refill: 1  -     Comprehensive Metabolic Panel; Future  -     T4, Free; Future  -     TSH; Future    6. Type 2 diabetes mellitus without complication, without long-term current use of insulin  -     linagliptin (TRADJENTA) 5 MG tablet tablet; Take 1 tablet by mouth Daily.  Dispense: 90 tablet; Refill: 1  -     metFORMIN (GLUCOPHAGE) 1000 MG tablet; Take 1 tablet by mouth 2 (Two) Times a Day With Meals.  Dispense: 180 tablet; Refill: 1  -     Comprehensive Metabolic Panel; Future  -     Hemoglobin A1c; Future    7. Seasonal allergic rhinitis, unspecified trigger  -     montelukast (SINGULAIR) 10 MG tablet; Take 1 tablet by mouth every night at bedtime.  Dispense: 90 tablet; Refill: 1  -     Comprehensive Metabolic Panel; Future    Other orders  -     sulfamethoxazole-trimethoprim (Bactrim DS) 800-160 MG per tablet; Take 1 tablet by mouth 2 (Two) Times a Day.  Dispense: 6 tablet; Refill: 0           Dysuria    Orders:    POCT urinalysis dipstick, automated    Urine Culture - Urine, Urine, Clean Catch; Future    Encounter for subsequent annual wellness visit in Medicare patient         Essential hypertension      Orders:    chlorthalidone (HYGROTON) 25 MG  tablet; Take 0.5 tablets by mouth Daily.    losartan (COZAAR) 50 MG tablet; Take 2 tablets by mouth Daily. for blood pressure    metoprolol tartrate (LOPRESSOR) 25 MG tablet; Take 1 tablet by mouth 2 (Two) Times a Day.    Comprehensive Metabolic Panel; Future    Mixed hyperlipidemia       Orders:    atorvastatin (LIPITOR) 10 MG tablet; Take 1 tablet by mouth Daily.    Comprehensive Metabolic Panel; Future    Hypothyroidism, unspecified type    Orders:    levothyroxine (Synthroid) 88 MCG tablet; Take 1 tablet by mouth Daily.    Comprehensive Metabolic Panel; Future    T4, Free; Future    TSH; Future    Type 2 diabetes mellitus without complication, without long-term current use of insulin      Orders:    linagliptin (TRADJENTA) 5 MG tablet tablet; Take 1 tablet by mouth Daily.    metFORMIN (GLUCOPHAGE) 1000 MG tablet; Take 1 tablet by mouth 2 (Two) Times a Day With Meals.    Comprehensive Metabolic Panel; Future    Hemoglobin A1c; Future    Seasonal allergic rhinitis, unspecified trigger    Orders:    montelukast (SINGULAIR) 10 MG tablet; Take 1 tablet by mouth every night at bedtime.    Comprehensive Metabolic Panel; Future         Assessment & Plan  1. Urinary Tract Infection (UTI).  - Symptoms suggest a UTI, including lower back pain and burning during urination.  - Bactrim will be prescribed as an initial treatment.  - If the culture results indicate a different antibiotic is needed, she will be contacted before Friday to adjust the medication.    2. Chronic Obstructive Pulmonary Disease (COPD).  - Breathing difficulties are exacerbated by heat.  - A sample of Breztri will be provided to try as an alternative to Breo.  - An emergency plan will be discussed to manage potential exacerbations due to heat exposure.    3. Hypothyroidism.  - She is currently taking Synthroid 88 mcg.  - This dosage has been confirmed and will be continued.           Patient or patient representative verbalized consent for the use of  Ambient Listening during the visit with  Cassie Rapp MD for chart documentation. 7/9/2025  23:37 EDT        Follow Up   Return in about 3 months (around 9/25/2025), or LABS.  Patient was given instructions and counseling regarding her condition or for health maintenance advice. Please see specific information pulled into the AVS if appropriate.    Cassie Rapp MD

## (undated) DEVICE — TUBING, SUCTION, 1/4" X 20', STRAIGHT: Brand: MEDLINE INDUSTRIES, INC.

## (undated) DEVICE — CONN Y IRR DISP 1P/U

## (undated) DEVICE — SYR LUERLOK 30CC

## (undated) DEVICE — Device: Brand: DEFENDO AIR/WATER/SUCTION AND BIOPSY VALVE

## (undated) DEVICE — Device

## (undated) DEVICE — SINGLE PORT MANIFOLD: Brand: NEPTUNE 2

## (undated) DEVICE — ENDOGATOR AUXILIARY WATER JET CONNECTOR: Brand: ENDOGATOR